# Patient Record
Sex: FEMALE | Race: WHITE | Employment: OTHER | URBAN - METROPOLITAN AREA
[De-identification: names, ages, dates, MRNs, and addresses within clinical notes are randomized per-mention and may not be internally consistent; named-entity substitution may affect disease eponyms.]

---

## 2017-01-04 ENCOUNTER — TELEPHONE (OUTPATIENT)
Dept: NEUROLOGY | Facility: CLINIC | Age: 82
End: 2017-01-04
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
## 2017-01-05 NOTE — TELEPHONE ENCOUNTER
Spoke with [employer] @ [facility], insurance issue was resolved. No further information is needed.

## 2017-01-12 ENCOUNTER — APPOINTMENT (OUTPATIENT)
Dept: PHYSICAL THERAPY | Facility: HOSPITAL | Age: 82
End: 2017-01-12
Attending: PHYSICAL MEDICINE & REHABILITATION
Payer: MEDICARE

## 2017-01-13 ENCOUNTER — TELEPHONE (OUTPATIENT)
Dept: NEUROLOGY | Facility: CLINIC | Age: 82
End: 2017-01-13

## 2017-01-13 NOTE — TELEPHONE ENCOUNTER
Spoke to patient who wanted to notify Dr. Shauna Dixon that she is very pleased with [facility]. Patient denies complaints at this time and feels she is making wonderful progress. Dr. Shauna Dixon notified. No further action required at this time.

## 2017-01-19 ENCOUNTER — APPOINTMENT (OUTPATIENT)
Dept: PHYSICAL THERAPY | Facility: HOSPITAL | Age: 82
End: 2017-01-19
Attending: PHYSICAL MEDICINE & REHABILITATION
Payer: MEDICARE

## 2017-02-08 ENCOUNTER — MED REC SCAN ONLY (OUTPATIENT)
Dept: NEUROLOGY | Facility: CLINIC | Age: 82
End: 2017-02-08

## 2017-02-16 ENCOUNTER — OFFICE VISIT (OUTPATIENT)
Dept: NEUROLOGY | Facility: CLINIC | Age: 82
End: 2017-02-16

## 2017-02-16 VITALS
DIASTOLIC BLOOD PRESSURE: 78 MMHG | RESPIRATION RATE: 16 BRPM | HEART RATE: 83 BPM | SYSTOLIC BLOOD PRESSURE: 128 MMHG | BODY MASS INDEX: 18 KG/M2 | WEIGHT: 104 LBS

## 2017-02-16 DIAGNOSIS — M43.17 SPONDYLOLISTHESIS AT L5-S1 LEVEL: ICD-10-CM

## 2017-02-16 DIAGNOSIS — M51.26 HNP (HERNIATED NUCLEUS PULPOSUS), LUMBAR: ICD-10-CM

## 2017-02-16 DIAGNOSIS — M96.1 POSTLAMINECTOMY SYNDROME, LUMBAR REGION: ICD-10-CM

## 2017-02-16 DIAGNOSIS — M43.16 SPONDYLOLISTHESIS AT L4-L5 LEVEL: ICD-10-CM

## 2017-02-16 DIAGNOSIS — M43.16 SPONDYLOLISTHESIS AT L3-L4 LEVEL: ICD-10-CM

## 2017-02-16 DIAGNOSIS — M48.061 SPINAL STENOSIS, LUMBAR REGION, WITHOUT NEUROGENIC CLAUDICATION: ICD-10-CM

## 2017-02-16 DIAGNOSIS — M51.37 DEGENERATION OF LUMBAR OR LUMBOSACRAL INTERVERTEBRAL DISC: Primary | ICD-10-CM

## 2017-02-16 PROCEDURE — 99214 OFFICE O/P EST MOD 30 MIN: CPT | Performed by: PHYSICAL MEDICINE & REHABILITATION

## 2017-02-16 NOTE — PATIENT INSTRUCTIONS
As of October 6th 2014, the Drug Enforcement Agency [facility]) is reclassifying all hydrocodone combination medications from Schedule III to Schedule II. This includes medications such as Norco, Vicodin, Lortab, Zohydro, and Vicoprofen.     What this means for y

## 2017-02-16 NOTE — PROGRESS NOTES
Low Back Pain H & P    Chief Complaint: Patient presents with:  Pain: pt here for follow up with c/o bilateral waist pain. patient is unable to bend/stand up straight/lay down due to pain.  patient is currently in Physical Therapy but to soon to determine r Location: [facility]    PATIENT DOCUMENTED NOT TO HAVE EXPERIENCED ANY OF THE FOLLOWING EVENTS N/A 1/6/2015    Comment Procedure: LUMBAR EPIDURAL;  Surgeon: Jose Espinoza MD;  Location: [address] [place] [address] W/ Problem Relation Age of Onset   • Heart Disorder Father      MI   • Diabetes Father    • Diabetes Maternal Grandmother        Social History     Social History   Marital Status:    Spouse Name: N/A    Years of Education: N/A  Number of Children: 1 Non-tender for bilateral Greater Trochanteric Bursa     Vascular lower extremity:   Dorsalis pedis pulse-RIGHT 2+   Dorsalis pedis pulse-LEFT 2+   Tibialis posterior pulse-RIGHT 2+   Tibialis posterior pulse-LEFT 2+     Neurological Lower Extremity:    Lig

## 2017-03-21 ENCOUNTER — MED REC SCAN ONLY (OUTPATIENT)
Dept: NEUROLOGY | Facility: CLINIC | Age: 82
End: 2017-03-21

## 2017-05-18 ENCOUNTER — OFFICE VISIT (OUTPATIENT)
Dept: NEUROLOGY | Facility: CLINIC | Age: 82
End: 2017-05-18

## 2017-05-18 VITALS
RESPIRATION RATE: 13 BRPM | SYSTOLIC BLOOD PRESSURE: 130 MMHG | HEIGHT: 64 IN | WEIGHT: 104 LBS | DIASTOLIC BLOOD PRESSURE: 62 MMHG | BODY MASS INDEX: 17.75 KG/M2 | HEART RATE: 84 BPM | OXYGEN SATURATION: 97 %

## 2017-05-18 DIAGNOSIS — M43.16 SPONDYLOLISTHESIS AT L4-L5 LEVEL: ICD-10-CM

## 2017-05-18 DIAGNOSIS — M43.16 SPONDYLOLISTHESIS AT L3-L4 LEVEL: ICD-10-CM

## 2017-05-18 DIAGNOSIS — M48.061 LUMBAR FORAMINAL STENOSIS: ICD-10-CM

## 2017-05-18 DIAGNOSIS — M48.061 SPINAL STENOSIS, LUMBAR REGION, WITHOUT NEUROGENIC CLAUDICATION: ICD-10-CM

## 2017-05-18 DIAGNOSIS — M54.16 LUMBAR RADICULOPATHY: ICD-10-CM

## 2017-05-18 DIAGNOSIS — M51.37 DEGENERATION OF LUMBAR OR LUMBOSACRAL INTERVERTEBRAL DISC: Primary | ICD-10-CM

## 2017-05-18 DIAGNOSIS — M51.26 HNP (HERNIATED NUCLEUS PULPOSUS), LUMBAR: ICD-10-CM

## 2017-05-18 DIAGNOSIS — M96.1 POSTLAMINECTOMY SYNDROME, LUMBAR REGION: ICD-10-CM

## 2017-05-18 DIAGNOSIS — M43.17 SPONDYLOLISTHESIS AT L5-S1 LEVEL: ICD-10-CM

## 2017-05-18 PROCEDURE — 99214 OFFICE O/P EST MOD 30 MIN: CPT | Performed by: PHYSICAL MEDICINE & REHABILITATION

## 2017-05-18 RX ORDER — ACETAMINOPHEN 500 MG
500 TABLET ORAL 2 TIMES DAILY PRN
COMMUNITY

## 2017-05-18 NOTE — PATIENT INSTRUCTIONS
As of October 6th 2014, the Drug Enforcement Agency [facility]) is reclassifying all hydrocodone combination medications from Schedule III to Schedule II. This includes medications such as Norco, Vicodin, Lortab, Zohydro, and Vicoprofen.     What this means for y chart.      Plan  The patient will continue with her home exercise program.    She will continue with the Tylenol for the pain as needed. The patient does not need any injections at this time. She is not interested in seeing a spine surgeon.     She w

## 2017-05-18 NOTE — PROGRESS NOTES
Low Back Pain H & P    Chief Complaint: Patient presents with:  Pain: pt here for follow up with continued bilateral waist/lower back pain radiating upward when bad that is worse on the right side. patient is unable to lay down due to the pain.  completed p THE FOLLOWING EVENTS N/A 1/6/2015    Comment Procedure: LUMBAR EPIDURAL;  Surgeon: Manjula Chinchilla MD;  Location: [facility]    INJECTION, W/WO CONTRAST, DX/THERAPEUTIC SUBSTANCE, EPIDURAL/SUBARACHNOID; LUMBAR/SACRAL N/A 1/19/2015    Co Diabetes Maternal Grandmother        Social History     Social History   Marital Status:    Spouse Name: N/A    Years of Education: N/A  Number of Children: 1     Occupational History       dance teacher-semi retired       Social History RIGHT:   4-/5  Hamstring LEFT:   4-/5   RIGHT plantar reflexes: downward response   LEFT plantar reflexes: downward response   Reflexes: 2+ in bilateral lower extremities     Assessment  1.  L5-S1 mod diffuse & right large far lateral, L4-5 mod diffuse, L3-

## 2017-11-16 ENCOUNTER — OFFICE VISIT (OUTPATIENT)
Dept: NEUROLOGY | Facility: CLINIC | Age: 82
End: 2017-11-16

## 2017-11-16 VITALS
BODY MASS INDEX: 18.78 KG/M2 | DIASTOLIC BLOOD PRESSURE: 66 MMHG | HEART RATE: 72 BPM | RESPIRATION RATE: 16 BRPM | HEIGHT: 64 IN | WEIGHT: 110 LBS | SYSTOLIC BLOOD PRESSURE: 140 MMHG

## 2017-11-16 DIAGNOSIS — M51.37 DEGENERATION OF LUMBAR OR LUMBOSACRAL INTERVERTEBRAL DISC: Primary | ICD-10-CM

## 2017-11-16 DIAGNOSIS — M51.26 HNP (HERNIATED NUCLEUS PULPOSUS), LUMBAR: ICD-10-CM

## 2017-11-16 DIAGNOSIS — M96.1 POSTLAMINECTOMY SYNDROME, LUMBAR REGION: ICD-10-CM

## 2017-11-16 DIAGNOSIS — M48.061 SPINAL STENOSIS, LUMBAR REGION, WITHOUT NEUROGENIC CLAUDICATION: ICD-10-CM

## 2017-11-16 DIAGNOSIS — S80.812A ABRASION OF LEFT LOWER LEG WITH INFECTION, INITIAL ENCOUNTER: ICD-10-CM

## 2017-11-16 DIAGNOSIS — M43.17 SPONDYLOLISTHESIS AT L5-S1 LEVEL: ICD-10-CM

## 2017-11-16 DIAGNOSIS — L08.9 ABRASION OF LEFT LOWER LEG WITH INFECTION, INITIAL ENCOUNTER: ICD-10-CM

## 2017-11-16 DIAGNOSIS — M43.16 SPONDYLOLISTHESIS AT L3-L4 LEVEL: ICD-10-CM

## 2017-11-16 DIAGNOSIS — M54.16 LUMBAR RADICULOPATHY: ICD-10-CM

## 2017-11-16 DIAGNOSIS — M48.061 LUMBAR FORAMINAL STENOSIS: ICD-10-CM

## 2017-11-16 DIAGNOSIS — M43.16 SPONDYLOLISTHESIS AT L4-L5 LEVEL: ICD-10-CM

## 2017-11-16 PROCEDURE — 99214 OFFICE O/P EST MOD 30 MIN: CPT | Performed by: PHYSICAL MEDICINE & REHABILITATION

## 2017-11-16 NOTE — PROGRESS NOTES
Low Back Pain H & P    Chief Complaint: Patient presents with:  Back Pain: LOV:5/18/17 Patient is following up on back pain not getting better. Patient rated her pain a 5/10      Patient was last seen on 5/18/17.   She is going to dance classes 2 times a we Dhaval Pratt MD;  Location: [address] [place] [address] [address]                MANAGEMENT  1/28/2015: INJECTION, W/WO CONTRAST, DX/THERAPEUTIC SUBST* N/A      Comment: Procedure: LUMBAR EPIDURAL;  Surgeon: Zigmund Bloch, MD;  Location: [address] [place] [address] History Main Topics   Smoking status: Never Smoker    Smokeless tobacco: Never Used    Alcohol use No    Drug use: No    Sexual activity: No    Comment:      Other Topics Concern     Service No    Blood Transfusions No    Caffeine Concern Natalya Murray lateral, L4-5 mod diffuse, L3-4 right mod, L2-3 mod diffuse bugling discs    2. L4-5 mod central, L3-4 mod-severe central, L2-3 mod central stenosis    3. L4-5 right large HNP    4. Spondylolisthesis at L4-L5 level grade 1-2 unstable    5.  Spondylolisthesi

## 2017-11-16 NOTE — PROGRESS NOTES
Pt presented with left leg abrasion above ankle, skin tear 2.5 x 3.0 cm. Near ankle. Pt leg is swollen and redden around abrasion to ankle to foot. [facility] called arrangement made for pt to see PCP associate today at 145pm. Report to Violetta Leonard triage.  Wound was

## 2017-11-22 PROCEDURE — 87070 CULTURE OTHR SPECIMN AEROBIC: CPT | Performed by: NURSE PRACTITIONER

## 2017-11-22 PROCEDURE — 87205 SMEAR GRAM STAIN: CPT | Performed by: NURSE PRACTITIONER

## 2017-11-29 PROBLEM — S81.802A TRAUMATIC OPEN WOUND OF LEFT LOWER LEG, INITIAL ENCOUNTER: Status: ACTIVE | Noted: 2017-11-29

## 2017-12-01 PROBLEM — S81.802D TRAUMATIC OPEN WOUND OF LEFT LOWER LEG, SUBSEQUENT ENCOUNTER: Status: ACTIVE | Noted: 2017-11-29

## 2018-01-19 PROBLEM — K41.90 FEMORAL HERNIA OF LEFT SIDE: Status: ACTIVE | Noted: 2018-01-19

## 2018-03-01 PROBLEM — H35.363 DRUSEN (DEGENERATIVE) OF MACULA, BILATERAL: Status: ACTIVE | Noted: 2018-03-01

## 2018-03-01 PROBLEM — F40.240 CLAUSTROPHOBIA: Status: ACTIVE | Noted: 2018-03-01

## 2018-06-12 PROBLEM — I35.0 AORTIC STENOSIS, MODERATE: Status: ACTIVE | Noted: 2018-06-12

## 2018-07-18 PROBLEM — M54.50 LOW BACK PAIN AT MULTIPLE SITES: Status: ACTIVE | Noted: 2018-07-18

## 2018-08-23 PROBLEM — S80.812A ABRASION OF LEFT LOWER LEG WITH INFECTION: Status: RESOLVED | Noted: 2017-11-16 | Resolved: 2018-08-23

## 2018-08-23 PROBLEM — S81.802D TRAUMATIC OPEN WOUND OF LEFT LOWER LEG, SUBSEQUENT ENCOUNTER: Status: RESOLVED | Noted: 2017-11-29 | Resolved: 2018-08-23

## 2018-08-23 PROBLEM — L08.9 ABRASION OF LEFT LOWER LEG WITH INFECTION: Status: RESOLVED | Noted: 2017-11-16 | Resolved: 2018-08-23

## 2018-12-05 ENCOUNTER — HOSPITAL ENCOUNTER (EMERGENCY)
Facility: HOSPITAL | Age: 83
Discharge: HOME | End: 2018-12-05
Payer: MEDICARE

## 2018-12-05 VITALS
SYSTOLIC BLOOD PRESSURE: 130 MMHG | DIASTOLIC BLOOD PRESSURE: 63 MMHG | BODY MASS INDEX: 18.78 KG/M2 | HEART RATE: 88 BPM | WEIGHT: 110 LBS | TEMPERATURE: 97 F | RESPIRATION RATE: 20 BRPM | OXYGEN SATURATION: 98 % | HEIGHT: 64 IN

## 2018-12-05 NOTE — ED INITIAL ASSESSMENT (HPI)
Pt is c/o back pain that started this morning, pt was seen at Dr. Joy Manzo this morning and she was given toradol 15mg IM, and depo-medrol 80 mg IM for pain. A few hrs later her pain came back and not she is here.  Pt took tylenol and a muscle relaxer at 1300

## 2019-07-05 ENCOUNTER — HOSPITAL ENCOUNTER (OUTPATIENT)
Age: 84
Discharge: HOME | End: 2019-07-05
Attending: EMERGENCY MEDICINE
Payer: MEDICARE

## 2019-07-05 VITALS
DIASTOLIC BLOOD PRESSURE: 64 MMHG | HEART RATE: 75 BPM | WEIGHT: 110 LBS | BODY MASS INDEX: 18.78 KG/M2 | SYSTOLIC BLOOD PRESSURE: 151 MMHG | HEIGHT: 64 IN | TEMPERATURE: 97 F | OXYGEN SATURATION: 100 % | RESPIRATION RATE: 24 BRPM

## 2019-07-05 DIAGNOSIS — S81.812A NONINFECTED SKIN TEAR OF LEFT LEG, INITIAL ENCOUNTER: Primary | ICD-10-CM

## 2019-07-05 PROCEDURE — 99202 OFFICE O/P NEW SF 15 MIN: CPT

## 2019-07-05 PROCEDURE — 99212 OFFICE O/P EST SF 10 MIN: CPT

## 2019-07-05 NOTE — ED PROVIDER NOTES
Patient Seen in: [facility]    History   Patient presents with:  Laceration Abrasion (integumentary)    Stated Complaint: TL-Laceration    HPI    Patient presents to the urgent care today with complaint of laceration note total) by mouth daily. acetaminophen 500 MG Oral Tab,  Take 500 mg by mouth 2 (two) times daily as needed for Pain. Multiple Vitamin (ONE-DAILY MULTI VITAMINS) Oral Tab,  Take 1 tablet by mouth daily.    tiZANidine HCl 4 MG Oral Cap,  Take 1 capsule (4 care at home as well as reasons to return she is comfortable with plan.     ED Course   Labs Reviewed - No data to display     MDM     100% Normal  Pulse oximetry       Disposition and Plan     We recommend that you schedule follow up care with a primary ca
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
## 2019-08-29 ENCOUNTER — APPOINTMENT (OUTPATIENT)
Dept: ULTRASOUND IMAGING | Facility: HOSPITAL | Age: 84
End: 2019-08-29
Attending: EMERGENCY MEDICINE
Payer: MEDICARE

## 2019-08-29 ENCOUNTER — HOSPITAL ENCOUNTER (OUTPATIENT)
Age: 84
Discharge: HOME | End: 2019-08-29
Attending: EMERGENCY MEDICINE
Payer: MEDICARE

## 2019-08-29 VITALS
HEIGHT: 64 IN | RESPIRATION RATE: 20 BRPM | WEIGHT: 110 LBS | OXYGEN SATURATION: 99 % | BODY MASS INDEX: 18.78 KG/M2 | HEART RATE: 80 BPM | DIASTOLIC BLOOD PRESSURE: 72 MMHG | SYSTOLIC BLOOD PRESSURE: 165 MMHG | TEMPERATURE: 98 F

## 2019-08-29 DIAGNOSIS — L03.119 CELLULITIS OF LOWER EXTREMITY, UNSPECIFIED LATERALITY: Primary | ICD-10-CM

## 2019-08-29 LAB
#MXD IC: 0.6 X10ˆ3/UL (ref 0.1–1)
CREAT BLD-MCNC: 0.5 MG/DL (ref 0.55–1.02)
GLUCOSE BLD-MCNC: 98 MG/DL (ref 70–99)
HCT VFR BLD AUTO: 44.3 % (ref 35–48)
HGB BLD-MCNC: 13.9 G/DL (ref 12–16)
ISTAT BUN: 21 MG/DL (ref 8–20)
ISTAT CHLORIDE: 100 MMOL/L (ref 101–111)
ISTAT HEMATOCRIT: 42 % (ref 34–50)
ISTAT IONIZED CALCIUM FOR CHEM 8: 1.25 MMOL/L (ref 1.12–1.32)
ISTAT POTASSIUM: 3.9 MMOL/L (ref 3.6–5.1)
ISTAT SODIUM: 137 MMOL/L (ref 136–145)
ISTAT TCO2: 28 MMOL/L (ref 22–32)
LYMPHOCYTES # BLD AUTO: 1.4 X10ˆ3/UL (ref 1–4)
LYMPHOCYTES NFR BLD AUTO: 24.3 %
MCH RBC QN AUTO: 28.6 PG (ref 26–34)
MCHC RBC AUTO-ENTMCNC: 31.4 G/DL (ref 31–37)
MCV RBC AUTO: 91.2 FL (ref 80–100)
MIXED CELL %: 10.8 %
NEUTROPHILS # BLD AUTO: 3.9 X10ˆ3/UL (ref 1.5–7.7)
NEUTROPHILS NFR BLD AUTO: 64.9 %
PLATELET # BLD AUTO: 212 X10ˆ3/UL (ref 150–450)
RBC # BLD AUTO: 4.86 X10ˆ6/UL (ref 3.8–5.3)
WBC # BLD AUTO: 5.9 X10ˆ3/UL (ref 4–11)

## 2019-08-29 PROCEDURE — 99214 OFFICE O/P EST MOD 30 MIN: CPT

## 2019-08-29 PROCEDURE — 85025 COMPLETE CBC W/AUTO DIFF WBC: CPT | Performed by: EMERGENCY MEDICINE

## 2019-08-29 PROCEDURE — 93971 EXTREMITY STUDY: CPT | Performed by: EMERGENCY MEDICINE

## 2019-08-29 PROCEDURE — 80047 BASIC METABLC PNL IONIZED CA: CPT

## 2019-08-29 RX ORDER — AZITHROMYCIN 250 MG/1
TABLET, FILM COATED ORAL
Qty: 1 PACKAGE | Refills: 0 | Status: SHIPPED | OUTPATIENT
Start: 2019-08-29 | End: 2019-09-03

## 2019-08-29 NOTE — ED PROVIDER NOTES
Patient Seen in: [facility]    History   Patient presents with:  Rash Skin Problem (integumentary)    Stated Complaint: r-leg red    HPI    This patient complains of redness and swelling to the right leg.   Patient stated systems reviewed and negative except as noted above.     Physical Exam     ED Triage Vitals [08/29/19 1139]   BP (!) 165/72   Pulse 80   Resp 20   Temp 97.5 °F (36.4 °C)   Temp src Oral   SpO2 99 %   O2 Device None (Room air)       Current:BP (!) 165/72   P on 8/29/2019 at 12:37     Approved by (CST): Tayla Adam MD on 8/29/2019 at 12:37          Labs Reviewed   POCT ISTAT CHEM8 CARTRIDGE - Abnormal; Notable for the following components:       Result Value    ISTAT BUN 21 (*)     ISTAT Chloride 100 (*)

## 2019-08-29 NOTE — ED INITIAL ASSESSMENT (HPI)
Patient with right shin swelling, redness and warmth x 1 week. States she struck her leg on the door of her  prior to the redness. Denies any drainage from the site.

## 2020-07-12 ENCOUNTER — HOSPITAL ENCOUNTER (OUTPATIENT)
Age: 85
Discharge: HOME | End: 2020-07-12
Attending: EMERGENCY MEDICINE
Payer: MEDICARE

## 2020-07-12 VITALS
DIASTOLIC BLOOD PRESSURE: 82 MMHG | SYSTOLIC BLOOD PRESSURE: 191 MMHG | RESPIRATION RATE: 20 BRPM | HEART RATE: 88 BPM | OXYGEN SATURATION: 99 % | TEMPERATURE: 98 F

## 2020-07-12 DIAGNOSIS — M62.830 BACK SPASM: Primary | ICD-10-CM

## 2020-07-12 PROCEDURE — 99214 OFFICE O/P EST MOD 30 MIN: CPT

## 2020-07-12 PROCEDURE — 96372 THER/PROPH/DIAG INJ SC/IM: CPT

## 2020-07-12 RX ORDER — KETOROLAC TROMETHAMINE 30 MG/ML
30 INJECTION, SOLUTION INTRAMUSCULAR; INTRAVENOUS ONCE
Status: COMPLETED | OUTPATIENT
Start: 2020-07-12 | End: 2020-07-12

## 2020-07-12 NOTE — ED PROVIDER NOTES
[facility] [address]      History   Patient presents with:  Back Pain    Stated Complaint: back spasms    HPI    22-year-old female with history of thoracic back spasms presents for evaluation of back pain.   Janee Current:BP (!) 191/82   Pulse 88   Temp 98.2 °F (36.8 °C) (Temporal)   Resp 20   SpO2 99%         Physical Exam  Vitals signs and nursing note reviewed. Constitutional:       General: She is not in acute distress. Appearance: Normal appearance. [id number]  [phone]    Schedule an appointment as soon as possible for a visit in 2 days  For follow up          Medications Prescribed:  Current Discharge Medication List

## 2020-09-15 ENCOUNTER — HOSPITAL ENCOUNTER (OUTPATIENT)
Age: 85
Discharge: HOME | End: 2020-09-15
Attending: EMERGENCY MEDICINE
Payer: MEDICARE

## 2020-09-15 VITALS
HEART RATE: 83 BPM | WEIGHT: 104 LBS | HEIGHT: 64 IN | RESPIRATION RATE: 20 BRPM | SYSTOLIC BLOOD PRESSURE: 164 MMHG | OXYGEN SATURATION: 99 % | DIASTOLIC BLOOD PRESSURE: 60 MMHG | BODY MASS INDEX: 17.75 KG/M2 | TEMPERATURE: 98 F

## 2020-09-15 DIAGNOSIS — M62.830 LUMBAR PARASPINAL MUSCLE SPASM: Primary | ICD-10-CM

## 2020-09-15 PROCEDURE — 96372 THER/PROPH/DIAG INJ SC/IM: CPT

## 2020-09-15 PROCEDURE — 99214 OFFICE O/P EST MOD 30 MIN: CPT

## 2020-09-15 RX ORDER — KETOROLAC TROMETHAMINE 30 MG/ML
15 INJECTION, SOLUTION INTRAMUSCULAR; INTRAVENOUS ONCE
Status: COMPLETED | OUTPATIENT
Start: 2020-09-15 | End: 2020-09-15

## 2020-09-15 NOTE — ED PROVIDER NOTES
Patient Seen in: [facility]      History   Patient presents with:  Back Pain    Stated Complaint: BACK SPASMS    HPI    80year old female with h/o hyperlipidemia, arthritis, multi-level spondylolisthesis who presents for O2 Device None (Room air)       Current:BP (!) 164/60   Pulse 83   Temp 98 °F (36.7 °C) (Temporal)   Resp 20   Ht 162.6 cm (5' 4\")   Wt 47.2 kg   SpO2 99%   BMI 17.85 kg/m²         Physical Exam  Vitals signs and nursing note reviewed.    Constitutional: paraspinal muscle spasm  (primary encounter diagnosis)    Disposition:  Discharge  9/15/2020  8:24 am    Follow-up:  Candy Patiño MD  [address] [place] [address] [phone]    Schedule an appointment as soon as possible for a

## 2020-12-05 ENCOUNTER — HOSPITAL ENCOUNTER (OUTPATIENT)
Age: 85
Discharge: HOME | End: 2020-12-05
Attending: EMERGENCY MEDICINE
Payer: MEDICARE

## 2020-12-05 VITALS
TEMPERATURE: 99 F | OXYGEN SATURATION: 100 % | DIASTOLIC BLOOD PRESSURE: 79 MMHG | HEART RATE: 91 BPM | SYSTOLIC BLOOD PRESSURE: 153 MMHG | RESPIRATION RATE: 18 BRPM

## 2020-12-05 DIAGNOSIS — S29.012A STRAIN OF THORACIC SPINE: Primary | ICD-10-CM

## 2020-12-05 PROCEDURE — 96372 THER/PROPH/DIAG INJ SC/IM: CPT

## 2020-12-05 PROCEDURE — 99214 OFFICE O/P EST MOD 30 MIN: CPT

## 2020-12-05 RX ORDER — KETOROLAC TROMETHAMINE 30 MG/ML
15 INJECTION, SOLUTION INTRAMUSCULAR; INTRAVENOUS ONCE
Status: COMPLETED | OUTPATIENT
Start: 2020-12-05 | End: 2020-12-05

## 2020-12-05 NOTE — ED PROVIDER NOTES
Patient Seen in: [facility]      History   Patient presents with:  Back Pain    Stated Complaint: back pain    HPI    The patient is an 44-year-old female with past history of claustrophobia, intermittent back pain who presents now with mild Smokeless tobacco: Never Used    Alcohol use: No      Alcohol/week: 0.0 standard drinks    Drug use: No             Review of Systems    Positive for stated complaint: back pain  Other systems are as noted in HPI. Constitutional and vital signs reviewed. [address]  [phone]      As needed          Medications Prescribed:  Discharge Medication List as of 12/5/2020 12:14 PM

## 2020-12-05 NOTE — ED INITIAL ASSESSMENT (HPI)
PATIENT ARRIVED AMBULATORY TO ROOM C/O MID BACK PAIN. PATIENT STATES SHE HAS A H/O BACK SPASMS.  PATIENT STATES \"I USUALLY GET A SHOT OF TORADOL AND THAT WORKS FOR ME\" NO NUMBNESS/TINGLING TO EXTREMITIES

## 2020-12-10 ENCOUNTER — HOSPITAL ENCOUNTER (OUTPATIENT)
Age: 85
Discharge: HOME | End: 2020-12-10
Attending: EMERGENCY MEDICINE
Payer: MEDICARE

## 2020-12-10 VITALS
SYSTOLIC BLOOD PRESSURE: 137 MMHG | OXYGEN SATURATION: 100 % | HEART RATE: 82 BPM | DIASTOLIC BLOOD PRESSURE: 73 MMHG | TEMPERATURE: 98 F | BODY MASS INDEX: 19 KG/M2 | WEIGHT: 110 LBS | RESPIRATION RATE: 18 BRPM

## 2020-12-10 DIAGNOSIS — S29.012A STRAIN OF THORACIC SPINE: Primary | ICD-10-CM

## 2020-12-10 PROCEDURE — 99214 OFFICE O/P EST MOD 30 MIN: CPT

## 2020-12-10 PROCEDURE — 96372 THER/PROPH/DIAG INJ SC/IM: CPT

## 2020-12-10 PROCEDURE — 81002 URINALYSIS NONAUTO W/O SCOPE: CPT

## 2020-12-10 RX ORDER — KETOROLAC TROMETHAMINE 30 MG/ML
15 INJECTION, SOLUTION INTRAMUSCULAR; INTRAVENOUS ONCE
Status: COMPLETED | OUTPATIENT
Start: 2020-12-10 | End: 2020-12-10

## 2020-12-10 NOTE — ED INITIAL ASSESSMENT (HPI)
Presents ambulatory to room. Multiple IC visits for chronic back pain requesting Toradol injections to control pain. Seen here 12/5/2020 for same symptoms. States pain became worse over last 2 days. Requesting more pain meds.

## 2020-12-10 NOTE — ED PROVIDER NOTES
Patient Seen in: [facility]      History   Patient presents with:  Back Pain    Stated Complaint: back pain    HPI    The patient is a 80-year-old female with a past history of osteoarthritis of the back, chronic intermittent back pain who pr noted above.     Physical Exam     ED Triage Vitals [12/10/20 1109]   /73   Pulse 82   Resp 18   Temp 97.5 °F (36.4 °C)   Temp src Temporal   SpO2 100 %   O2 Device None (Room air)       Current:/73   Pulse 82   Temp 97.5 °F (36.4 °C) (Temporal) am    Follow-up:  Owen Sanford MD  [address] [address]  [phone]      Call for an appointment          Medications Prescribed:  Discharge Medication List as of 12/10/2020 11:37 AM

## 2020-12-11 ENCOUNTER — HOSPITAL ENCOUNTER (OUTPATIENT)
Age: 85
Discharge: HOME | End: 2020-12-11
Attending: EMERGENCY MEDICINE
Payer: MEDICARE

## 2020-12-11 VITALS
DIASTOLIC BLOOD PRESSURE: 69 MMHG | TEMPERATURE: 99 F | HEIGHT: 64 IN | SYSTOLIC BLOOD PRESSURE: 165 MMHG | RESPIRATION RATE: 18 BRPM | WEIGHT: 110 LBS | HEART RATE: 83 BPM | OXYGEN SATURATION: 100 % | BODY MASS INDEX: 18.78 KG/M2

## 2020-12-11 DIAGNOSIS — S29.012A STRAIN OF THORACIC BACK REGION: Primary | ICD-10-CM

## 2020-12-11 PROCEDURE — 99214 OFFICE O/P EST MOD 30 MIN: CPT

## 2020-12-11 PROCEDURE — 96372 THER/PROPH/DIAG INJ SC/IM: CPT

## 2020-12-11 RX ORDER — KETOROLAC TROMETHAMINE 30 MG/ML
30 INJECTION, SOLUTION INTRAMUSCULAR; INTRAVENOUS ONCE
Status: COMPLETED | OUTPATIENT
Start: 2020-12-11 | End: 2020-12-11

## 2020-12-11 NOTE — ED INITIAL ASSESSMENT (HPI)
PATIENT C/O PAIN TO LEFT SCAPULA AREA. DENIES TRAUMA/INJURY. WAS SEEN IN THE IC ON 12/6 AND 12/10, GIVEN TORADOL INJECTIONS DURING THESE VISITS, STATES PAIN IMPROVES WITH THE TORADOL BUT THEN RETURNS. TAKING TYLENOL AND A MUSCLE RELAXER AT HOME.

## 2020-12-12 NOTE — ED PROVIDER NOTES
Patient Seen in: [facility]      History   Patient presents with:  Back Pain    Stated Complaint: Left back pain    HPI    The patient is a 80-year-old female who presents with left upper back pain just medial to her scapula for the last few Current:BP (!) 165/69   Pulse 83   Temp 99.4 °F (37.4 °C) (Temporal)   Resp 18   Ht 162.6 cm (5' 4\")   Wt 49.9 kg   SpO2 100%   BMI 18.88 kg/m²         Physical Exam  Vitals signs and nursing note reviewed.    Constitutional:       General: She is not in a Patient with reproducible pain that has responded to Toradol. Patient understands that she should not get any more Toradol injections after today and should follow-up with her physiatrist and make appoint for physical therapy.   Patient to return if sympto

## 2020-12-23 ENCOUNTER — HOSPITAL ENCOUNTER (OUTPATIENT)
Age: 85
Discharge: HOME | End: 2020-12-23
Attending: EMERGENCY MEDICINE
Payer: MEDICARE

## 2020-12-23 VITALS
TEMPERATURE: 99 F | OXYGEN SATURATION: 98 % | DIASTOLIC BLOOD PRESSURE: 98 MMHG | HEIGHT: 64 IN | BODY MASS INDEX: 18.78 KG/M2 | WEIGHT: 110 LBS | RESPIRATION RATE: 20 BRPM | SYSTOLIC BLOOD PRESSURE: 135 MMHG | HEART RATE: 94 BPM

## 2020-12-23 DIAGNOSIS — M54.6 ACUTE RIGHT-SIDED THORACIC BACK PAIN: Primary | ICD-10-CM

## 2020-12-23 PROCEDURE — 99214 OFFICE O/P EST MOD 30 MIN: CPT

## 2020-12-23 PROCEDURE — 96372 THER/PROPH/DIAG INJ SC/IM: CPT

## 2020-12-23 RX ORDER — KETOROLAC TROMETHAMINE 30 MG/ML
15 INJECTION, SOLUTION INTRAMUSCULAR; INTRAVENOUS ONCE
Status: COMPLETED | OUTPATIENT
Start: 2020-12-23 | End: 2020-12-23

## 2020-12-23 NOTE — ED INITIAL ASSESSMENT (HPI)
PATIENT REPORTS LOWER BACK PIAN. PAIN IS CHRONIC IN NAUTRE. RECENTLY STARTED PT.  NO NEW TRAUMA/INJURY.

## 2020-12-24 NOTE — ED PROVIDER NOTES
Patient Seen in: [facility]      History   Patient presents with:  Back Pain    Stated Complaint: lower back spasmx    HPI    79 yo female with chronic back pain presents with pain to the right upper thoracic back. No new trauma.  No recent il Conjunctiva/sclera: Conjunctivae normal.      Pupils: Pupils are equal, round, and reactive to light. Neck:      Musculoskeletal: Normal range of motion and neck supple. Cardiovascular:      Rate and Rhythm: Normal rate and regular rhythm.    Pulmonary:

## 2021-04-16 ENCOUNTER — HOSPITAL ENCOUNTER (OUTPATIENT)
Age: 86
Discharge: HOME | End: 2021-04-16
Attending: EMERGENCY MEDICINE
Payer: MEDICARE

## 2021-04-16 VITALS
DIASTOLIC BLOOD PRESSURE: 69 MMHG | SYSTOLIC BLOOD PRESSURE: 157 MMHG | RESPIRATION RATE: 24 BRPM | TEMPERATURE: 99 F | HEART RATE: 89 BPM | OXYGEN SATURATION: 97 %

## 2021-04-16 DIAGNOSIS — M54.9 BACK PAIN WITHOUT RADIATION: Primary | ICD-10-CM

## 2021-04-16 PROCEDURE — 99213 OFFICE O/P EST LOW 20 MIN: CPT

## 2021-04-16 PROCEDURE — 99214 OFFICE O/P EST MOD 30 MIN: CPT

## 2021-04-16 PROCEDURE — 96372 THER/PROPH/DIAG INJ SC/IM: CPT

## 2021-04-16 RX ORDER — KETOROLAC TROMETHAMINE 30 MG/ML
15 INJECTION, SOLUTION INTRAMUSCULAR; INTRAVENOUS ONCE
Status: COMPLETED | OUTPATIENT
Start: 2021-04-16 | End: 2021-04-16

## 2021-04-16 NOTE — ED INITIAL ASSESSMENT (HPI)
Patient c/o left sided back spasms starting yesterday.   Patient states similar pain has been relieved by toradol injections in the past.

## 2021-04-18 ENCOUNTER — HOSPITAL ENCOUNTER (OUTPATIENT)
Age: 86
Discharge: HOME | End: 2021-04-18
Attending: EMERGENCY MEDICINE
Payer: MEDICARE

## 2021-04-18 VITALS
DIASTOLIC BLOOD PRESSURE: 83 MMHG | RESPIRATION RATE: 24 BRPM | OXYGEN SATURATION: 97 % | TEMPERATURE: 98 F | HEART RATE: 110 BPM | SYSTOLIC BLOOD PRESSURE: 160 MMHG

## 2021-04-18 DIAGNOSIS — M54.9 SEVERE BACK PAIN: Primary | ICD-10-CM

## 2021-04-18 PROCEDURE — 99213 OFFICE O/P EST LOW 20 MIN: CPT

## 2021-04-18 PROCEDURE — 99212 OFFICE O/P EST SF 10 MIN: CPT

## 2021-04-18 NOTE — ED PROVIDER NOTES
Patient Seen in: [facility]      History   Patient presents with:  Back Pain    Stated Complaint: back pain    HPI/Subjective:   HPI    81 yo female with h/o chronic back pain which she describes as spasm.  Pain is usually right thoracolumbar Procedure: LUMBAR EPIDURAL;  Surgeon: Joel Belle MD;  Location: [address] [place] [address]   • PATIENT DOCUMENTED NOT TO HAVE EXPERIENCED ANY OF THE FOLLOWING EVENTS N/A 1/6/2015    Procedure: LUMBAR EPIDURAL;  Surgeon: Joel Belle MD;  Lesli Middleton (Room air)       Current:/83   Pulse 110   Temp 98.4 °F (36.9 °C) (Temporal)   Resp 24   SpO2 97%         Physical Exam  Vitals and nursing note reviewed. Constitutional:       General: She is in acute distress.       Appearance: She is well-develop [facility] [address] [place] [address]  [phone]    In 1 day            Medications Prescribed:  Current Discharge Medication List

## 2021-04-18 NOTE — ED PROVIDER NOTES
Patient Seen in: [facility]      History   Patient presents with:  Back Pain    Stated Complaint: back spasms    HPI/Subjective:   HPI    81 yo female with chronic back pain.  Has had epidural injection in the past. Pain is left sided and desc EVENTS N/A 1/6/2015    Procedure: LUMBAR EPIDURAL;  Surgeon: Mamie Bianchi MD;  Location: [address] [place] [address]   • PATIENT DOCUMENTED NOT TO HAVE EXPERIENCED ANY OF THE FOLLOWING EVENTS N/A 1/19/2015    Procedure: LUMBAR EPIDURAL;  Surgeon: Mana Sampson Constitutional:       Appearance: Normal appearance. She is well-developed. HENT:      Head: Normocephalic and atraumatic. Eyes:      Conjunctiva/sclera: Conjunctivae normal.      Pupils: Pupils are equal, round, and reactive to light.    Jessee Devoid

## 2021-05-26 PROCEDURE — 99284 EMERGENCY DEPT VISIT MOD MDM: CPT

## 2021-05-27 ENCOUNTER — HOSPITAL ENCOUNTER (EMERGENCY)
Facility: HOSPITAL | Age: 86
Discharge: HOME | End: 2021-05-27
Attending: EMERGENCY MEDICINE
Payer: MEDICARE

## 2021-05-27 ENCOUNTER — APPOINTMENT (OUTPATIENT)
Dept: CT IMAGING | Facility: HOSPITAL | Age: 86
End: 2021-05-27
Attending: EMERGENCY MEDICINE
Payer: MEDICARE

## 2021-05-27 VITALS
HEART RATE: 77 BPM | OXYGEN SATURATION: 99 % | HEIGHT: 64 IN | RESPIRATION RATE: 18 BRPM | TEMPERATURE: 99 F | WEIGHT: 110 LBS | SYSTOLIC BLOOD PRESSURE: 179 MMHG | BODY MASS INDEX: 18.78 KG/M2 | DIASTOLIC BLOOD PRESSURE: 79 MMHG

## 2021-05-27 DIAGNOSIS — S09.90XA INJURY OF HEAD, INITIAL ENCOUNTER: Primary | ICD-10-CM

## 2021-05-27 PROCEDURE — 70450 CT HEAD/BRAIN W/O DYE: CPT | Performed by: EMERGENCY MEDICINE

## 2021-05-27 NOTE — ED INITIAL ASSESSMENT (HPI)
Fall backwards out of dinner chair at appx 2100, pt denies ANY complaints at this time. No head injury.

## 2021-05-27 NOTE — ED PROVIDER NOTES
Patient Seen in: [facility]    History   Patient presents with:  Fall      HPI    The patient presents to the ED after injuring her head due to falling over in her chair after dinner.   She states that she sat down and somehow SUBSTANCE, EPIDURAL/SUBARACHNOID; LUMBAR/SACRAL N/A 1/28/2015    Procedure: LUMBAR EPIDURAL;  Surgeon: Leti Fernandez MD;  Location: [address] [place] [address]   • PATIENT DOCUMENTED NOT TO HAVE EXPERIENCED ANY OF THE FOLLOWING EVENTS N/A 1/6/2015 Smoking status: Never Smoker      Smokeless tobacco: Never Used    Vaping Use      Vaping Use: Never used    Substance and Sexual Activity      Alcohol use: No        Alcohol/week: 0.0 standard drinks      Drug use: No      Sexual activity: Never        Co No discharge. Conjunctiva/sclera: Conjunctivae normal.   Neck:      Trachea: No tracheal deviation. Cardiovascular:      Rate and Rhythm: Normal rate. Pulmonary:      Effort: Pulmonary effort is normal. No respiratory distress.       Breath sounds: to contribute to the complexity of this ED evaluation. ED Course: Patient presents to the ED after falling over backwards in a chair and hitting her head. CT negative for intracranial injury. Patient reassured and stable for discharge home.     Yomaira

## 2021-06-29 PROBLEM — S33.6XXD: Status: ACTIVE | Noted: 2021-06-29

## 2021-11-02 ENCOUNTER — HOSPITAL ENCOUNTER (OUTPATIENT)
Age: 86
Discharge: HOME | End: 2021-11-02
Attending: EMERGENCY MEDICINE
Payer: MEDICARE

## 2021-11-02 VITALS
OXYGEN SATURATION: 98 % | TEMPERATURE: 98 F | RESPIRATION RATE: 18 BRPM | SYSTOLIC BLOOD PRESSURE: 161 MMHG | HEART RATE: 84 BPM | DIASTOLIC BLOOD PRESSURE: 65 MMHG

## 2021-11-02 DIAGNOSIS — M54.50 ACUTE EXACERBATION OF CHRONIC LOW BACK PAIN: Primary | ICD-10-CM

## 2021-11-02 DIAGNOSIS — G89.29 ACUTE EXACERBATION OF CHRONIC LOW BACK PAIN: Primary | ICD-10-CM

## 2021-11-02 PROCEDURE — 99213 OFFICE O/P EST LOW 20 MIN: CPT

## 2021-11-02 PROCEDURE — 96372 THER/PROPH/DIAG INJ SC/IM: CPT

## 2021-11-02 PROCEDURE — 99214 OFFICE O/P EST MOD 30 MIN: CPT

## 2021-11-02 RX ORDER — KETOROLAC TROMETHAMINE 30 MG/ML
15 INJECTION, SOLUTION INTRAMUSCULAR; INTRAVENOUS ONCE
Status: COMPLETED | OUTPATIENT
Start: 2021-11-02 | End: 2021-11-02

## 2021-11-02 RX ORDER — KETOROLAC TROMETHAMINE 30 MG/ML
15 INJECTION, SOLUTION INTRAMUSCULAR; INTRAVENOUS ONCE
Status: DISCONTINUED | OUTPATIENT
Start: 2021-11-02 | End: 2021-11-02

## 2021-11-03 NOTE — ED PROVIDER NOTES
Patient Seen in: [facility]      History   Patient presents with:  Back Pain    Stated Complaint: back spasms    Subjective:   HPI    The patient is a 75-year-old female with a history of osteoarthritis and chronic low back pain who presents INJECTION, W/WO CONTRAST, DX/THERAPEUTIC SUBSTANCE, EPIDURAL/SUBARACHNOID; LUMBAR/SACRAL N/A 1/28/2015    Procedure: LUMBAR EPIDURAL;  Surgeon: Cherylene Deters, MD;  Location: [facility]   • PATIENT DOCUMENTED NOT TO HAVE EXPERIENCED ANY (!) 161/65   Pulse 84   Resp 18   Temp 98.3 °F (36.8 °C)   Temp src Temporal   SpO2 98 %   O2 Device None (Room air)       Current:BP (!) 161/65   Pulse 84   Temp 98.3 °F (36.8 °C) (Temporal)   Resp 18   SpO2 98%         Physical Exam  Vitals and nursing n normal.     Differential diagnosis includes sacroiliitis, muscle spasm, osteoarthritis        ED Course   Labs Reviewed - No data to display       Patient understands that she needs to follow-up with her doctor apply heat and ice, IcyHot patches and return

## 2021-12-10 ENCOUNTER — HOSPITAL ENCOUNTER (OUTPATIENT)
Age: 86
Discharge: HOME | End: 2021-12-10
Attending: EMERGENCY MEDICINE
Payer: MEDICARE

## 2021-12-10 VITALS
RESPIRATION RATE: 20 BRPM | HEART RATE: 94 BPM | SYSTOLIC BLOOD PRESSURE: 163 MMHG | OXYGEN SATURATION: 100 % | DIASTOLIC BLOOD PRESSURE: 65 MMHG | TEMPERATURE: 97 F

## 2021-12-10 DIAGNOSIS — G89.29 CHRONIC RIGHT-SIDED THORACIC BACK PAIN: Primary | ICD-10-CM

## 2021-12-10 DIAGNOSIS — M54.6 CHRONIC RIGHT-SIDED THORACIC BACK PAIN: Primary | ICD-10-CM

## 2021-12-10 PROCEDURE — 99214 OFFICE O/P EST MOD 30 MIN: CPT

## 2021-12-10 PROCEDURE — 99213 OFFICE O/P EST LOW 20 MIN: CPT

## 2021-12-10 PROCEDURE — 96372 THER/PROPH/DIAG INJ SC/IM: CPT

## 2021-12-10 RX ORDER — KETOROLAC TROMETHAMINE 30 MG/ML
30 INJECTION, SOLUTION INTRAMUSCULAR; INTRAVENOUS ONCE
Status: COMPLETED | OUTPATIENT
Start: 2021-12-10 | End: 2021-12-10

## 2021-12-10 NOTE — ED INITIAL ASSESSMENT (HPI)
Patient with right sided back spasms x 1 week, worsening overnight. Denies numbness tingling. Denies trauma. States she attends pt twice weekly.

## 2021-12-13 PROBLEM — E46 PROTEIN-CALORIE MALNUTRITION, UNSPECIFIED SEVERITY (HCC): Status: ACTIVE | Noted: 2021-12-13

## 2021-12-23 NOTE — ED PROVIDER NOTES
Patient Seen in: [facility]      History   Patient presents with:  Back Pain    Stated Complaint: BACK SPASMS    Subjective:   HPI    79 yo female with h/o chronic muscle spasms, seen here frequently for toradol shot.  C/o spasm to the right m MANAGEMENT   • PATIENT DOCUMENTED NOT TO HAVE EXPERIENCED ANY OF THE FOLLOWING EVENTS N/A 1/19/2015    Procedure: LUMBAR EPIDURAL;  Surgeon: Shanell Russell MD;  Location: [facility]   • PATIENT DOCUMENTED NOT TO HAVE EXPERIENCED ANY OF regular rhythm. Pulmonary:      Effort: Pulmonary effort is normal. No respiratory distress. Musculoskeletal:      Cervical back: Normal range of motion and neck supple. Comments: No midline spinal tenderness.  There is right thoracic paraspinal te

## 2021-12-31 ENCOUNTER — HOSPITAL ENCOUNTER (OUTPATIENT)
Age: 86
Discharge: HOME | End: 2021-12-31
Payer: MEDICARE

## 2021-12-31 VITALS
TEMPERATURE: 98 F | HEART RATE: 94 BPM | RESPIRATION RATE: 20 BRPM | DIASTOLIC BLOOD PRESSURE: 59 MMHG | OXYGEN SATURATION: 98 % | SYSTOLIC BLOOD PRESSURE: 149 MMHG

## 2021-12-31 DIAGNOSIS — G89.29 CHRONIC LEFT-SIDED THORACIC BACK PAIN: Primary | ICD-10-CM

## 2021-12-31 DIAGNOSIS — M54.6 CHRONIC LEFT-SIDED THORACIC BACK PAIN: Primary | ICD-10-CM

## 2021-12-31 PROCEDURE — 99214 OFFICE O/P EST MOD 30 MIN: CPT

## 2021-12-31 PROCEDURE — 96372 THER/PROPH/DIAG INJ SC/IM: CPT

## 2021-12-31 PROCEDURE — 99213 OFFICE O/P EST LOW 20 MIN: CPT

## 2021-12-31 RX ORDER — KETOROLAC TROMETHAMINE 30 MG/ML
30 INJECTION, SOLUTION INTRAMUSCULAR; INTRAVENOUS ONCE
Status: COMPLETED | OUTPATIENT
Start: 2021-12-31 | End: 2021-12-31

## 2021-12-31 NOTE — ED PROVIDER NOTES
Patient Seen in: [facility]      History   Patient presents with:  Back Pain    Stated Complaint: BACK SPASM    Subjective:   HPI    This is a well appearing 81 y/o female with a history of chronic back pain who presents with L sided thoracic TO HAVE EXPERIENCED ANY OF THE FOLLOWING EVENTS N/A 1/6/2015    Procedure: LUMBAR EPIDURAL;  Surgeon: Luz Elena Alanis MD;  Location: [address] [place] [address]   • PATIENT DOCUMENTED NOT TO HAVE EXPERIENCED ANY OF THE FOLLOWING EVENTS N/A 1/19/2015 Pulse 94   Temp 97.7 °F (36.5 °C) (Temporal)   Resp 20   SpO2 98%         Physical Exam  Vitals and nursing note reviewed. Constitutional:       General: She is awake. She is not in acute distress. Appearance: Normal appearance.  She is not ill-appear symptoms. Denies any back pain at this time. Able to ambulate with steady upper gait exit. Close follow-up and strict ER precautions given. Case discussed with Dr. Sancho Russell who agrees with plan of care.      Disposition and Plan     Clinical Impression:

## 2022-01-23 ENCOUNTER — HOSPITAL ENCOUNTER (OUTPATIENT)
Age: 87
Discharge: HOME | End: 2022-01-23
Attending: EMERGENCY MEDICINE
Payer: MEDICARE

## 2022-01-23 VITALS
TEMPERATURE: 97 F | RESPIRATION RATE: 17 BRPM | DIASTOLIC BLOOD PRESSURE: 81 MMHG | SYSTOLIC BLOOD PRESSURE: 145 MMHG | OXYGEN SATURATION: 100 % | HEART RATE: 98 BPM

## 2022-01-23 DIAGNOSIS — M54.50 LOW BACK PAIN AT MULTIPLE SITES: Primary | ICD-10-CM

## 2022-01-23 DIAGNOSIS — M62.838 SPASM OF MUSCLE: ICD-10-CM

## 2022-01-23 PROCEDURE — 99214 OFFICE O/P EST MOD 30 MIN: CPT

## 2022-01-23 PROCEDURE — 96372 THER/PROPH/DIAG INJ SC/IM: CPT

## 2022-01-23 PROCEDURE — 99213 OFFICE O/P EST LOW 20 MIN: CPT

## 2022-01-23 RX ORDER — KETOROLAC TROMETHAMINE 30 MG/ML
30 INJECTION, SOLUTION INTRAMUSCULAR; INTRAVENOUS ONCE
Status: COMPLETED | OUTPATIENT
Start: 2022-01-23 | End: 2022-01-23

## 2022-01-23 NOTE — ED PROVIDER NOTES
Patient Seen in: [facility]      History   Patient presents with:  Pain    Stated Complaint: Back spasms    Subjective:   HPI    This is a 71-year-old female presents to the immediate care with recurrent or acute on chronic right-sided low ba LUMBAR/SACRAL N/A 1/28/2015    Procedure: LUMBAR EPIDURAL;  Surgeon: Lashon Hackett MD;  Location: [address] [place] [address]   • PATIENT DOCUMENTED NOT TO HAVE EXPERIENCED ANY OF THE FOLLOWING EVENTS N/A 1/6/2015    Procedure: LUMBAR EPIDURAL;  Surge systems reviewed and are negative. Positive for stated complaint: Back spasms  Other systems are as noted in HPI. Constitutional and vital signs reviewed. All other systems reviewed and negative except as noted above.     Physical Exam     ED Tri Muscle spasm, lumbago, acute on chronic low back pain    Patient ambulatory in the immediate care to and from waiting room without assistance       Tx: Toradol 30mg IM  Checked recent labs;  Cr normal       Disposition and Plan     Clinical Impression:

## 2022-01-30 ENCOUNTER — HOSPITAL ENCOUNTER (OUTPATIENT)
Age: 87
Discharge: HOME | End: 2022-01-30
Attending: EMERGENCY MEDICINE
Payer: MEDICARE

## 2022-01-30 VITALS
SYSTOLIC BLOOD PRESSURE: 135 MMHG | RESPIRATION RATE: 18 BRPM | HEART RATE: 96 BPM | DIASTOLIC BLOOD PRESSURE: 63 MMHG | OXYGEN SATURATION: 98 % | TEMPERATURE: 98 F

## 2022-01-30 DIAGNOSIS — M54.50 ACUTE EXACERBATION OF CHRONIC LOW BACK PAIN: Primary | ICD-10-CM

## 2022-01-30 DIAGNOSIS — G89.29 ACUTE EXACERBATION OF CHRONIC LOW BACK PAIN: Primary | ICD-10-CM

## 2022-01-30 PROCEDURE — 96372 THER/PROPH/DIAG INJ SC/IM: CPT

## 2022-01-30 PROCEDURE — 99214 OFFICE O/P EST MOD 30 MIN: CPT

## 2022-01-30 RX ORDER — KETOROLAC TROMETHAMINE 30 MG/ML
30 INJECTION, SOLUTION INTRAMUSCULAR; INTRAVENOUS ONCE
Status: COMPLETED | OUTPATIENT
Start: 2022-01-30 | End: 2022-01-30

## 2022-01-30 NOTE — ED INITIAL ASSESSMENT (HPI)
Patient with chronic history of lower back pain. Was seen at the  on 1/23/22 and received 30 mg of Toradol injection. States pain is not relieved. Attends pt twice weekly.

## 2022-02-19 ENCOUNTER — HOSPITAL ENCOUNTER (OUTPATIENT)
Age: 87
Discharge: HOME | End: 2022-02-19
Attending: EMERGENCY MEDICINE
Payer: MEDICARE

## 2022-02-19 VITALS
SYSTOLIC BLOOD PRESSURE: 162 MMHG | TEMPERATURE: 98 F | HEART RATE: 88 BPM | DIASTOLIC BLOOD PRESSURE: 68 MMHG | RESPIRATION RATE: 22 BRPM

## 2022-02-19 DIAGNOSIS — M54.6 ACUTE LEFT-SIDED THORACIC BACK PAIN: Primary | ICD-10-CM

## 2022-02-19 PROCEDURE — 96372 THER/PROPH/DIAG INJ SC/IM: CPT

## 2022-02-19 PROCEDURE — 99213 OFFICE O/P EST LOW 20 MIN: CPT

## 2022-02-19 PROCEDURE — 99214 OFFICE O/P EST MOD 30 MIN: CPT

## 2022-02-19 RX ORDER — KETOROLAC TROMETHAMINE 30 MG/ML
30 INJECTION, SOLUTION INTRAMUSCULAR; INTRAVENOUS ONCE
Status: COMPLETED | OUTPATIENT
Start: 2022-02-19 | End: 2022-02-19

## 2022-02-19 NOTE — ED INITIAL ASSESSMENT (HPI)
Presents with chronic back pain. States left upper back \"spasms\" kept her up all night. Requests Toradol injection.

## 2022-04-24 ENCOUNTER — HOSPITAL ENCOUNTER (OUTPATIENT)
Age: 87
Discharge: HOME | End: 2022-04-24
Attending: EMERGENCY MEDICINE
Payer: MEDICARE

## 2022-04-24 VITALS
RESPIRATION RATE: 20 BRPM | SYSTOLIC BLOOD PRESSURE: 174 MMHG | OXYGEN SATURATION: 100 % | TEMPERATURE: 99 F | HEART RATE: 88 BPM | DIASTOLIC BLOOD PRESSURE: 68 MMHG

## 2022-04-24 DIAGNOSIS — M62.830 SPASM OF THORACIC BACK MUSCLE: Primary | ICD-10-CM

## 2022-04-24 PROCEDURE — 96372 THER/PROPH/DIAG INJ SC/IM: CPT

## 2022-04-24 PROCEDURE — 99214 OFFICE O/P EST MOD 30 MIN: CPT

## 2022-04-24 RX ORDER — KETOROLAC TROMETHAMINE 30 MG/ML
30 INJECTION, SOLUTION INTRAMUSCULAR; INTRAVENOUS ONCE
Status: COMPLETED | OUTPATIENT
Start: 2022-04-24 | End: 2022-04-24

## 2022-04-24 NOTE — ED INITIAL ASSESSMENT (HPI)
Patient reports back spasms to the right side of her back. Patient reports taking Tramadol and Tylenol for her pain, however it has not helped.

## 2022-05-01 ENCOUNTER — HOSPITAL ENCOUNTER (OUTPATIENT)
Age: 87
Discharge: HOME | End: 2022-05-01
Attending: EMERGENCY MEDICINE
Payer: MEDICARE

## 2022-05-01 VITALS
HEART RATE: 86 BPM | SYSTOLIC BLOOD PRESSURE: 148 MMHG | DIASTOLIC BLOOD PRESSURE: 65 MMHG | TEMPERATURE: 98 F | RESPIRATION RATE: 22 BRPM | OXYGEN SATURATION: 98 %

## 2022-05-01 DIAGNOSIS — G89.29 CHRONIC BILATERAL LOW BACK PAIN WITHOUT SCIATICA: Primary | ICD-10-CM

## 2022-05-01 DIAGNOSIS — M54.50 CHRONIC BILATERAL LOW BACK PAIN WITHOUT SCIATICA: Primary | ICD-10-CM

## 2022-05-01 PROCEDURE — 96372 THER/PROPH/DIAG INJ SC/IM: CPT

## 2022-05-01 PROCEDURE — 99214 OFFICE O/P EST MOD 30 MIN: CPT

## 2022-05-01 PROCEDURE — 99213 OFFICE O/P EST LOW 20 MIN: CPT

## 2022-05-01 RX ORDER — KETOROLAC TROMETHAMINE 30 MG/ML
30 INJECTION, SOLUTION INTRAMUSCULAR; INTRAVENOUS ONCE
Status: COMPLETED | OUTPATIENT
Start: 2022-05-01 | End: 2022-05-01

## 2022-12-13 ENCOUNTER — HOSPITAL ENCOUNTER (OUTPATIENT)
Age: 87
Discharge: HOME | End: 2022-12-13
Payer: MEDICARE

## 2022-12-13 VITALS
RESPIRATION RATE: 18 BRPM | SYSTOLIC BLOOD PRESSURE: 140 MMHG | TEMPERATURE: 97 F | HEART RATE: 90 BPM | DIASTOLIC BLOOD PRESSURE: 58 MMHG | OXYGEN SATURATION: 95 %

## 2022-12-13 DIAGNOSIS — T14.8XXA ABRASION: Primary | ICD-10-CM

## 2022-12-13 PROCEDURE — 99212 OFFICE O/P EST SF 10 MIN: CPT

## 2022-12-13 RX ORDER — NON-ADHERENT BANDAGE 3"X4"
1 BANDAGE TOPICAL 2 TIMES DAILY
Qty: 20 EACH | Refills: 0 | Status: SHIPPED | OUTPATIENT
Start: 2022-12-13

## 2022-12-13 NOTE — DISCHARGE INSTRUCTIONS
Wash the wound twice a day with plain soap and water and use the antibiotic ointment twice a day. Use the nonstick dressings to prevent further injury. You can also use the gauze wrap when putting tape on your leg. If you develop a fever, redness, swelling or drainage you should go to the emergency department. Can make an appointment with the wound clinic if things do not get better. Otherwise follow-up with your primary care doctor. Your blood pressure was mildly elevated today when you arrived, please make sure you follow-up with your primary care doctor.

## 2022-12-13 NOTE — ED INITIAL ASSESSMENT (HPI)
Pt presents with skin tear to left shin. Pt reports, \"my lower legs are always bruised and discolored\". Pt reports left shin started bleeding 2 days ago. No known trauma or trauma. Bandages applied by pt.

## 2022-12-16 ENCOUNTER — OFFICE VISIT (OUTPATIENT)
Dept: WOUND CARE | Facility: HOSPITAL | Age: 87
End: 2022-12-16
Attending: FAMILY MEDICINE
Payer: MEDICARE

## 2022-12-16 VITALS
HEIGHT: 60 IN | WEIGHT: 100 LBS | RESPIRATION RATE: 18 BRPM | TEMPERATURE: 97 F | OXYGEN SATURATION: 98 % | BODY MASS INDEX: 19.63 KG/M2 | DIASTOLIC BLOOD PRESSURE: 59 MMHG | HEART RATE: 64 BPM | SYSTOLIC BLOOD PRESSURE: 122 MMHG

## 2022-12-16 DIAGNOSIS — S81.802A OPEN WOUND OF LEFT LOWER LEG, INITIAL ENCOUNTER: Primary | ICD-10-CM

## 2022-12-16 PROCEDURE — 99214 OFFICE O/P EST MOD 30 MIN: CPT

## 2022-12-16 NOTE — PATIENT INSTRUCTIONS
PATIENT INSTRUCTIONS      FOR GEOVANNI Leigh 1930    DATE OF SERVICE: 2022        Apply Xeroform gauze to the left leg wound. Cover this with a few layers of gauze roll to hold in place and secure with tape. Do not put any tape on your skin. Change this dressing on a daily basis. You may shower but do not submerging the wound underwater.         Nurse visit on  and     Follow up with Dr. Ronit Gomez 3 WEEKS

## 2022-12-16 NOTE — PROGRESS NOTES
Weekly Wound Education Note    Teaching Provided To: Patient  Training Topics: Cleasing and general instructions;Dressing  Training Method: Demonstration;Explain/Verbal  Training Response: Patient responds and understands        Notes: started on xeroform dressing

## 2022-12-22 ENCOUNTER — NURSE ONLY (OUTPATIENT)
Dept: WOUND CARE | Facility: HOSPITAL | Age: 87
End: 2022-12-22
Attending: FAMILY MEDICINE
Payer: MEDICARE

## 2022-12-22 VITALS
HEART RATE: 81 BPM | DIASTOLIC BLOOD PRESSURE: 70 MMHG | SYSTOLIC BLOOD PRESSURE: 140 MMHG | TEMPERATURE: 97 F | RESPIRATION RATE: 18 BRPM | OXYGEN SATURATION: 99 %

## 2022-12-22 PROCEDURE — 99213 OFFICE O/P EST LOW 20 MIN: CPT

## 2022-12-22 NOTE — PROGRESS NOTES
Weekly Wound Education Note    Teaching Provided To: Patient  Training Topics: Cleasing and general instructions;Dressing  Training Method: Demonstration;Explain/Verbal  Training Response: Patient responds and understands        Notes: cont.  xeroform dressing

## 2022-12-23 ENCOUNTER — APPOINTMENT (OUTPATIENT)
Dept: WOUND CARE | Facility: HOSPITAL | Age: 87
End: 2022-12-23
Payer: MEDICARE

## 2022-12-30 ENCOUNTER — NURSE ONLY (OUTPATIENT)
Dept: WOUND CARE | Facility: HOSPITAL | Age: 87
End: 2022-12-30
Attending: FAMILY MEDICINE
Payer: MEDICARE

## 2022-12-30 VITALS
RESPIRATION RATE: 20 BRPM | OXYGEN SATURATION: 99 % | SYSTOLIC BLOOD PRESSURE: 161 MMHG | TEMPERATURE: 98 F | DIASTOLIC BLOOD PRESSURE: 74 MMHG | HEART RATE: 84 BPM

## 2022-12-30 DIAGNOSIS — R60.0 BILATERAL LEG EDEMA: ICD-10-CM

## 2022-12-30 PROCEDURE — 99213 OFFICE O/P EST LOW 20 MIN: CPT

## 2022-12-30 NOTE — PROGRESS NOTES
Weekly Wound Education Note    Teaching Provided To: Patient  Training Topics: Cleasing and general instructions;Dressing  Training Method: Demonstration;Explain/Verbal  Training Response: Patient responds and understands        Notes: Wound is improving continued xeroform dressing

## 2023-01-06 ENCOUNTER — OFFICE VISIT (OUTPATIENT)
Dept: WOUND CARE | Facility: HOSPITAL | Age: 88
End: 2023-01-06
Attending: FAMILY MEDICINE
Payer: MEDICARE

## 2023-01-06 VITALS
DIASTOLIC BLOOD PRESSURE: 66 MMHG | RESPIRATION RATE: 20 BRPM | TEMPERATURE: 98 F | SYSTOLIC BLOOD PRESSURE: 131 MMHG | OXYGEN SATURATION: 99 % | HEART RATE: 86 BPM

## 2023-01-06 DIAGNOSIS — R60.0 BILATERAL LEG EDEMA: ICD-10-CM

## 2023-01-06 DIAGNOSIS — S81.802D OPEN WOUND OF LEFT LOWER LEG, SUBSEQUENT ENCOUNTER: Primary | ICD-10-CM

## 2023-01-06 PROCEDURE — 99214 OFFICE O/P EST MOD 30 MIN: CPT | Performed by: FAMILY MEDICINE

## 2023-01-06 NOTE — PROGRESS NOTES
Weekly Wound Education Note    Teaching Provided To: Patient  Training Topics: Skin care;Dressing  Training Method: Explain/Verbal  Training Response: Patient responds and understands        Notes: wound is healed, aquacel foam secured with spandage applied for protection

## 2023-01-12 ENCOUNTER — APPOINTMENT (OUTPATIENT)
Dept: WOUND CARE | Facility: HOSPITAL | Age: 88
End: 2023-01-12
Attending: NURSE PRACTITIONER
Payer: OTHER GOVERNMENT

## 2023-01-19 ENCOUNTER — HOSPITAL ENCOUNTER (OUTPATIENT)
Age: 88
Discharge: HOME | End: 2023-01-19
Attending: EMERGENCY MEDICINE
Payer: MEDICARE

## 2023-01-19 VITALS
HEART RATE: 92 BPM | RESPIRATION RATE: 20 BRPM | TEMPERATURE: 97 F | SYSTOLIC BLOOD PRESSURE: 154 MMHG | DIASTOLIC BLOOD PRESSURE: 73 MMHG | OXYGEN SATURATION: 100 %

## 2023-01-19 DIAGNOSIS — S81.812A LEG LACERATION, LEFT, INITIAL ENCOUNTER: Primary | ICD-10-CM

## 2023-01-19 PROCEDURE — 99212 OFFICE O/P EST SF 10 MIN: CPT

## 2023-01-19 NOTE — ED INITIAL ASSESSMENT (HPI)
Patient with left leg skin tear sustained 3 days ago, states she bumped into something in her home. Area with serosanguinous drainage.

## 2023-02-18 ENCOUNTER — HOSPITAL ENCOUNTER (OUTPATIENT)
Age: 88
Discharge: HOME | End: 2023-02-18
Attending: EMERGENCY MEDICINE
Payer: MEDICARE

## 2023-02-18 ENCOUNTER — APPOINTMENT (OUTPATIENT)
Dept: ULTRASOUND IMAGING | Age: 88
End: 2023-02-18
Attending: EMERGENCY MEDICINE
Payer: MEDICARE

## 2023-02-18 VITALS
RESPIRATION RATE: 20 BRPM | HEART RATE: 86 BPM | SYSTOLIC BLOOD PRESSURE: 136 MMHG | DIASTOLIC BLOOD PRESSURE: 62 MMHG | TEMPERATURE: 98 F | OXYGEN SATURATION: 98 %

## 2023-02-18 DIAGNOSIS — M79.89 SWELLING OF RIGHT LOWER EXTREMITY: Primary | ICD-10-CM

## 2023-02-18 PROCEDURE — 99213 OFFICE O/P EST LOW 20 MIN: CPT

## 2023-02-18 PROCEDURE — 93971 EXTREMITY STUDY: CPT | Performed by: EMERGENCY MEDICINE

## 2023-02-18 PROCEDURE — 99214 OFFICE O/P EST MOD 30 MIN: CPT

## 2023-02-18 NOTE — ED INITIAL ASSESSMENT (HPI)
Woke up 2 days ago with swelling to RLE. No trauma. Denies pain. Mild redness present with swelling. + distal CMS.

## 2023-02-25 ENCOUNTER — APPOINTMENT (OUTPATIENT)
Dept: ULTRASOUND IMAGING | Age: 88
End: 2023-02-25
Attending: EMERGENCY MEDICINE
Payer: MEDICARE

## 2023-02-25 ENCOUNTER — APPOINTMENT (OUTPATIENT)
Dept: ULTRASOUND IMAGING | Age: 88
DRG: 300 | End: 2023-02-25
Attending: EMERGENCY MEDICINE
Payer: MEDICARE

## 2023-02-25 ENCOUNTER — HOSPITAL ENCOUNTER (OUTPATIENT)
Age: 88
Discharge: ANOTHER HOSPITAL | End: 2023-02-25
Attending: EMERGENCY MEDICINE
Payer: MEDICARE

## 2023-02-25 ENCOUNTER — HOSPITAL ENCOUNTER (INPATIENT)
Facility: HOSPITAL | Age: 88
LOS: 7 days | Discharge: HOME | End: 2023-03-04
Attending: EMERGENCY MEDICINE | Admitting: INTERNAL MEDICINE
Payer: MEDICARE

## 2023-02-25 ENCOUNTER — HOSPITAL ENCOUNTER (INPATIENT)
Facility: HOSPITAL | Age: 88
LOS: 7 days | Discharge: HOME | DRG: 300 | End: 2023-03-04
Attending: EMERGENCY MEDICINE | Admitting: INTERNAL MEDICINE
Payer: MEDICARE

## 2023-02-25 ENCOUNTER — HOSPITAL ENCOUNTER (OUTPATIENT)
Age: 88
Discharge: ANOTHER HOSPITAL | DRG: 300 | End: 2023-02-25
Attending: EMERGENCY MEDICINE
Payer: MEDICARE

## 2023-02-25 VITALS
DIASTOLIC BLOOD PRESSURE: 59 MMHG | SYSTOLIC BLOOD PRESSURE: 108 MMHG | TEMPERATURE: 98 F | HEART RATE: 98 BPM | RESPIRATION RATE: 20 BRPM | OXYGEN SATURATION: 100 %

## 2023-02-25 DIAGNOSIS — D64.9 ANEMIA, UNSPECIFIED TYPE: ICD-10-CM

## 2023-02-25 DIAGNOSIS — I82.411 ACUTE DEEP VEIN THROMBOSIS (DVT) OF FEMORAL VEIN OF RIGHT LOWER EXTREMITY (HCC): Primary | ICD-10-CM

## 2023-02-25 DIAGNOSIS — I82.461 ACUTE DEEP VEIN THROMBOSIS (DVT) OF CALF MUSCLE VEIN OF RIGHT LOWER EXTREMITY (HCC): Primary | ICD-10-CM

## 2023-02-25 LAB
ALBUMIN SERPL-MCNC: 3.3 G/DL (ref 3.4–5)
ALP LIVER SERPL-CCNC: 95 U/L
ALT SERPL-CCNC: 20 U/L
ANION GAP SERPL CALC-SCNC: 5 MMOL/L (ref 0–18)
ANTIBODY SCREEN: NEGATIVE
APTT PPP: 33.1 SECONDS (ref 23.3–35.6)
AST SERPL-CCNC: 22 U/L (ref 15–37)
BASOPHILS # BLD AUTO: 0.08 X10(3) UL (ref 0–0.2)
BASOPHILS # BLD AUTO: 0.09 X10(3) UL (ref 0–0.2)
BASOPHILS NFR BLD AUTO: 1 %
BASOPHILS NFR BLD AUTO: 1 %
BILIRUB DIRECT SERPL-MCNC: <0.1 MG/DL (ref 0–0.2)
BILIRUB SERPL-MCNC: 0.2 MG/DL (ref 0.1–2)
BUN BLD-MCNC: 21 MG/DL (ref 7–18)
BUN BLD-MCNC: 24 MG/DL (ref 7–18)
BUN/CREAT SERPL: 36.9 (ref 10–20)
CALCIUM BLD-MCNC: 9.2 MG/DL (ref 8.5–10.1)
CHLORIDE BLD-SCNC: 100 MMOL/L (ref 98–112)
CHLORIDE SERPL-SCNC: 107 MMOL/L (ref 98–112)
CO2 BLD-SCNC: 27 MMOL/L (ref 21–32)
CO2 SERPL-SCNC: 27 MMOL/L (ref 21–32)
CREAT BLD-MCNC: 0.6 MG/DL
CREAT BLD-MCNC: 0.65 MG/DL
DEPRECATED HBV CORE AB SER IA-ACNC: 3.4 NG/ML
DEPRECATED RDW RBC AUTO: 42.5 FL (ref 35.1–46.3)
DEPRECATED RDW RBC AUTO: 42.9 FL (ref 35.1–46.3)
EOSINOPHIL # BLD AUTO: 0.07 X10(3) UL (ref 0–0.7)
EOSINOPHIL # BLD AUTO: 0.08 X10(3) UL (ref 0–0.7)
EOSINOPHIL NFR BLD AUTO: 0.8 %
EOSINOPHIL NFR BLD AUTO: 0.9 %
ERYTHROCYTE [DISTWIDTH] IN BLOOD BY AUTOMATED COUNT: 17.2 % (ref 11–15)
ERYTHROCYTE [DISTWIDTH] IN BLOOD BY AUTOMATED COUNT: 17.3 % (ref 11–15)
GFR SERPLBLD BASED ON 1.73 SQ M-ARVRAT: 83 ML/MIN/1.73M2 (ref 60–?)
GFR SERPLBLD BASED ON 1.73 SQ M-ARVRAT: 84 ML/MIN/1.73M2 (ref 60–?)
GLUCOSE BLD-MCNC: 107 MG/DL (ref 70–99)
GLUCOSE BLD-MCNC: 155 MG/DL (ref 70–99)
HAPTOGLOB SERPL-MCNC: 136 MG/DL (ref 30–200)
HCT VFR BLD AUTO: 23.4 %
HCT VFR BLD AUTO: 23.8 %
HCT VFR BLD AUTO: 24.8 %
HCT VFR BLD CALC: 26 %
HGB BLD-MCNC: 6.4 G/DL
HGB BLD-MCNC: 6.5 G/DL
HGB BLD-MCNC: 6.6 G/DL
HGB RETIC QN AUTO: 17.4 PG (ref 28.2–36.6)
IMM GRANULOCYTES # BLD AUTO: 0.02 X10(3) UL (ref 0–1)
IMM GRANULOCYTES # BLD AUTO: 0.03 X10(3) UL (ref 0–1)
IMM GRANULOCYTES NFR BLD: 0.2 %
IMM GRANULOCYTES NFR BLD: 0.4 %
IMM RETICS NFR: 0.2 RATIO (ref 0.1–0.3)
IRON SATN MFR SERPL: 3 %
IRON SERPL-MCNC: 12 UG/DL
ISTAT IONIZED CALCIUM FOR CHEM 8: 1.26 MMOL/L (ref 1.12–1.32)
LDH SERPL L TO P-CCNC: 184 U/L
LYMPHOCYTES # BLD AUTO: 1.11 X10(3) UL (ref 1–4)
LYMPHOCYTES # BLD AUTO: 1.24 X10(3) UL (ref 1–4)
LYMPHOCYTES NFR BLD AUTO: 13.2 %
LYMPHOCYTES NFR BLD AUTO: 14.2 %
MCH RBC QN AUTO: 18.8 PG (ref 26–34)
MCH RBC QN AUTO: 19.1 PG (ref 26–34)
MCH RBC QN AUTO: <20 PG (ref 26–34)
MCHC RBC AUTO-ENTMCNC: 26.2 G/DL (ref 31–37)
MCHC RBC AUTO-ENTMCNC: 27.4 G/DL (ref 31–37)
MCHC RBC AUTO-ENTMCNC: 27.7 G/DL (ref 31–37)
MCV RBC AUTO: 68.8 FL
MCV RBC AUTO: 69 FL
MCV RBC AUTO: 69.5 FL (ref 80–100)
MONOCYTES # BLD AUTO: 0.52 X10(3) UL (ref 0.1–1)
MONOCYTES # BLD AUTO: 0.71 X10(3) UL (ref 0.1–1)
MONOCYTES NFR BLD AUTO: 6 %
MONOCYTES NFR BLD AUTO: 8.5 %
NEUTROPHILS # BLD AUTO: 6.39 X10 (3) UL (ref 1.5–7.7)
NEUTROPHILS # BLD AUTO: 6.39 X10(3) UL (ref 1.5–7.7)
NEUTROPHILS # BLD AUTO: 6.76 X10 (3) UL (ref 1.5–7.7)
NEUTROPHILS # BLD AUTO: 6.76 X10(3) UL (ref 1.5–7.7)
NEUTROPHILS NFR BLD AUTO: 76.1 %
NEUTROPHILS NFR BLD AUTO: 77.7 %
OSMOLALITY SERPL CALC.SUM OF ELEC: 295 MOSM/KG (ref 275–295)
PLATELET # BLD AUTO: 324 10(3)UL (ref 150–450)
PLATELET # BLD AUTO: 335 10(3)UL (ref 150–450)
PLATELET # BLD AUTO: 343 X10ˆ3/UL (ref 150–450)
POTASSIUM BLD-SCNC: 3.9 MMOL/L (ref 3.6–5.1)
POTASSIUM SERPL-SCNC: 3.5 MMOL/L (ref 3.5–5.1)
PROT SERPL-MCNC: 6.5 G/DL (ref 6.4–8.2)
RBC # BLD AUTO: 3.4 X10(6)UL
RBC # BLD AUTO: 3.45 X10(6)UL
RBC # BLD AUTO: 3.57 X10ˆ6/UL
RETICS # AUTO: 64.9 X10(3) UL (ref 22.5–147.5)
RETICS/RBC NFR AUTO: 1.9 %
RH BLOOD TYPE: POSITIVE
RH BLOOD TYPE: POSITIVE
SARS-COV-2 RNA RESP QL NAA+PROBE: NOT DETECTED
SODIUM BLD-SCNC: 137 MMOL/L (ref 136–145)
SODIUM SERPL-SCNC: 139 MMOL/L (ref 136–145)
TIBC SERPL-MCNC: 477 UG/DL (ref 240–450)
TRANSFERRIN SERPL-MCNC: 320 MG/DL (ref 200–360)
TSI SER-ACNC: 3.52 MIU/ML (ref 0.36–3.74)
WBC # BLD AUTO: 8.1 X10ˆ3/UL (ref 4–11)
WBC # BLD AUTO: 8.4 X10(3) UL (ref 4–11)
WBC # BLD AUTO: 8.7 X10(3) UL (ref 4–11)

## 2023-02-25 PROCEDURE — 36430 TRANSFUSION BLD/BLD COMPNT: CPT

## 2023-02-25 PROCEDURE — 83521 IG LIGHT CHAINS FREE EACH: CPT | Performed by: INTERNAL MEDICINE

## 2023-02-25 PROCEDURE — 86900 BLOOD TYPING SEROLOGIC ABO: CPT | Performed by: EMERGENCY MEDICINE

## 2023-02-25 PROCEDURE — 84165 PROTEIN E-PHORESIS SERUM: CPT | Performed by: INTERNAL MEDICINE

## 2023-02-25 PROCEDURE — 83010 ASSAY OF HAPTOGLOBIN QUANT: CPT | Performed by: INTERNAL MEDICINE

## 2023-02-25 PROCEDURE — 96374 THER/PROPH/DIAG INJ IV PUSH: CPT

## 2023-02-25 PROCEDURE — 82272 OCCULT BLD FECES 1-3 TESTS: CPT

## 2023-02-25 PROCEDURE — 96376 TX/PRO/DX INJ SAME DRUG ADON: CPT

## 2023-02-25 PROCEDURE — 84466 ASSAY OF TRANSFERRIN: CPT | Performed by: EMERGENCY MEDICINE

## 2023-02-25 PROCEDURE — 83540 ASSAY OF IRON: CPT | Performed by: EMERGENCY MEDICINE

## 2023-02-25 PROCEDURE — 30233N1 TRANSFUSION OF NONAUTOLOGOUS RED BLOOD CELLS INTO PERIPHERAL VEIN, PERCUTANEOUS APPROACH: ICD-10-PCS | Performed by: EMERGENCY MEDICINE

## 2023-02-25 PROCEDURE — 84443 ASSAY THYROID STIM HORMONE: CPT | Performed by: INTERNAL MEDICINE

## 2023-02-25 PROCEDURE — 85025 COMPLETE CBC W/AUTO DIFF WBC: CPT | Performed by: EMERGENCY MEDICINE

## 2023-02-25 PROCEDURE — 85730 THROMBOPLASTIN TIME PARTIAL: CPT | Performed by: EMERGENCY MEDICINE

## 2023-02-25 PROCEDURE — 86850 RBC ANTIBODY SCREEN: CPT | Performed by: EMERGENCY MEDICINE

## 2023-02-25 PROCEDURE — 36415 COLL VENOUS BLD VENIPUNCTURE: CPT

## 2023-02-25 PROCEDURE — 86920 COMPATIBILITY TEST SPIN: CPT

## 2023-02-25 PROCEDURE — 83615 LACTATE (LD) (LDH) ENZYME: CPT | Performed by: INTERNAL MEDICINE

## 2023-02-25 PROCEDURE — 82728 ASSAY OF FERRITIN: CPT | Performed by: EMERGENCY MEDICINE

## 2023-02-25 PROCEDURE — 99291 CRITICAL CARE FIRST HOUR: CPT

## 2023-02-25 PROCEDURE — 80047 BASIC METABLC PNL IONIZED CA: CPT

## 2023-02-25 PROCEDURE — 99285 EMERGENCY DEPT VISIT HI MDM: CPT

## 2023-02-25 PROCEDURE — 86901 BLOOD TYPING SEROLOGIC RH(D): CPT | Performed by: EMERGENCY MEDICINE

## 2023-02-25 PROCEDURE — 86334 IMMUNOFIX E-PHORESIS SERUM: CPT | Performed by: INTERNAL MEDICINE

## 2023-02-25 PROCEDURE — 93971 EXTREMITY STUDY: CPT | Performed by: EMERGENCY MEDICINE

## 2023-02-25 PROCEDURE — 85045 AUTOMATED RETICULOCYTE COUNT: CPT | Performed by: INTERNAL MEDICINE

## 2023-02-25 PROCEDURE — 80076 HEPATIC FUNCTION PANEL: CPT | Performed by: EMERGENCY MEDICINE

## 2023-02-25 PROCEDURE — 80048 BASIC METABOLIC PNL TOTAL CA: CPT | Performed by: EMERGENCY MEDICINE

## 2023-02-25 PROCEDURE — 85060 BLOOD SMEAR INTERPRETATION: CPT | Performed by: EMERGENCY MEDICINE

## 2023-02-25 PROCEDURE — 99214 OFFICE O/P EST MOD 30 MIN: CPT

## 2023-02-25 RX ORDER — SENNOSIDES 8.6 MG
17.2 TABLET ORAL NIGHTLY PRN
Status: DISCONTINUED | OUTPATIENT
Start: 2023-02-25 | End: 2023-03-04

## 2023-02-25 RX ORDER — HEPARIN SODIUM 1000 [USP'U]/ML
80 INJECTION, SOLUTION INTRAVENOUS; SUBCUTANEOUS ONCE
Status: COMPLETED | OUTPATIENT
Start: 2023-02-25 | End: 2023-02-25

## 2023-02-25 RX ORDER — ACETAMINOPHEN 325 MG/1
325 TABLET ORAL DAILY PRN
COMMUNITY

## 2023-02-25 RX ORDER — FUROSEMIDE 20 MG/1
20 TABLET ORAL DAILY
Status: DISCONTINUED | OUTPATIENT
Start: 2023-02-26 | End: 2023-03-04

## 2023-02-25 RX ORDER — ACETAMINOPHEN 500 MG
500 TABLET ORAL EVERY 4 HOURS PRN
Status: DISCONTINUED | OUTPATIENT
Start: 2023-02-25 | End: 2023-03-04

## 2023-02-25 RX ORDER — ONDANSETRON 2 MG/ML
4 INJECTION INTRAMUSCULAR; INTRAVENOUS EVERY 6 HOURS PRN
Status: DISCONTINUED | OUTPATIENT
Start: 2023-02-25 | End: 2023-03-04

## 2023-02-25 RX ORDER — HYDROCODONE BITARTRATE AND ACETAMINOPHEN 5; 325 MG/1; MG/1
2 TABLET ORAL EVERY 4 HOURS PRN
Status: DISCONTINUED | OUTPATIENT
Start: 2023-02-25 | End: 2023-03-04

## 2023-02-25 RX ORDER — TIZANIDINE 4 MG/1
4 TABLET ORAL ONCE
Status: COMPLETED | OUTPATIENT
Start: 2023-02-25 | End: 2023-02-25

## 2023-02-25 RX ORDER — TRAMADOL HYDROCHLORIDE 50 MG/1
50 TABLET ORAL ONCE
Status: COMPLETED | OUTPATIENT
Start: 2023-02-25 | End: 2023-02-26

## 2023-02-25 RX ORDER — HEPARIN SODIUM AND DEXTROSE 10000; 5 [USP'U]/100ML; G/100ML
18 INJECTION INTRAVENOUS ONCE
Status: COMPLETED | OUTPATIENT
Start: 2023-02-25 | End: 2023-02-26

## 2023-02-25 RX ORDER — METOCLOPRAMIDE HYDROCHLORIDE 5 MG/ML
5 INJECTION INTRAMUSCULAR; INTRAVENOUS EVERY 8 HOURS PRN
Status: DISCONTINUED | OUTPATIENT
Start: 2023-02-25 | End: 2023-03-04

## 2023-02-25 RX ORDER — BISACODYL 10 MG
10 SUPPOSITORY, RECTAL RECTAL
Status: DISCONTINUED | OUTPATIENT
Start: 2023-02-25 | End: 2023-03-04

## 2023-02-25 RX ORDER — ACETAMINOPHEN 325 MG/1
650 TABLET ORAL EVERY 4 HOURS PRN
Status: DISCONTINUED | OUTPATIENT
Start: 2023-02-25 | End: 2023-03-04

## 2023-02-25 RX ORDER — HEPARIN SODIUM AND DEXTROSE 10000; 5 [USP'U]/100ML; G/100ML
INJECTION INTRAVENOUS CONTINUOUS
Status: DISCONTINUED | OUTPATIENT
Start: 2023-02-26 | End: 2023-03-03

## 2023-02-25 RX ORDER — POLYETHYLENE GLYCOL 3350 17 G/17G
17 POWDER, FOR SOLUTION ORAL DAILY PRN
Status: DISCONTINUED | OUTPATIENT
Start: 2023-02-25 | End: 2023-03-04

## 2023-02-25 RX ORDER — SODIUM PHOSPHATE, DIBASIC AND SODIUM PHOSPHATE, MONOBASIC 7; 19 G/133ML; G/133ML
1 ENEMA RECTAL ONCE AS NEEDED
Status: DISCONTINUED | OUTPATIENT
Start: 2023-02-25 | End: 2023-03-04

## 2023-02-25 RX ORDER — HYDROCODONE BITARTRATE AND ACETAMINOPHEN 5; 325 MG/1; MG/1
1 TABLET ORAL EVERY 4 HOURS PRN
Status: DISCONTINUED | OUTPATIENT
Start: 2023-02-25 | End: 2023-03-04

## 2023-02-25 NOTE — ED INITIAL ASSESSMENT (HPI)
Pt reports \"I was at the immediate care because of my right leg being swollen. I was dx with a dvt to my right leg. When they checked my labs my hgb was low\". Hgb was 6.5. Pt denies lightheaded/ dizziness/ sob/ cp. Denies taking blood thinners.

## 2023-02-25 NOTE — ED QUICK NOTES
Orders for admission, patient is aware of plan and ready to go upstairs. Any questions, please call ED RN Charlotte Lorenzo at extension [phone]. Patient Covid vaccination status: Fully vaccinated     COVID Test Ordered in ED: Rapid SARS-CoV-2 by PCR    COVID Suspicion at Admission: N/A    Running Infusions:  None    Mental Status/LOC at time of transport: a/ox4    Other pertinent information: Heparin drip pending PRBC completion, incompatible infusions per pharmacy. Bolus given.     CIWA score: N/A   NIH score:  N/A

## 2023-02-25 NOTE — ED INITIAL ASSESSMENT (HPI)
Patient reports over 1 week  Hx of right leg swelling. Had an ultrasound performed last week, it was negative for dvt at that time.

## 2023-02-26 ENCOUNTER — APPOINTMENT (OUTPATIENT)
Dept: CT IMAGING | Facility: HOSPITAL | Age: 88
DRG: 300 | End: 2023-02-26
Attending: INTERNAL MEDICINE
Payer: MEDICARE

## 2023-02-26 ENCOUNTER — APPOINTMENT (OUTPATIENT)
Dept: CT IMAGING | Facility: HOSPITAL | Age: 88
End: 2023-02-26
Attending: INTERNAL MEDICINE
Payer: MEDICARE

## 2023-02-26 LAB
ANION GAP SERPL CALC-SCNC: 3 MMOL/L (ref 0–18)
APTT PPP: 69.2 SECONDS (ref 23.3–35.6)
APTT PPP: 96.4 SECONDS (ref 23.3–35.6)
BUN BLD-MCNC: 19 MG/DL (ref 7–18)
BUN/CREAT SERPL: 37.3 (ref 10–20)
CALCIUM BLD-MCNC: 8.8 MG/DL (ref 8.5–10.1)
CHLORIDE SERPL-SCNC: 106 MMOL/L (ref 98–112)
CO2 SERPL-SCNC: 28 MMOL/L (ref 21–32)
CREAT BLD-MCNC: 0.51 MG/DL
DEPRECATED RDW RBC AUTO: 49.1 FL (ref 35.1–46.3)
ERYTHROCYTE [DISTWIDTH] IN BLOOD BY AUTOMATED COUNT: 19.3 % (ref 11–15)
GFR SERPLBLD BASED ON 1.73 SQ M-ARVRAT: 88 ML/MIN/1.73M2 (ref 60–?)
GLUCOSE BLD-MCNC: 137 MG/DL (ref 70–99)
HCT VFR BLD AUTO: 28.5 %
HGB BLD-MCNC: 8.7 G/DL
IGA SERPL-MCNC: 225 MG/DL (ref 70–312)
MCH RBC QN AUTO: 21.7 PG (ref 26–34)
MCHC RBC AUTO-ENTMCNC: 30.5 G/DL (ref 31–37)
MCV RBC AUTO: 71.1 FL
OSMOLALITY SERPL CALC.SUM OF ELEC: 288 MOSM/KG (ref 275–295)
PLATELET # BLD AUTO: 314 10(3)UL (ref 150–450)
POTASSIUM SERPL-SCNC: 3.6 MMOL/L (ref 3.5–5.1)
RBC # BLD AUTO: 4.01 X10(6)UL
SODIUM SERPL-SCNC: 137 MMOL/L (ref 136–145)
WBC # BLD AUTO: 9 X10(3) UL (ref 4–11)

## 2023-02-26 PROCEDURE — 85730 THROMBOPLASTIN TIME PARTIAL: CPT | Performed by: INTERNAL MEDICINE

## 2023-02-26 PROCEDURE — 86364 TISS TRNSGLTMNASE EA IG CLAS: CPT | Performed by: INTERNAL MEDICINE

## 2023-02-26 PROCEDURE — 82784 ASSAY IGA/IGD/IGG/IGM EACH: CPT | Performed by: INTERNAL MEDICINE

## 2023-02-26 PROCEDURE — 74177 CT ABD & PELVIS W/CONTRAST: CPT | Performed by: INTERNAL MEDICINE

## 2023-02-26 PROCEDURE — 85027 COMPLETE CBC AUTOMATED: CPT | Performed by: INTERNAL MEDICINE

## 2023-02-26 PROCEDURE — 80048 BASIC METABOLIC PNL TOTAL CA: CPT | Performed by: INTERNAL MEDICINE

## 2023-02-26 RX ORDER — TIZANIDINE 4 MG/1
4 TABLET ORAL 3 TIMES DAILY
Status: DISCONTINUED | OUTPATIENT
Start: 2023-02-26 | End: 2023-03-04

## 2023-02-26 RX ORDER — PANTOPRAZOLE SODIUM 40 MG/1
40 TABLET, DELAYED RELEASE ORAL
Status: DISCONTINUED | OUTPATIENT
Start: 2023-02-26 | End: 2023-03-04

## 2023-02-26 RX ORDER — TRAMADOL HYDROCHLORIDE 50 MG/1
50 TABLET ORAL EVERY 12 HOURS
Status: DISCONTINUED | OUTPATIENT
Start: 2023-02-26 | End: 2023-03-01

## 2023-02-26 NOTE — PLAN OF CARE
Pt slept intermittently overnight. Had 1 unit of PRBC in ER for anemia and started on a Heparin drip due to right leg swelling and dx of R femoral vein thrombus. Pt is concerned more of her back pain/spasms and that the MD did not resume her home meds of Tizanidine and Tramadol. This caused her much anxiety and the on-call MD then ordered one time doses of each and then referred further orders to Dr. Rod Fan who admitted the pt and will see her today. Pt was up many times overnight trying different position changes and seeing if the chairs (recliner, couch, w/c) were more comfortable. Each time she was up in a chair for less than 5 min and was requesting to get back to bed. Assisted her with her transfers since she has the IV pump and pole to maneuver with a heparin drip. Problem: Patient Centered Care  Goal: Patient preferences are identified and integrated in the patient's plan of care  Description: Interventions:  - What would you like us to know as we care for you?  \"I go line dancing twice a week and I need my Tinazidine and Tramadol for my back spasms\"  - Provide timely, complete, and accurate information to patient/family  - Incorporate patient and family knowledge, values, beliefs, and cultural backgrounds into the planning and delivery of care  - Encourage patient/family to participate in care and decision-making at the level they choose  - Honor patient and family perspectives and choices  Outcome: Not Progressing     Problem: Patient/Family Goals  Goal: Patient/Family Long Term Goal  Description: Patient's Long Term Goal: \"this blood clot to go away\"    Interventions:  - Heparin drip   - PTT monitoring  -assessment of R leg  - See additional Care Plan goals for specific interventions  Outcome: Not Progressing  Goal: Patient/Family Short Term Goal  Description: Patient's Short Term Goal: \"they (the doctors) are going to have to figure out a plan for my back spasms if they do not want me to have Tizanidine or Tramadol\".     Interventions:   - assist with position changes and toileting  - offer PRNs  - See additional Care Plan goals for specific interventions  Outcome: Not Progressing

## 2023-02-26 NOTE — PLAN OF CARE
Pt receiving scheduled pain meds for back pain. CT abdomen to be done. Pt's daughter at bedside and updated on POC. Heparin @8U/hr; @ therapeutic level and recheck APTT tomorrow morning. Problem: Patient Centered Care  Goal: Patient preferences are identified and integrated in the patient's plan of care  Description: Interventions:  - What would you like us to know as we care for you? I go dancing twice a week   - Provide timely, complete, and accurate information to patient/family  - Incorporate patient and family knowledge, values, beliefs, and cultural backgrounds into the planning and delivery of care  - Encourage patient/family to participate in care and decision-making at the level they choose  - Honor patient and family perspectives and choices  Outcome: Progressing     Problem: Patient/Family Goals  Goal: Patient/Family Long Term Goal  Description: Patient's Long Term Goal: to go home    Interventions:  - monitor VS  - remote tele  - continous heparin drip  - pain management  - See additional Care Plan goals for specific interventions  Outcome: Progressing  Goal: Patient/Family Short Term Goal  Description: Patient's Short Term Goal: to feel better    Interventions:   - monitor VS  - remote tele  - continous heparin drip  - pain management  - See additional Care Plan goals for specific interventions  Outcome: Progressing     Problem: CARDIOVASCULAR - ADULT  Goal: Maintains optimal cardiac output and hemodynamic stability  Description: INTERVENTIONS:  - Monitor vital signs, rhythm, and trends  - Monitor for bleeding, hypotension and signs of decreased cardiac output  - Evaluate effectiveness of vasoactive medications to optimize hemodynamic stability  - Monitor arterial and/or venous puncture sites for bleeding and/or hematoma  - Assess quality of pulses, skin color and temperature  - Assess for signs of decreased coronary artery perfusion - ex.  Angina  - Evaluate fluid balance, assess for edema, trend weights  Outcome: Progressing  Goal: Absence of cardiac arrhythmias or at baseline  Description: INTERVENTIONS:  - Continuous cardiac monitoring, monitor vital signs, obtain 12 lead EKG if indicated  - Evaluate effectiveness of antiarrhythmic and heart rate control medications as ordered  - Initiate emergency measures for life threatening arrhythmias  - Monitor electrolytes and administer replacement therapy as ordered  Outcome: Progressing     Problem: METABOLIC/FLUID AND ELECTROLYTES - ADULT  Goal: Electrolytes maintained within normal limits  Description: INTERVENTIONS:  - Monitor labs and rhythm and assess patient for signs and symptoms of electrolyte imbalances  - Administer electrolyte replacement as ordered  - Monitor response to electrolyte replacements, including rhythm and repeat lab results as appropriate  - Fluid restriction as ordered  - Instruct patient on fluid and nutrition restrictions as appropriate  Outcome: Not Progressing  Goal: Hemodynamic stability and optimal renal function maintained  Description: INTERVENTIONS:  - Monitor labs and assess for signs and symptoms of volume excess or deficit  - Monitor intake, output and patient weight  - Monitor urine specific gravity, serum osmolarity and serum sodium as indicated or ordered  - Monitor response to interventions for patient's volume status, including labs, urine output, blood pressure (other measures as available)  - Encourage oral intake as appropriate  - Instruct patient on fluid and nutrition restrictions as appropriate  Outcome: Not Progressing     Problem: SKIN/TISSUE INTEGRITY - ADULT  Goal: Skin integrity remains intact  Description: INTERVENTIONS  - Assess and document risk factors for pressure ulcer development  - Assess and document skin integrity  - Monitor for areas of redness and/or skin breakdown  - Initiate interventions, skin care algorithm/standards of care as needed  Outcome: Progressing     Problem: HEMATOLOGIC - ADULT  Goal: Maintains hematologic stability  Description: INTERVENTIONS  - Assess for signs and symptoms of bleeding or hemorrhage  - Monitor labs and vital signs for trends  - Administer supportive blood products/factors, fluids and medications as ordered and appropriate  - Administer supportive blood products/factors as ordered and appropriate  Outcome: Progressing  Goal: Free from bleeding injury  Description: (Example usage: patient with low platelets)  INTERVENTIONS:  - Avoid intramuscular injections, enemas and rectal medication administration  - Ensure safe mobilization of patient  - Hold pressure on venipuncture sites to achieve adequate hemostasis  - Assess for signs and symptoms of internal bleeding  - Monitor lab trends  - Patient is to report abnormal signs of bleeding to staff  - Avoid use of toothpicks and dental floss  - Use electric shaver for shaving  - Use soft bristle tooth brush  - Limit straining and forceful nose blowing  Outcome: Progressing     Problem: Impaired Activities of Daily Living  Goal: Achieve highest/safest level of independence in self care  Description: Interventions:  - Assess ability and encourage patient to participate in ADLs to maximize function  - Promote sitting position while performing ADLs such as feeding, grooming, and bathing  - Educate and encourage patient/family in tolerated functional activity level and precautions during self-care    Outcome: Not Progressing

## 2023-02-26 NOTE — ED QUICK NOTES
Care taken over at this time, pt A&OX3 non labored breathing, speaks clear full sentences, pt has no complaints at this time, aware of admission and agreeable, remains on monitor, call light within reach, family at bedside, pt tolerating blood transfusion well has no complaints, second iv established.

## 2023-02-27 ENCOUNTER — APPOINTMENT (OUTPATIENT)
Dept: PICC SERVICES | Facility: HOSPITAL | Age: 88
End: 2023-02-27
Attending: STUDENT IN AN ORGANIZED HEALTH CARE EDUCATION/TRAINING PROGRAM
Payer: MEDICARE

## 2023-02-27 ENCOUNTER — APPOINTMENT (OUTPATIENT)
Dept: PICC SERVICES | Facility: HOSPITAL | Age: 88
DRG: 300 | End: 2023-02-27
Attending: STUDENT IN AN ORGANIZED HEALTH CARE EDUCATION/TRAINING PROGRAM
Payer: MEDICARE

## 2023-02-27 LAB
APTT PPP: 150.7 SECONDS (ref 23.3–35.6)
APTT PPP: 205.5 SECONDS (ref 23.3–35.6)
APTT PPP: 74.3 SECONDS (ref 23.3–35.6)
BLOOD TYPE BARCODE: 5100
DEPRECATED RDW RBC AUTO: 50.9 FL (ref 35.1–46.3)
ERYTHROCYTE [DISTWIDTH] IN BLOOD BY AUTOMATED COUNT: 20.2 % (ref 11–15)
HCT VFR BLD AUTO: 28.7 %
HGB BLD-MCNC: 8.3 G/DL
MCH RBC QN AUTO: 20.8 PG (ref 26–34)
MCHC RBC AUTO-ENTMCNC: 28.9 G/DL (ref 31–37)
MCV RBC AUTO: 71.8 FL
PLATELET # BLD AUTO: 298 10(3)UL (ref 150–450)
RBC # BLD AUTO: 4 X10(6)UL
WBC # BLD AUTO: 8.3 X10(3) UL (ref 4–11)

## 2023-02-27 PROCEDURE — 85027 COMPLETE CBC AUTOMATED: CPT | Performed by: INTERNAL MEDICINE

## 2023-02-27 PROCEDURE — 85730 THROMBOPLASTIN TIME PARTIAL: CPT | Performed by: INTERNAL MEDICINE

## 2023-02-27 PROCEDURE — 85730 THROMBOPLASTIN TIME PARTIAL: CPT | Performed by: STUDENT IN AN ORGANIZED HEALTH CARE EDUCATION/TRAINING PROGRAM

## 2023-02-27 RX ORDER — DOXEPIN HYDROCHLORIDE 50 MG/1
1 CAPSULE ORAL DAILY
Status: DISCONTINUED | OUTPATIENT
Start: 2023-02-27 | End: 2023-03-04

## 2023-02-27 NOTE — PLAN OF CARE
Patient and family updated on plan of care. Currently on heparin drip. PRN Tylenol given. Plan for EGD tomorrow. Call light within reach. Problem: Patient Centered Care  Goal: Patient preferences are identified and integrated in the patient's plan of care  Description: Interventions:  - What would you like us to know as we care for you? I go dancing twice a week   - Provide timely, complete, and accurate information to patient/family  - Incorporate patient and family knowledge, values, beliefs, and cultural backgrounds into the planning and delivery of care  - Encourage patient/family to participate in care and decision-making at the level they choose  - Honor patient and family perspectives and choices  Outcome: Progressing     Problem: Patient/Family Goals  Goal: Patient/Family Long Term Goal  Description: Patient's Long Term Goal: to go home    Interventions:  - monitor VS  - remote tele  - continous heparin drip  - pain management  - See additional Care Plan goals for specific interventions  Outcome: Progressing  Goal: Patient/Family Short Term Goal  Description: Patient's Short Term Goal: to feel better    Interventions:   - monitor VS  - remote tele  - continous heparin drip  - pain management  - See additional Care Plan goals for specific interventions  Outcome: Progressing     Problem: CARDIOVASCULAR - ADULT  Goal: Maintains optimal cardiac output and hemodynamic stability  Description: INTERVENTIONS:  - Monitor vital signs, rhythm, and trends  - Monitor for bleeding, hypotension and signs of decreased cardiac output  - Evaluate effectiveness of vasoactive medications to optimize hemodynamic stability  - Monitor arterial and/or venous puncture sites for bleeding and/or hematoma  - Assess quality of pulses, skin color and temperature  - Assess for signs of decreased coronary artery perfusion - ex.  Angina  - Evaluate fluid balance, assess for edema, trend weights  Outcome: Progressing  Goal: Absence of cardiac arrhythmias or at baseline  Description: INTERVENTIONS:  - Continuous cardiac monitoring, monitor vital signs, obtain 12 lead EKG if indicated  - Evaluate effectiveness of antiarrhythmic and heart rate control medications as ordered  - Initiate emergency measures for life threatening arrhythmias  - Monitor electrolytes and administer replacement therapy as ordered  Outcome: Progressing     Problem: METABOLIC/FLUID AND ELECTROLYTES - ADULT  Goal: Electrolytes maintained within normal limits  Description: INTERVENTIONS:  - Monitor labs and rhythm and assess patient for signs and symptoms of electrolyte imbalances  - Administer electrolyte replacement as ordered  - Monitor response to electrolyte replacements, including rhythm and repeat lab results as appropriate  - Fluid restriction as ordered  - Instruct patient on fluid and nutrition restrictions as appropriate  Outcome: Progressing  Goal: Hemodynamic stability and optimal renal function maintained  Description: INTERVENTIONS:  - Monitor labs and assess for signs and symptoms of volume excess or deficit  - Monitor intake, output and patient weight  - Monitor urine specific gravity, serum osmolarity and serum sodium as indicated or ordered  - Monitor response to interventions for patient's volume status, including labs, urine output, blood pressure (other measures as available)  - Encourage oral intake as appropriate  - Instruct patient on fluid and nutrition restrictions as appropriate  Outcome: Progressing     Problem: SKIN/TISSUE INTEGRITY - ADULT  Goal: Skin integrity remains intact  Description: INTERVENTIONS  - Assess and document risk factors for pressure ulcer development  - Assess and document skin integrity  - Monitor for areas of redness and/or skin breakdown  - Initiate interventions, skin care algorithm/standards of care as needed  Outcome: Progressing     Problem: HEMATOLOGIC - ADULT  Goal: Maintains hematologic stability  Description: INTERVENTIONS  - Assess for signs and symptoms of bleeding or hemorrhage  - Monitor labs and vital signs for trends  - Administer supportive blood products/factors, fluids and medications as ordered and appropriate  - Administer supportive blood products/factors as ordered and appropriate  Outcome: Progressing  Goal: Free from bleeding injury  Description: (Example usage: patient with low platelets)  INTERVENTIONS:  - Avoid intramuscular injections, enemas and rectal medication administration  - Ensure safe mobilization of patient  - Hold pressure on venipuncture sites to achieve adequate hemostasis  - Assess for signs and symptoms of internal bleeding  - Monitor lab trends  - Patient is to report abnormal signs of bleeding to staff  - Avoid use of toothpicks and dental floss  - Use electric shaver for shaving  - Use soft bristle tooth brush  - Limit straining and forceful nose blowing  Outcome: Progressing     Problem: Impaired Activities of Daily Living  Goal: Achieve highest/safest level of independence in self care  Description: Interventions:  - Assess ability and encourage patient to participate in ADLs to maximize function  - Promote sitting position while performing ADLs such as feeding, grooming, and bathing  - Educate and encourage patient/family in tolerated functional activity level and precautions during self-care  Outcome: Progressing

## 2023-02-27 NOTE — PLAN OF CARE
Patient being treated for DVT on RLE, on heparin GTT. Currently therapeutic to recheck levels in AM. No reports of bleeding. Pain reporting pain to back. Tylenol given PRN (refused tramadol). Patient AO X 4, 1 assist and continent. Patient looking forward to going home once heparin GTT is D/Cd. No notes regarding PO anticoags. Will endorse to day shift for f/up as patient asking. Problem: Patient Centered Care  Goal: Patient preferences are identified and integrated in the patient's plan of care  Description: Interventions:  - What would you like us to know as we care for you? I go dancing twice a week   - Provide timely, complete, and accurate information to patient/family  - Incorporate patient and family knowledge, values, beliefs, and cultural backgrounds into the planning and delivery of care  - Encourage patient/family to participate in care and decision-making at the level they choose  - Honor patient and family perspectives and choices  Outcome: Progressing     Problem: Patient/Family Goals  Goal: Patient/Family Long Term Goal  Description: Patient's Long Term Goal: to go home    Interventions:  - monitor VS  - remote tele  - continous heparin drip  - pain management  - See additional Care Plan goals for specific interventions  Outcome: Progressing     Problem: CARDIOVASCULAR - ADULT  Goal: Maintains optimal cardiac output and hemodynamic stability  Description: INTERVENTIONS:  - Monitor vital signs, rhythm, and trends  - Monitor for bleeding, hypotension and signs of decreased cardiac output  - Evaluate effectiveness of vasoactive medications to optimize hemodynamic stability  - Monitor arterial and/or venous puncture sites for bleeding and/or hematoma  - Assess quality of pulses, skin color and temperature  - Assess for signs of decreased coronary artery perfusion - ex.  Angina  - Evaluate fluid balance, assess for edema, trend weights  Outcome: Progressing

## 2023-02-28 LAB
APTT PPP: 37.4 SECONDS (ref 23.3–35.6)
APTT PPP: 70.1 SECONDS (ref 23.3–35.6)
APTT PPP: 73.6 SECONDS (ref 23.3–35.6)
DEPRECATED RDW RBC AUTO: 54.7 FL (ref 35.1–46.3)
ERYTHROCYTE [DISTWIDTH] IN BLOOD BY AUTOMATED COUNT: 21.3 % (ref 11–15)
HCT VFR BLD AUTO: 28.6 %
HGB BLD-MCNC: 8.1 G/DL
MCH RBC QN AUTO: 20.9 PG (ref 26–34)
MCHC RBC AUTO-ENTMCNC: 28.3 G/DL (ref 31–37)
MCV RBC AUTO: 73.9 FL
PLATELET # BLD AUTO: 290 10(3)UL (ref 150–450)
RBC # BLD AUTO: 3.87 X10(6)UL
WBC # BLD AUTO: 7.6 X10(3) UL (ref 4–11)

## 2023-02-28 PROCEDURE — 85027 COMPLETE CBC AUTOMATED: CPT | Performed by: INTERNAL MEDICINE

## 2023-02-28 PROCEDURE — 85730 THROMBOPLASTIN TIME PARTIAL: CPT | Performed by: STUDENT IN AN ORGANIZED HEALTH CARE EDUCATION/TRAINING PROGRAM

## 2023-02-28 PROCEDURE — 93010 ELECTROCARDIOGRAM REPORT: CPT | Performed by: INTERNAL MEDICINE

## 2023-02-28 PROCEDURE — 93005 ELECTROCARDIOGRAM TRACING: CPT

## 2023-02-28 RX ORDER — HEPARIN SODIUM 1000 [USP'U]/ML
30 INJECTION, SOLUTION INTRAVENOUS; SUBCUTANEOUS ONCE
Status: CANCELLED | OUTPATIENT
Start: 2023-02-28 | End: 2023-02-28

## 2023-02-28 RX ORDER — HYDRALAZINE HYDROCHLORIDE 20 MG/ML
10 INJECTION INTRAMUSCULAR; INTRAVENOUS EVERY 6 HOURS PRN
Status: ACTIVE | OUTPATIENT
Start: 2023-02-28 | End: 2023-03-01

## 2023-02-28 RX ORDER — HEPARIN SODIUM 1000 [USP'U]/ML
30 INJECTION, SOLUTION INTRAVENOUS; SUBCUTANEOUS ONCE
Status: COMPLETED | OUTPATIENT
Start: 2023-02-28 | End: 2023-02-28

## 2023-02-28 NOTE — PLAN OF CARE
PRN Tylenol and scheduled Tramadol. Unable to undergo EGD, needs cardiac clearance. Patient upset about unable to have procedure. Cardiology consulted. Patient and family updated on plan of care. Diet and heparin drip resumed per Dr. Margo Cherry. Call light within reach. Problem: Patient Centered Care  Goal: Patient preferences are identified and integrated in the patient's plan of care  Description: Interventions:  - What would you like us to know as we care for you?  I go dancing twice a week   - Provide timely, complete, and accurate information to patient/family  - Incorporate patient and family knowledge, values, beliefs, and cultural backgrounds into the planning and delivery of care  - Encourage patient/family to participate in care and decision-making at the level they choose  - Honor patient and family perspectives and choices  Outcome: Progressing     Problem: Patient/Family Goals  Goal: Patient/Family Long Term Goal  Description: Patient's Long Term Goal: to go home    Interventions:  - monitor VS  - remote tele  - continous heparin drip  - pain management  - See additional Care Plan goals for specific interventions  Outcome: Progressing  Goal: Patient/Family Short Term Goal  Description: Patient's Short Term Goal: to feel better    Interventions:   - monitor VS  - remote tele  - continous heparin drip  - pain management  - See additional Care Plan goals for specific interventions  Outcome: Progressing     Problem: CARDIOVASCULAR - ADULT  Goal: Maintains optimal cardiac output and hemodynamic stability  Description: INTERVENTIONS:  - Monitor vital signs, rhythm, and trends  - Monitor for bleeding, hypotension and signs of decreased cardiac output  - Evaluate effectiveness of vasoactive medications to optimize hemodynamic stability  - Monitor arterial and/or venous puncture sites for bleeding and/or hematoma  - Assess quality of pulses, skin color and temperature  - Assess for signs of decreased coronary artery perfusion - ex.  Angina  - Evaluate fluid balance, assess for edema, trend weights  Outcome: Progressing  Goal: Absence of cardiac arrhythmias or at baseline  Description: INTERVENTIONS:  - Continuous cardiac monitoring, monitor vital signs, obtain 12 lead EKG if indicated  - Evaluate effectiveness of antiarrhythmic and heart rate control medications as ordered  - Initiate emergency measures for life threatening arrhythmias  - Monitor electrolytes and administer replacement therapy as ordered  Outcome: Progressing     Problem: METABOLIC/FLUID AND ELECTROLYTES - ADULT  Goal: Electrolytes maintained within normal limits  Description: INTERVENTIONS:  - Monitor labs and rhythm and assess patient for signs and symptoms of electrolyte imbalances  - Administer electrolyte replacement as ordered  - Monitor response to electrolyte replacements, including rhythm and repeat lab results as appropriate  - Fluid restriction as ordered  - Instruct patient on fluid and nutrition restrictions as appropriate  Outcome: Progressing  Goal: Hemodynamic stability and optimal renal function maintained  Description: INTERVENTIONS:  - Monitor labs and assess for signs and symptoms of volume excess or deficit  - Monitor intake, output and patient weight  - Monitor urine specific gravity, serum osmolarity and serum sodium as indicated or ordered  - Monitor response to interventions for patient's volume status, including labs, urine output, blood pressure (other measures as available)  - Encourage oral intake as appropriate  - Instruct patient on fluid and nutrition restrictions as appropriate  Outcome: Progressing     Problem: SKIN/TISSUE INTEGRITY - ADULT  Goal: Skin integrity remains intact  Description: INTERVENTIONS  - Assess and document risk factors for pressure ulcer development  - Assess and document skin integrity  - Monitor for areas of redness and/or skin breakdown  - Initiate interventions, skin care algorithm/standards of care as needed  Outcome: Progressing     Problem: HEMATOLOGIC - ADULT  Goal: Maintains hematologic stability  Description: INTERVENTIONS  - Assess for signs and symptoms of bleeding or hemorrhage  - Monitor labs and vital signs for trends  - Administer supportive blood products/factors, fluids and medications as ordered and appropriate  - Administer supportive blood products/factors as ordered and appropriate  Outcome: Progressing  Goal: Free from bleeding injury  Description: (Example usage: patient with low platelets)  INTERVENTIONS:  - Avoid intramuscular injections, enemas and rectal medication administration  - Ensure safe mobilization of patient  - Hold pressure on venipuncture sites to achieve adequate hemostasis  - Assess for signs and symptoms of internal bleeding  - Monitor lab trends  - Patient is to report abnormal signs of bleeding to staff  - Avoid use of toothpicks and dental floss  - Use electric shaver for shaving  - Use soft bristle tooth brush  - Limit straining and forceful nose blowing  Outcome: Progressing     Problem: Impaired Activities of Daily Living  Goal: Achieve highest/safest level of independence in self care  Description: Interventions:  - Assess ability and encourage patient to participate in ADLs to maximize function  - Promote sitting position while performing ADLs such as feeding, grooming, and bathing  - Educate and encourage patient/family in tolerated functional activity level and precautions during self-care  - Encourage patient to incorporate impaired side during daily activities to promote function  Outcome: Progressing

## 2023-02-28 NOTE — PLAN OF CARE
Patient vital signs stable overnight. NPO overnight for EGD in the AM. No acute changes. Safety precautions in place, call light within reach. Problem: Patient Centered Care  Goal: Patient preferences are identified and integrated in the patient's plan of care  Description: Interventions:  - What would you like us to know as we care for you? I go dancing twice a week   - Provide timely, complete, and accurate information to patient/family  - Incorporate patient and family knowledge, values, beliefs, and cultural backgrounds into the planning and delivery of care  - Encourage patient/family to participate in care and decision-making at the level they choose  - Honor patient and family perspectives and choices  Outcome: Progressing     Problem: Patient/Family Goals  Goal: Patient/Family Long Term Goal  Description: Patient's Long Term Goal: to go home    Interventions:  - monitor VS  - remote tele  - continous heparin drip  - pain management  - See additional Care Plan goals for specific interventions  Outcome: Progressing  Goal: Patient/Family Short Term Goal  Description: Patient's Short Term Goal: to feel better    Interventions:   - monitor VS  - remote tele  - continous heparin drip  - pain management  - See additional Care Plan goals for specific interventions  Outcome: Progressing     Problem: CARDIOVASCULAR - ADULT  Goal: Maintains optimal cardiac output and hemodynamic stability  Description: INTERVENTIONS:  - Monitor vital signs, rhythm, and trends  - Monitor for bleeding, hypotension and signs of decreased cardiac output  - Evaluate effectiveness of vasoactive medications to optimize hemodynamic stability  - Monitor arterial and/or venous puncture sites for bleeding and/or hematoma  - Assess quality of pulses, skin color and temperature  - Assess for signs of decreased coronary artery perfusion - ex.  Angina  - Evaluate fluid balance, assess for edema, trend weights  Outcome: Progressing  Goal: Absence of cardiac arrhythmias or at baseline  Description: INTERVENTIONS:  - Continuous cardiac monitoring, monitor vital signs, obtain 12 lead EKG if indicated  - Evaluate effectiveness of antiarrhythmic and heart rate control medications as ordered  - Initiate emergency measures for life threatening arrhythmias  - Monitor electrolytes and administer replacement therapy as ordered  Outcome: Progressing     Problem: METABOLIC/FLUID AND ELECTROLYTES - ADULT  Goal: Electrolytes maintained within normal limits  Description: INTERVENTIONS:  - Monitor labs and rhythm and assess patient for signs and symptoms of electrolyte imbalances  - Administer electrolyte replacement as ordered  - Monitor response to electrolyte replacements, including rhythm and repeat lab results as appropriate  - Fluid restriction as ordered  - Instruct patient on fluid and nutrition restrictions as appropriate  Outcome: Progressing  Goal: Hemodynamic stability and optimal renal function maintained  Description: INTERVENTIONS:  - Monitor labs and assess for signs and symptoms of volume excess or deficit  - Monitor intake, output and patient weight  - Monitor urine specific gravity, serum osmolarity and serum sodium as indicated or ordered  - Monitor response to interventions for patient's volume status, including labs, urine output, blood pressure (other measures as available)  - Encourage oral intake as appropriate  - Instruct patient on fluid and nutrition restrictions as appropriate  Outcome: Progressing     Problem: SKIN/TISSUE INTEGRITY - ADULT  Goal: Skin integrity remains intact  Description: INTERVENTIONS  - Assess and document risk factors for pressure ulcer development  - Assess and document skin integrity  - Monitor for areas of redness and/or skin breakdown  - Initiate interventions, skin care algorithm/standards of care as needed  Outcome: Progressing     Problem: HEMATOLOGIC - ADULT  Goal: Maintains hematologic stability  Description: INTERVENTIONS  - Assess for signs and symptoms of bleeding or hemorrhage  - Monitor labs and vital signs for trends  - Administer supportive blood products/factors, fluids and medications as ordered and appropriate  - Administer supportive blood products/factors as ordered and appropriate  Outcome: Progressing  Goal: Free from bleeding injury  Description: (Example usage: patient with low platelets)  INTERVENTIONS:  - Avoid intramuscular injections, enemas and rectal medication administration  - Ensure safe mobilization of patient  - Hold pressure on venipuncture sites to achieve adequate hemostasis  - Assess for signs and symptoms of internal bleeding  - Monitor lab trends  - Patient is to report abnormal signs of bleeding to staff  - Avoid use of toothpicks and dental floss  - Use electric shaver for shaving  - Use soft bristle tooth brush  - Limit straining and forceful nose blowing  Outcome: Progressing     Problem: Impaired Activities of Daily Living  Goal: Achieve highest/safest level of independence in self care  Description: Interventions:  - Assess ability and encourage patient to participate in ADLs to maximize function  - Promote sitting position while performing ADLs such as feeding, grooming, and bathing  - Educate and encourage patient/family in tolerated functional activity level and precautions during self-care  - Encourage patient to incorporate impaired side during daily activities to promote function  Outcome: Progressing

## 2023-03-01 ENCOUNTER — ANESTHESIA EVENT (OUTPATIENT)
Dept: ENDOSCOPY | Facility: HOSPITAL | Age: 88
End: 2023-03-01
Payer: MEDICARE

## 2023-03-01 ENCOUNTER — ANESTHESIA (OUTPATIENT)
Dept: ENDOSCOPY | Facility: HOSPITAL | Age: 88
End: 2023-03-01
Payer: MEDICARE

## 2023-03-01 LAB
ANION GAP SERPL CALC-SCNC: 4 MMOL/L (ref 0–18)
APTT PPP: 51.9 SECONDS (ref 23.3–35.6)
APTT PPP: 63.6 SECONDS (ref 23.3–35.6)
ATRIAL RATE: 77 BPM
BUN BLD-MCNC: 19 MG/DL (ref 7–18)
BUN/CREAT SERPL: 41.3 (ref 10–20)
CALCIUM BLD-MCNC: 9.2 MG/DL (ref 8.5–10.1)
CHLORIDE SERPL-SCNC: 109 MMOL/L (ref 98–112)
CO2 SERPL-SCNC: 28 MMOL/L (ref 21–32)
CREAT BLD-MCNC: 0.46 MG/DL
DEPRECATED RDW RBC AUTO: 55 FL (ref 35.1–46.3)
ERYTHROCYTE [DISTWIDTH] IN BLOOD BY AUTOMATED COUNT: 22.5 % (ref 11–15)
GFR SERPLBLD BASED ON 1.73 SQ M-ARVRAT: 90 ML/MIN/1.73M2 (ref 60–?)
GLUCOSE BLD-MCNC: 116 MG/DL (ref 70–99)
HCT VFR BLD AUTO: 29.3 %
HGB BLD-MCNC: 8.4 G/DL
MCH RBC QN AUTO: 21.3 PG (ref 26–34)
MCHC RBC AUTO-ENTMCNC: 28.7 G/DL (ref 31–37)
MCV RBC AUTO: 74.2 FL
NT-PROBNP SERPL-MCNC: 978 PG/ML (ref ?–450)
OSMOLALITY SERPL CALC.SUM OF ELEC: 295 MOSM/KG (ref 275–295)
P AXIS: 64 DEGREES
P-R INTERVAL: 162 MS
PLATELET # BLD AUTO: 276 10(3)UL (ref 150–450)
POTASSIUM SERPL-SCNC: 3.6 MMOL/L (ref 3.5–5.1)
Q-T INTERVAL: 368 MS
QRS DURATION: 100 MS
QTC CALCULATION (BEZET): 416 MS
R AXIS: 34 DEGREES
RBC # BLD AUTO: 3.95 X10(6)UL
SARS-COV-2 RNA RESP QL NAA+PROBE: NOT DETECTED
SODIUM SERPL-SCNC: 141 MMOL/L (ref 136–145)
T AXIS: 47 DEGREES
VENTRICULAR RATE: 77 BPM
WBC # BLD AUTO: 8 X10(3) UL (ref 4–11)

## 2023-03-01 PROCEDURE — 80048 BASIC METABOLIC PNL TOTAL CA: CPT | Performed by: NURSE PRACTITIONER

## 2023-03-01 PROCEDURE — 83880 ASSAY OF NATRIURETIC PEPTIDE: CPT | Performed by: NURSE PRACTITIONER

## 2023-03-01 PROCEDURE — 0DJ08ZZ INSPECTION OF UPPER INTESTINAL TRACT, VIA NATURAL OR ARTIFICIAL OPENING ENDOSCOPIC: ICD-10-PCS | Performed by: INTERNAL MEDICINE

## 2023-03-01 PROCEDURE — 85730 THROMBOPLASTIN TIME PARTIAL: CPT | Performed by: STUDENT IN AN ORGANIZED HEALTH CARE EDUCATION/TRAINING PROGRAM

## 2023-03-01 PROCEDURE — 85027 COMPLETE CBC AUTOMATED: CPT | Performed by: STUDENT IN AN ORGANIZED HEALTH CARE EDUCATION/TRAINING PROGRAM

## 2023-03-01 RX ORDER — TRAMADOL HYDROCHLORIDE 50 MG/1
50 TABLET ORAL EVERY 6 HOURS PRN
Status: DISCONTINUED | OUTPATIENT
Start: 2023-03-01 | End: 2023-03-04

## 2023-03-01 RX ORDER — NALOXONE HYDROCHLORIDE 0.4 MG/ML
80 INJECTION, SOLUTION INTRAMUSCULAR; INTRAVENOUS; SUBCUTANEOUS AS NEEDED
Status: DISCONTINUED | OUTPATIENT
Start: 2023-03-01 | End: 2023-03-01 | Stop reason: HOSPADM

## 2023-03-01 RX ORDER — LOSARTAN POTASSIUM 25 MG/1
25 TABLET ORAL DAILY
Status: DISCONTINUED | OUTPATIENT
Start: 2023-03-01 | End: 2023-03-04

## 2023-03-01 RX ORDER — LIDOCAINE HYDROCHLORIDE 10 MG/ML
INJECTION, SOLUTION EPIDURAL; INFILTRATION; INTRACAUDAL; PERINEURAL AS NEEDED
Status: DISCONTINUED | OUTPATIENT
Start: 2023-03-01 | End: 2023-03-01 | Stop reason: SURG

## 2023-03-01 RX ORDER — SODIUM CHLORIDE, SODIUM LACTATE, POTASSIUM CHLORIDE, CALCIUM CHLORIDE 600; 310; 30; 20 MG/100ML; MG/100ML; MG/100ML; MG/100ML
INJECTION, SOLUTION INTRAVENOUS CONTINUOUS
Status: DISCONTINUED | OUTPATIENT
Start: 2023-03-01 | End: 2023-03-03

## 2023-03-01 RX ORDER — SPIRONOLACTONE 25 MG/1
25 TABLET ORAL DAILY
Status: DISCONTINUED | OUTPATIENT
Start: 2023-03-01 | End: 2023-03-01

## 2023-03-01 RX ORDER — SODIUM CHLORIDE, SODIUM LACTATE, POTASSIUM CHLORIDE, CALCIUM CHLORIDE 600; 310; 30; 20 MG/100ML; MG/100ML; MG/100ML; MG/100ML
INJECTION, SOLUTION INTRAVENOUS CONTINUOUS PRN
Status: DISCONTINUED | OUTPATIENT
Start: 2023-03-01 | End: 2023-03-01 | Stop reason: SURG

## 2023-03-01 RX ADMIN — LIDOCAINE HYDROCHLORIDE 25 MG: 10 INJECTION, SOLUTION EPIDURAL; INFILTRATION; INTRACAUDAL; PERINEURAL at 13:56:00

## 2023-03-01 RX ADMIN — SODIUM CHLORIDE, SODIUM LACTATE, POTASSIUM CHLORIDE, CALCIUM CHLORIDE: 600; 310; 30; 20 INJECTION, SOLUTION INTRAVENOUS at 13:30:00

## 2023-03-01 NOTE — PLAN OF CARE
Heparin drip stopped prior to EGD which was done today. Up with minimal standby assist in the room. Verbalized unhappiness with lasix being given and is refusing to take it - Dr. Hilda Diez notified. Plan for another endo procedure tomorrow, per dtr. Problem: Patient Centered Care  Goal: Patient preferences are identified and integrated in the patient's plan of care  Description: Interventions:  - What would you like us to know as we care for you? I go dancing twice a week   - Provide timely, complete, and accurate information to patient/family  - Incorporate patient and family knowledge, values, beliefs, and cultural backgrounds into the planning and delivery of care  - Encourage patient/family to participate in care and decision-making at the level they choose  - Honor patient and family perspectives and choices  Outcome: Progressing     Problem: CARDIOVASCULAR - ADULT  Goal: Maintains optimal cardiac output and hemodynamic stability  Description: INTERVENTIONS:  - Monitor vital signs, rhythm, and trends  - Monitor for bleeding, hypotension and signs of decreased cardiac output  - Evaluate effectiveness of vasoactive medications to optimize hemodynamic stability  - Monitor arterial and/or venous puncture sites for bleeding and/or hematoma  - Assess quality of pulses, skin color and temperature  - Assess for signs of decreased coronary artery perfusion - ex.  Angina  - Evaluate fluid balance, assess for edema, trend weights  Outcome: Progressing  Goal: Absence of cardiac arrhythmias or at baseline  Description: INTERVENTIONS:  - Continuous cardiac monitoring, monitor vital signs, obtain 12 lead EKG if indicated  - Evaluate effectiveness of antiarrhythmic and heart rate control medications as ordered  - Initiate emergency measures for life threatening arrhythmias  - Monitor electrolytes and administer replacement therapy as ordered  Outcome: Progressing     Problem: METABOLIC/FLUID AND ELECTROLYTES - ADULT  Goal: Electrolytes maintained within normal limits  Description: INTERVENTIONS:  - Monitor labs and rhythm and assess patient for signs and symptoms of electrolyte imbalances  - Administer electrolyte replacement as ordered  - Monitor response to electrolyte replacements, including rhythm and repeat lab results as appropriate  - Fluid restriction as ordered  - Instruct patient on fluid and nutrition restrictions as appropriate  Outcome: Progressing  Goal: Hemodynamic stability and optimal renal function maintained  Description: INTERVENTIONS:  - Monitor labs and assess for signs and symptoms of volume excess or deficit  - Monitor intake, output and patient weight  - Monitor urine specific gravity, serum osmolarity and serum sodium as indicated or ordered  - Monitor response to interventions for patient's volume status, including labs, urine output, blood pressure (other measures as available)  - Encourage oral intake as appropriate  - Instruct patient on fluid and nutrition restrictions as appropriate  Outcome: Progressing     Problem: SKIN/TISSUE INTEGRITY - ADULT  Goal: Skin integrity remains intact  Description: INTERVENTIONS  - Assess and document risk factors for pressure ulcer development  - Assess and document skin integrity  - Monitor for areas of redness and/or skin breakdown  - Initiate interventions, skin care algorithm/standards of care as needed  Outcome: Progressing     Problem: HEMATOLOGIC - ADULT  Goal: Maintains hematologic stability  Description: INTERVENTIONS  - Assess for signs and symptoms of bleeding or hemorrhage  - Monitor labs and vital signs for trends  - Administer supportive blood products/factors, fluids and medications as ordered and appropriate  - Administer supportive blood products/factors as ordered and appropriate  Outcome: Progressing  Goal: Free from bleeding injury  Description: (Example usage: patient with low platelets)  INTERVENTIONS:  - Avoid intramuscular injections, enemas and rectal medication administration  - Ensure safe mobilization of patient  - Hold pressure on venipuncture sites to achieve adequate hemostasis  - Assess for signs and symptoms of internal bleeding  - Monitor lab trends  - Patient is to report abnormal signs of bleeding to staff  - Avoid use of toothpicks and dental floss  - Use electric shaver for shaving  - Use soft bristle tooth brush  - Limit straining and forceful nose blowing  Outcome: Progressing     Problem: Impaired Activities of Daily Living  Goal: Achieve highest/safest level of independence in self care  Description: Interventions:  - Assess ability and encourage patient to participate in ADLs to maximize function  - Promote sitting position while performing ADLs such as feeding, grooming, and bathing  - Educate and encourage patient/family in tolerated functional activity level and precautions during self-care    Outcome: Progressing     Problem: Patient/Family Goals  Goal: Patient/Family Long Term Goal  Description: Patient's Long Term Goal: to go home    Interventions:  - monitor VS  - remote tele  - continous heparin drip  - pain management  - See additional Care Plan goals for specific interventions  Outcome: Not Progressing  Goal: Patient/Family Short Term Goal  Description: Patient's Short Term Goal: to feel better    Interventions:   - monitor VS  - remote tele  - continous heparin drip  - pain management  - See additional Care Plan goals for specific interventions  Outcome: Not Progressing

## 2023-03-01 NOTE — PLAN OF CARE
Patient vital signs stable overnight. Patient informed this RN that she did not want anything to eat or drink past midnight and to hold AM Protonix in case she does go in for a procedure later in the day; patient is aware that at this time there is nothing currently scheduled. Heparin drip currently running. Pain controlled with scheduled medications. No acute changes. Problem: Patient Centered Care  Goal: Patient preferences are identified and integrated in the patient's plan of care  Description: Interventions:  - What would you like us to know as we care for you?  I go dancing twice a week   - Provide timely, complete, and accurate information to patient/family  - Incorporate patient and family knowledge, values, beliefs, and cultural backgrounds into the planning and delivery of care  - Encourage patient/family to participate in care and decision-making at the level they choose  - Honor patient and family perspectives and choices  Outcome: Progressing     Problem: Patient/Family Goals  Goal: Patient/Family Long Term Goal  Description: Patient's Long Term Goal: to go home    Interventions:  - monitor VS  - remote tele  - continous heparin drip  - pain management  - See additional Care Plan goals for specific interventions  Outcome: Progressing  Goal: Patient/Family Short Term Goal  Description: Patient's Short Term Goal: to feel better    Interventions:   - monitor VS  - remote tele  - continous heparin drip  - pain management  - See additional Care Plan goals for specific interventions  Outcome: Progressing     Problem: CARDIOVASCULAR - ADULT  Goal: Maintains optimal cardiac output and hemodynamic stability  Description: INTERVENTIONS:  - Monitor vital signs, rhythm, and trends  - Monitor for bleeding, hypotension and signs of decreased cardiac output  - Evaluate effectiveness of vasoactive medications to optimize hemodynamic stability  - Monitor arterial and/or venous puncture sites for bleeding and/or hematoma  - Assess quality of pulses, skin color and temperature  - Assess for signs of decreased coronary artery perfusion - ex.  Angina  - Evaluate fluid balance, assess for edema, trend weights  Outcome: Progressing  Goal: Absence of cardiac arrhythmias or at baseline  Description: INTERVENTIONS:  - Continuous cardiac monitoring, monitor vital signs, obtain 12 lead EKG if indicated  - Evaluate effectiveness of antiarrhythmic and heart rate control medications as ordered  - Initiate emergency measures for life threatening arrhythmias  - Monitor electrolytes and administer replacement therapy as ordered  Outcome: Progressing     Problem: METABOLIC/FLUID AND ELECTROLYTES - ADULT  Goal: Electrolytes maintained within normal limits  Description: INTERVENTIONS:  - Monitor labs and rhythm and assess patient for signs and symptoms of electrolyte imbalances  - Administer electrolyte replacement as ordered  - Monitor response to electrolyte replacements, including rhythm and repeat lab results as appropriate  - Fluid restriction as ordered  - Instruct patient on fluid and nutrition restrictions as appropriate  Outcome: Progressing  Goal: Hemodynamic stability and optimal renal function maintained  Description: INTERVENTIONS:  - Monitor labs and assess for signs and symptoms of volume excess or deficit  - Monitor intake, output and patient weight  - Monitor urine specific gravity, serum osmolarity and serum sodium as indicated or ordered  - Monitor response to interventions for patient's volume status, including labs, urine output, blood pressure (other measures as available)  - Encourage oral intake as appropriate  - Instruct patient on fluid and nutrition restrictions as appropriate  Outcome: Progressing     Problem: SKIN/TISSUE INTEGRITY - ADULT  Goal: Skin integrity remains intact  Description: INTERVENTIONS  - Assess and document risk factors for pressure ulcer development  - Assess and document skin integrity  - Monitor for areas of redness and/or skin breakdown  - Initiate interventions, skin care algorithm/standards of care as needed  Outcome: Progressing     Problem: HEMATOLOGIC - ADULT  Goal: Maintains hematologic stability  Description: INTERVENTIONS  - Assess for signs and symptoms of bleeding or hemorrhage  - Monitor labs and vital signs for trends  - Administer supportive blood products/factors, fluids and medications as ordered and appropriate  - Administer supportive blood products/factors as ordered and appropriate  Outcome: Progressing  Goal: Free from bleeding injury  Description: (Example usage: patient with low platelets)  INTERVENTIONS:  - Avoid intramuscular injections, enemas and rectal medication administration  - Ensure safe mobilization of patient  - Hold pressure on venipuncture sites to achieve adequate hemostasis  - Assess for signs and symptoms of internal bleeding  - Monitor lab trends  - Patient is to report abnormal signs of bleeding to staff  - Avoid use of toothpicks and dental floss  - Use electric shaver for shaving  - Use soft bristle tooth brush  - Limit straining and forceful nose blowing  Outcome: Progressing     Problem: Impaired Activities of Daily Living  Goal: Achieve highest/safest level of independence in self care  Description: Interventions:  - Assess ability and encourage patient to participate in ADLs to maximize function  - Promote sitting position while performing ADLs such as feeding, grooming, and bathing  - Educate and encourage patient/family in tolerated functional activity level and precautions during self-care  - Encourage patient to incorporate impaired side during daily activities to promote function  Outcome: Progressing

## 2023-03-01 NOTE — ANESTHESIA POSTPROCEDURE EVALUATION
Patient: Trupti Tinsley    Procedure Summary     Date: 03/01/23 Room / Location: [address] [place] [address] ENDOSCOPY 03 / [address] [place] [address] ENDOSCOPY    Anesthesia Start: 3884 Anesthesia Stop:     Procedure: ESOPHAGOGASTRODUODENOSCOPY (EGD) Diagnosis: (hiatal hernia, gastric mass)    Surgeons: Melva Terrell MD Anesthesiologist: Rebeka Whitley CRNA    Anesthesia Type: general ASA Status: 3          Anesthesia Type: general    Vitals Value Taken Time   /62 03/01/23 1415   Temp 0 03/01/23 1415   Pulse 89 03/01/23 1415   Resp 12 03/01/23 1415   SpO2 95 03/01/23 1415       [address] [place] [address] AN Post Evaluation:   Patient Evaluated in floor  Patient Participation: complete - patient participated  Level of Consciousness: awake and alert  Pain Score: 0  Pain Management: adequate  Airway Patency:patent  Dental exam unchanged from preop  Yes    Cardiovascular Status: acceptable  Respiratory Status: acceptable and nasal cannula  Postoperative Hydration acceptable  Comments: Report to [facility]      Hellen Bruce CRNA  3/1/2023 2:15 PM

## 2023-03-01 NOTE — H&P
The H&P dated 2/26/23 by Dr. Prema Alba was reviewed by Daniel Garibay MD today, the patient was examined and no significant changes have occurred in the patient's condition since the H&P was performed. I discussed with the patient and/or legal representative the potential benefits, risks and side effects of this procedure; the likelihood of the patient achieving goals; and potential problems that might occur during recuperation. I discussed reasonable alternatives to the procedure, including risks, benefits and side effects related to the alternatives and risks related to not receiving this procedure. We will proceed with procedure as planned. Informed consent was obtained for esophagogastroduodenoscopy with possible biopsy, dilation, polypectomy, therapy, banding and control of bleeding after explanation of risks, benefits and alternatives to the procedure. Risks include but not limited to bleeding, infection, perforation, missed polyps or cancer and risks of sedation.

## 2023-03-02 ENCOUNTER — ANESTHESIA EVENT (OUTPATIENT)
Dept: ENDOSCOPY | Facility: HOSPITAL | Age: 88
End: 2023-03-02
Payer: MEDICARE

## 2023-03-02 ENCOUNTER — ANESTHESIA (OUTPATIENT)
Dept: ENDOSCOPY | Facility: HOSPITAL | Age: 88
End: 2023-03-02
Payer: MEDICARE

## 2023-03-02 LAB
ALBUMIN SERPL ELPH-MCNC: 3.82 G/DL (ref 3.75–5.21)
ALBUMIN/GLOB SERPL: 1.43 {RATIO} (ref 1–2)
ALPHA1 GLOB SERPL ELPH-MCNC: 0.35 G/DL (ref 0.19–0.46)
ALPHA2 GLOB SERPL ELPH-MCNC: 0.76 G/DL (ref 0.48–1.05)
APTT PPP: 83.8 SECONDS (ref 23.3–35.6)
B-GLOBULIN SERPL ELPH-MCNC: 0.82 G/DL (ref 0.68–1.23)
DEPRECATED RDW RBC AUTO: 56.3 FL (ref 35.1–46.3)
ERYTHROCYTE [DISTWIDTH] IN BLOOD BY AUTOMATED COUNT: 24.4 % (ref 11–15)
GAMMA GLOB SERPL ELPH-MCNC: 0.75 G/DL (ref 0.62–1.7)
HCT VFR BLD AUTO: 28.1 %
HGB BLD-MCNC: 8.2 G/DL
KAPPA LC FREE SER-MCNC: 2.04 MG/DL (ref 0.33–1.94)
KAPPA LC FREE/LAMBDA FREE SER NEPH: 1.5 {RATIO} (ref 0.26–1.65)
LAMBDA LC FREE SERPL-MCNC: 1.36 MG/DL (ref 0.57–2.63)
MCH RBC QN AUTO: 21.5 PG (ref 26–34)
MCHC RBC AUTO-ENTMCNC: 29.2 G/DL (ref 31–37)
MCV RBC AUTO: 73.6 FL
PLATELET # BLD AUTO: 274 10(3)UL (ref 150–450)
PROT SERPL-MCNC: 6.5 G/DL (ref 6.4–8.2)
RBC # BLD AUTO: 3.82 X10(6)UL
TTG IGA SER-ACNC: 0.4 U/ML (ref ?–7)
WBC # BLD AUTO: 12.4 X10(3) UL (ref 4–11)

## 2023-03-02 PROCEDURE — 85027 COMPLETE CBC AUTOMATED: CPT | Performed by: STUDENT IN AN ORGANIZED HEALTH CARE EDUCATION/TRAINING PROGRAM

## 2023-03-02 PROCEDURE — 88342 IMHCHEM/IMCYTCHM 1ST ANTB: CPT | Performed by: INTERNAL MEDICINE

## 2023-03-02 PROCEDURE — 85730 THROMBOPLASTIN TIME PARTIAL: CPT | Performed by: STUDENT IN AN ORGANIZED HEALTH CARE EDUCATION/TRAINING PROGRAM

## 2023-03-02 PROCEDURE — 88341 IMHCHEM/IMCYTCHM EA ADD ANTB: CPT | Performed by: INTERNAL MEDICINE

## 2023-03-02 PROCEDURE — 88305 TISSUE EXAM BY PATHOLOGIST: CPT | Performed by: INTERNAL MEDICINE

## 2023-03-02 PROCEDURE — 0DB63ZX EXCISION OF STOMACH, PERCUTANEOUS APPROACH, DIAGNOSTIC: ICD-10-PCS | Performed by: INTERNAL MEDICINE

## 2023-03-02 PROCEDURE — 88360 TUMOR IMMUNOHISTOCHEM/MANUAL: CPT | Performed by: INTERNAL MEDICINE

## 2023-03-02 PROCEDURE — 88312 SPECIAL STAINS GROUP 1: CPT | Performed by: INTERNAL MEDICINE

## 2023-03-02 RX ORDER — DEXTROSE AND SODIUM CHLORIDE 5; .45 G/100ML; G/100ML
INJECTION, SOLUTION INTRAVENOUS CONTINUOUS
Status: DISCONTINUED | OUTPATIENT
Start: 2023-03-02 | End: 2023-03-03

## 2023-03-02 RX ORDER — LIDOCAINE HYDROCHLORIDE 10 MG/ML
INJECTION, SOLUTION EPIDURAL; INFILTRATION; INTRACAUDAL; PERINEURAL AS NEEDED
Status: DISCONTINUED | OUTPATIENT
Start: 2023-03-02 | End: 2023-03-02 | Stop reason: SURG

## 2023-03-02 RX ADMIN — LIDOCAINE HYDROCHLORIDE 50 MG: 10 INJECTION, SOLUTION EPIDURAL; INFILTRATION; INTRACAUDAL; PERINEURAL at 19:08:00

## 2023-03-02 NOTE — PLAN OF CARE
VSS.  Patient to have endoscopic ultrasound @5pm.  Heparin drip @7ml/hour, next ptt in am.  IVF infusing @83/hr. Problem: Patient Centered Care  Goal: Patient preferences are identified and integrated in the patient's plan of care  Description: Interventions:  - What would you like us to know as we care for you? I go dancing twice a week. From home alone. - Provide timely, complete, and accurate information to patient/family  - Incorporate patient and family knowledge, values, beliefs, and cultural backgrounds into the planning and delivery of care  - Encourage patient/family to participate in care and decision-making at the level they choose  - Honor patient and family perspectives and choices  Outcome: Progressing     Problem: Patient/Family Goals  Goal: Patient/Family Long Term Goal  Description: Patient's Long Term Goal: to go home    Interventions:  - monitor VS  - remote tele  - continous heparin drip  - pain management  - See additional Care Plan goals for specific interventions  Outcome: Progressing  Goal: Patient/Family Short Term Goal  Description: Patient's Short Term Goal: to feel better    Interventions:   - monitor VS  - remote tele  - continous heparin drip  - pain management  - See additional Care Plan goals for specific interventions  Outcome: Progressing     Problem: CARDIOVASCULAR - ADULT  Goal: Maintains optimal cardiac output and hemodynamic stability  Description: INTERVENTIONS:  - Monitor vital signs, rhythm, and trends  - Monitor for bleeding, hypotension and signs of decreased cardiac output  - Evaluate effectiveness of vasoactive medications to optimize hemodynamic stability  - Monitor arterial and/or venous puncture sites for bleeding and/or hematoma  - Assess quality of pulses, skin color and temperature  - Assess for signs of decreased coronary artery perfusion - ex.  Angina  - Evaluate fluid balance, assess for edema, trend weights  Outcome: Progressing  Goal: Absence of cardiac arrhythmias or at baseline  Description: INTERVENTIONS:  - Continuous cardiac monitoring, monitor vital signs, obtain 12 lead EKG if indicated  - Evaluate effectiveness of antiarrhythmic and heart rate control medications as ordered  - Initiate emergency measures for life threatening arrhythmias  - Monitor electrolytes and administer replacement therapy as ordered  Outcome: Progressing     Problem: METABOLIC/FLUID AND ELECTROLYTES - ADULT  Goal: Electrolytes maintained within normal limits  Description: INTERVENTIONS:  - Monitor labs and rhythm and assess patient for signs and symptoms of electrolyte imbalances  - Administer electrolyte replacement as ordered  - Monitor response to electrolyte replacements, including rhythm and repeat lab results as appropriate  - Fluid restriction as ordered  - Instruct patient on fluid and nutrition restrictions as appropriate  Outcome: Progressing  Goal: Hemodynamic stability and optimal renal function maintained  Description: INTERVENTIONS:  - Monitor labs and assess for signs and symptoms of volume excess or deficit  - Monitor intake, output and patient weight  - Monitor urine specific gravity, serum osmolarity and serum sodium as indicated or ordered  - Monitor response to interventions for patient's volume status, including labs, urine output, blood pressure (other measures as available)  - Encourage oral intake as appropriate  - Instruct patient on fluid and nutrition restrictions as appropriate  Outcome: Progressing     Problem: SKIN/TISSUE INTEGRITY - ADULT  Goal: Skin integrity remains intact  Description: INTERVENTIONS  - Assess and document risk factors for pressure ulcer development  - Assess and document skin integrity  - Monitor for areas of redness and/or skin breakdown  - Initiate interventions, skin care algorithm/standards of care as needed  Outcome: Progressing     Problem: HEMATOLOGIC - ADULT  Goal: Maintains hematologic stability  Description: INTERVENTIONS  - Assess for signs and symptoms of bleeding or hemorrhage  - Monitor labs and vital signs for trends  - Administer supportive blood products/factors, fluids and medications as ordered and appropriate  - Administer supportive blood products/factors as ordered and appropriate  Outcome: Progressing  Goal: Free from bleeding injury  Description: (Example usage: patient with low platelets)  INTERVENTIONS:  - Avoid intramuscular injections, enemas and rectal medication administration  - Ensure safe mobilization of patient  - Hold pressure on venipuncture sites to achieve adequate hemostasis  - Assess for signs and symptoms of internal bleeding  - Monitor lab trends  - Patient is to report abnormal signs of bleeding to staff  - Avoid use of toothpicks and dental floss  - Use electric shaver for shaving  - Use soft bristle tooth brush  - Limit straining and forceful nose blowing  Outcome: Progressing     Problem: Impaired Activities of Daily Living  Goal: Achieve highest/safest level of independence in self care  Description: Interventions:  - Assess ability and encourage patient to participate in ADLs to maximize function  - Promote sitting position while performing ADLs such as feeding, grooming, and bathing  - Educate and encourage patient/family in tolerated functional activity level and precautions during self-care  -   Outcome: Progressing

## 2023-03-02 NOTE — PLAN OF CARE
Problem: Patient Centered Care  Goal: Patient preferences are identified and integrated in the patient's plan of care  Description: Interventions:  - What would you like us to know as we care for you? I go dancing twice a week   - Provide timely, complete, and accurate information to patient/family  - Incorporate patient and family knowledge, values, beliefs, and cultural backgrounds into the planning and delivery of care  - Encourage patient/family to participate in care and decision-making at the level they choose  - Honor patient and family perspectives and choices  Outcome: Progressing     Problem: Patient/Family Goals  Goal: Patient/Family Long Term Goal  Description: Patient's Long Term Goal: to go home    Interventions:  - monitor VS  - remote tele  - continous heparin drip  - pain management  - See additional Care Plan goals for specific interventions  Outcome: Progressing  Goal: Patient/Family Short Term Goal  Description: Patient's Short Term Goal: to feel better    Interventions:   - monitor VS  - remote tele  - continous heparin drip  - pain management  - See additional Care Plan goals for specific interventions  Outcome: Progressing     Problem: CARDIOVASCULAR - ADULT  Goal: Maintains optimal cardiac output and hemodynamic stability  Description: INTERVENTIONS:  - Monitor vital signs, rhythm, and trends  - Monitor for bleeding, hypotension and signs of decreased cardiac output  - Evaluate effectiveness of vasoactive medications to optimize hemodynamic stability  - Monitor arterial and/or venous puncture sites for bleeding and/or hematoma  - Assess quality of pulses, skin color and temperature  - Assess for signs of decreased coronary artery perfusion - ex.  Angina  - Evaluate fluid balance, assess for edema, trend weights  Outcome: Progressing  Goal: Absence of cardiac arrhythmias or at baseline  Description: INTERVENTIONS:  - Continuous cardiac monitoring, monitor vital signs, obtain 12 lead EKG if indicated  - Evaluate effectiveness of antiarrhythmic and heart rate control medications as ordered  - Initiate emergency measures for life threatening arrhythmias  - Monitor electrolytes and administer replacement therapy as ordered  Outcome: Progressing     Problem: METABOLIC/FLUID AND ELECTROLYTES - ADULT  Goal: Electrolytes maintained within normal limits  Description: INTERVENTIONS:  - Monitor labs and rhythm and assess patient for signs and symptoms of electrolyte imbalances  - Administer electrolyte replacement as ordered  - Monitor response to electrolyte replacements, including rhythm and repeat lab results as appropriate  - Fluid restriction as ordered  - Instruct patient on fluid and nutrition restrictions as appropriate  Outcome: Progressing  Goal: Hemodynamic stability and optimal renal function maintained  Description: INTERVENTIONS:  - Monitor labs and assess for signs and symptoms of volume excess or deficit  - Monitor intake, output and patient weight  - Monitor urine specific gravity, serum osmolarity and serum sodium as indicated or ordered  - Monitor response to interventions for patient's volume status, including labs, urine output, blood pressure (other measures as available)  - Encourage oral intake as appropriate  - Instruct patient on fluid and nutrition restrictions as appropriate  Outcome: Progressing     Problem: SKIN/TISSUE INTEGRITY - ADULT  Goal: Skin integrity remains intact  Description: INTERVENTIONS  - Assess and document risk factors for pressure ulcer development  - Assess and document skin integrity  - Monitor for areas of redness and/or skin breakdown  - Initiate interventions, skin care algorithm/standards of care as needed  Outcome: Progressing     Problem: HEMATOLOGIC - ADULT  Goal: Maintains hematologic stability  Description: INTERVENTIONS  - Assess for signs and symptoms of bleeding or hemorrhage  - Monitor labs and vital signs for trends  - Administer supportive blood products/factors, fluids and medications as ordered and appropriate  - Administer supportive blood products/factors as ordered and appropriate  Outcome: Progressing  Goal: Free from bleeding injury  Description: (Example usage: patient with low platelets)  INTERVENTIONS:  - Avoid intramuscular injections, enemas and rectal medication administration  - Ensure safe mobilization of patient  - Hold pressure on venipuncture sites to achieve adequate hemostasis  - Assess for signs and symptoms of internal bleeding  - Monitor lab trends  - Patient is to report abnormal signs of bleeding to staff  - Avoid use of toothpicks and dental floss  - Use electric shaver for shaving  - Use soft bristle tooth brush  - Limit straining and forceful nose blowing  Outcome: Progressing     Problem: Impaired Activities of Daily Living  Goal: Achieve highest/safest level of independence in self care  Description: Interventions:  - Assess ability and encourage patient to participate in ADLs to maximize function  - Promote sitting position while performing ADLs such as feeding, grooming, and bathing  - Educate and encourage patient/family in tolerated functional activity level and precautions during self-care    Outcome: Progressing     No acute changes over night. Heparin drip titrated per protocol.

## 2023-03-03 LAB
APTT PPP: 50.7 SECONDS (ref 23.3–35.6)
APTT PPP: 90.2 SECONDS (ref 23.3–35.6)
DEPRECATED RDW RBC AUTO: 60.5 FL (ref 35.1–46.3)
ERYTHROCYTE [DISTWIDTH] IN BLOOD BY AUTOMATED COUNT: 25.1 % (ref 11–15)
HCT VFR BLD AUTO: 29 %
HGB BLD-MCNC: 8.4 G/DL
MCH RBC QN AUTO: 21.5 PG (ref 26–34)
MCHC RBC AUTO-ENTMCNC: 29 G/DL (ref 31–37)
MCV RBC AUTO: 74.4 FL
PLATELET # BLD AUTO: 245 10(3)UL (ref 150–450)
RBC # BLD AUTO: 3.9 X10(6)UL
WBC # BLD AUTO: 18.2 X10(3) UL (ref 4–11)

## 2023-03-03 PROCEDURE — 85730 THROMBOPLASTIN TIME PARTIAL: CPT | Performed by: STUDENT IN AN ORGANIZED HEALTH CARE EDUCATION/TRAINING PROGRAM

## 2023-03-03 PROCEDURE — 85027 COMPLETE CBC AUTOMATED: CPT | Performed by: INTERNAL MEDICINE

## 2023-03-03 PROCEDURE — 85730 THROMBOPLASTIN TIME PARTIAL: CPT | Performed by: INTERNAL MEDICINE

## 2023-03-03 RX ORDER — HEPARIN SODIUM 1000 [USP'U]/ML
30 INJECTION, SOLUTION INTRAVENOUS; SUBCUTANEOUS ONCE
Status: COMPLETED | OUTPATIENT
Start: 2023-03-03 | End: 2023-03-03

## 2023-03-03 RX ORDER — MELATONIN
325 2 TIMES DAILY WITH MEALS
Status: DISCONTINUED | OUTPATIENT
Start: 2023-03-03 | End: 2023-03-04

## 2023-03-03 NOTE — PLAN OF CARE
No acute changes at this time. Safety and fall precautions maintained, bed/chair alarms in place. Call light within reach. Frequent rounding by nursing staff. Problem: Patient Centered Care  Goal: Patient preferences are identified and integrated in the patient's plan of care  Description: Interventions:  - What would you like us to know as we care for you? I go dancing twice a week. From home alone. - Provide timely, complete, and accurate information to patient/family  - Incorporate patient and family knowledge, values, beliefs, and cultural backgrounds into the planning and delivery of care  - Encourage patient/family to participate in care and decision-making at the level they choose  - Honor patient and family perspectives and choices  Outcome: Progressing     Problem: Patient/Family Goals  Goal: Patient/Family Long Term Goal  Description: Patient's Long Term Goal: to go home    Interventions:  - monitor VS  - remote tele  - continous heparin drip  - pain management  - See additional Care Plan goals for specific interventions  Outcome: Progressing  Goal: Patient/Family Short Term Goal  Description: Patient's Short Term Goal: to feel better    Interventions:   - monitor VS  - remote tele  - continous heparin drip  - pain management  - See additional Care Plan goals for specific interventions  Outcome: Progressing     Problem: CARDIOVASCULAR - ADULT  Goal: Maintains optimal cardiac output and hemodynamic stability  Description: INTERVENTIONS:  - Monitor vital signs, rhythm, and trends  - Monitor for bleeding, hypotension and signs of decreased cardiac output  - Evaluate effectiveness of vasoactive medications to optimize hemodynamic stability  - Monitor arterial and/or venous puncture sites for bleeding and/or hematoma  - Assess quality of pulses, skin color and temperature  - Assess for signs of decreased coronary artery perfusion - ex.  Angina  - Evaluate fluid balance, assess for edema, trend weights  Outcome: Progressing  Goal: Absence of cardiac arrhythmias or at baseline  Description: INTERVENTIONS:  - Continuous cardiac monitoring, monitor vital signs, obtain 12 lead EKG if indicated  - Evaluate effectiveness of antiarrhythmic and heart rate control medications as ordered  - Initiate emergency measures for life threatening arrhythmias  - Monitor electrolytes and administer replacement therapy as ordered  Outcome: Progressing     Problem: METABOLIC/FLUID AND ELECTROLYTES - ADULT  Goal: Electrolytes maintained within normal limits  Description: INTERVENTIONS:  - Monitor labs and rhythm and assess patient for signs and symptoms of electrolyte imbalances  - Administer electrolyte replacement as ordered  - Monitor response to electrolyte replacements, including rhythm and repeat lab results as appropriate  - Fluid restriction as ordered  - Instruct patient on fluid and nutrition restrictions as appropriate  Outcome: Progressing  Goal: Hemodynamic stability and optimal renal function maintained  Description: INTERVENTIONS:  - Monitor labs and assess for signs and symptoms of volume excess or deficit  - Monitor intake, output and patient weight  - Monitor urine specific gravity, serum osmolarity and serum sodium as indicated or ordered  - Monitor response to interventions for patient's volume status, including labs, urine output, blood pressure (other measures as available)  - Encourage oral intake as appropriate  - Instruct patient on fluid and nutrition restrictions as appropriate  Outcome: Progressing     Problem: SKIN/TISSUE INTEGRITY - ADULT  Goal: Skin integrity remains intact  Description: INTERVENTIONS  - Assess and document risk factors for pressure ulcer development  - Assess and document skin integrity  - Monitor for areas of redness and/or skin breakdown  - Initiate interventions, skin care algorithm/standards of care as needed  Outcome: Progressing     Problem: HEMATOLOGIC - ADULT  Goal: Maintains hematologic stability  Description: INTERVENTIONS  - Assess for signs and symptoms of bleeding or hemorrhage  - Monitor labs and vital signs for trends  - Administer supportive blood products/factors, fluids and medications as ordered and appropriate  - Administer supportive blood products/factors as ordered and appropriate  Outcome: Progressing  Goal: Free from bleeding injury  Description: (Example usage: patient with low platelets)  INTERVENTIONS:  - Avoid intramuscular injections, enemas and rectal medication administration  - Ensure safe mobilization of patient  - Hold pressure on venipuncture sites to achieve adequate hemostasis  - Assess for signs and symptoms of internal bleeding  - Monitor lab trends  - Patient is to report abnormal signs of bleeding to staff  - Avoid use of toothpicks and dental floss  - Use electric shaver for shaving  - Use soft bristle tooth brush  - Limit straining and forceful nose blowing  Outcome: Progressing     Problem: Impaired Activities of Daily Living  Goal: Achieve highest/safest level of independence in self care  Description: Interventions:  - Assess ability and encourage patient to participate in ADLs to maximize function  - Promote sitting position while performing ADLs such as feeding, grooming, and bathing  - Educate and encourage patient/family in tolerated functional activity level and precautions during self-care    Outcome: Progressing

## 2023-03-03 NOTE — ANESTHESIA POSTPROCEDURE EVALUATION
Patient: Hellen Tomlinson    Procedure Summary     Date: 03/02/23 Room / Location: [facility] ENDOSCOPY 01 / [facility] ENDOSCOPY    Anesthesia Start: 1904 Anesthesia Stop: 1937    Procedure: ENDOSCOPIC ULTRASOUND (EUS) with Fine Needle Aspiration (FNA) Diagnosis: (Gastric Submucosal Lesion)    Surgeons: Darius Naqvi MD Anesthesiologist: Dong Bae MD    Anesthesia Type: general ASA Status: 4          Anesthesia Type: general    Vitals Value Taken Time   /76 03/02/23 1934   Temp  03/02/23 1937   Pulse 91 03/02/23 1936   Resp 23 03/02/23 1936   SpO2 95 % 03/02/23 1936   Vitals shown include unvalidated device data.     [facility] AN Post Evaluation:   Patient Evaluated in PACU  Patient Participation: complete - patient participated  Level of Consciousness: awake  Pain Management: adequate  Airway Patency:patent  Yes    Cardiovascular Status: acceptable  Respiratory Status: acceptable  Postoperative Hydration acceptable      Cash Chow MD  3/2/2023 7:37 PM

## 2023-03-03 NOTE — OPERATIVE REPORT
OPERATIVE REPORT   PATIENT NAME: Tana Santiago  MRN: [id number]  DATE OF OPERATION:3/2/2023  PREOPERATIVE DIAGNOSIS:   1. Gastric submucosal lesion  POSTOPERATIVE DIAGNOSES:  1. 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 They were given opportunity to ask questions all their questions were answered. Alternatives and options were discussed with them. An informed written witness consent was obtained  SPECIMEN: Gastric proximal body/fundus junction submucosal lesion  COMPLICATIONS: None immediately apparent  PROCEDURE AND FINDINGS:      After achieving adequate sedation and placing the patient in the left lateral decubitus position the Olympus linear echoendoscope was introduced under direct visualization in the esophagus passed into the stomach. The gastric submucosal lesion in the proximal body/fundus was visualized and sampled utilizing the 22-gauge [facility] [employer] fine-needle biopsy x2 needle pass. Doppler was utilized to ensure that there are no interposing blood vessels in the needle track. The lesion appeared to be hypoechoic measuring approximately 4.0 x 2.0 cm with calcifications and areas of cystic degeneration. No active bleeding or obvious perforation seen. The scope was then removed after suction the gastric content      IMPRESSION:  1. Findings as described above  RECOMMENDATIONS:  1. Await final biopsy results. 2. Resume heparin drip after 6 hours (2 AM on March 3, 2023) and monitor for signs of bleeding  3.  Clear liquid diet then advance as tolerated  Rosette Kumar MD

## 2023-03-03 NOTE — PLAN OF CARE
Received Pt from Endoscopic Ultrasound in stable condition. Tolerated all scheduled medications, no complications noted. PRN Tramadol given for c/o lower back pain from pt. Continuous telemetry monitoring in place. Currently, on Clear liquid diet, advance as tolerated. D5% & 0.45 NS infusing continuously at 83 ml/hr. Per GI, resume IV Heparin drip at 2 AM. Frequent rounding on pt. Fall & safety precautions in place for pt. Call light placed within pt.'s reach at all times. Will continue to monitor for any new acute changes. Problem: Patient Centered Care  Goal: Patient preferences are identified and integrated in the patient's plan of care  Description: Interventions:  - What would you like us to know as we care for you? I go dancing twice a week. From home alone.   - Provide timely, complete, and accurate information to patient/family  - Incorporate patient and family knowledge, values, beliefs, and cultural backgrounds into the planning and delivery of care  - Encourage patient/family to participate in care and decision-making at the level they choose  - Honor patient and family perspectives and choices  Outcome: Progressing     Problem: Patient/Family Goals  Goal: Patient/Family Long Term Goal  Description: Patient's Long Term Goal: to go home    Interventions:  - monitor VS  - remote tele  - continous heparin drip  - pain management  - See additional Care Plan goals for specific interventions  Outcome: Progressing  Goal: Patient/Family Short Term Goal  Description: Patient's Short Term Goal: to feel better    Interventions:   - monitor VS  - remote tele  - continous heparin drip  - pain management  - See additional Care Plan goals for specific interventions  Outcome: Progressing     Problem: CARDIOVASCULAR - ADULT  Goal: Maintains optimal cardiac output and hemodynamic stability  Description: INTERVENTIONS:  - Monitor vital signs, rhythm, and trends  - Monitor for bleeding, hypotension and signs of decreased cardiac output  - Evaluate effectiveness of vasoactive medications to optimize hemodynamic stability  - Monitor arterial and/or venous puncture sites for bleeding and/or hematoma  - Assess quality of pulses, skin color and temperature  - Assess for signs of decreased coronary artery perfusion - ex.  Angina  - Evaluate fluid balance, assess for edema, trend weights  Outcome: Progressing  Goal: Absence of cardiac arrhythmias or at baseline  Description: INTERVENTIONS:  - Continuous cardiac monitoring, monitor vital signs, obtain 12 lead EKG if indicated  - Evaluate effectiveness of antiarrhythmic and heart rate control medications as ordered  - Initiate emergency measures for life threatening arrhythmias  - Monitor electrolytes and administer replacement therapy as ordered  Outcome: Progressing     Problem: METABOLIC/FLUID AND ELECTROLYTES - ADULT  Goal: Electrolytes maintained within normal limits  Description: INTERVENTIONS:  - Monitor labs and rhythm and assess patient for signs and symptoms of electrolyte imbalances  - Administer electrolyte replacement as ordered  - Monitor response to electrolyte replacements, including rhythm and repeat lab results as appropriate  - Fluid restriction as ordered  - Instruct patient on fluid and nutrition restrictions as appropriate  Outcome: Progressing  Goal: Hemodynamic stability and optimal renal function maintained  Description: INTERVENTIONS:  - Monitor labs and assess for signs and symptoms of volume excess or deficit  - Monitor intake, output and patient weight  - Monitor urine specific gravity, serum osmolarity and serum sodium as indicated or ordered  - Monitor response to interventions for patient's volume status, including labs, urine output, blood pressure (other measures as available)  - Encourage oral intake as appropriate  - Instruct patient on fluid and nutrition restrictions as appropriate  Outcome: Progressing     Problem: SKIN/TISSUE INTEGRITY - ADULT  Goal: Skin integrity remains intact  Description: INTERVENTIONS  - Assess and document risk factors for pressure ulcer development  - Assess and document skin integrity  - Monitor for areas of redness and/or skin breakdown  - Initiate interventions, skin care algorithm/standards of care as needed  Outcome: Progressing     Problem: HEMATOLOGIC - ADULT  Goal: Maintains hematologic stability  Description: INTERVENTIONS  - Assess for signs and symptoms of bleeding or hemorrhage  - Monitor labs and vital signs for trends  - Administer supportive blood products/factors, fluids and medications as ordered and appropriate  - Administer supportive blood products/factors as ordered and appropriate  Outcome: Progressing  Goal: Free from bleeding injury  Description: (Example usage: patient with low platelets)  INTERVENTIONS:  - Avoid intramuscular injections, enemas and rectal medication administration  - Ensure safe mobilization of patient  - Hold pressure on venipuncture sites to achieve adequate hemostasis  - Assess for signs and symptoms of internal bleeding  - Monitor lab trends  - Patient is to report abnormal signs of bleeding to staff  - Avoid use of toothpicks and dental floss  - Use electric shaver for shaving  - Use soft bristle tooth brush  - Limit straining and forceful nose blowing  Outcome: Progressing     Problem: Impaired Activities of Daily Living  Goal: Achieve highest/safest level of independence in self care  Description: Interventions:  - Assess ability and encourage patient to participate in ADLs to maximize function  - Promote sitting position while performing ADLs such as feeding, grooming, and bathing  - Educate and encourage patient/family in tolerated functional activity level and precautions during self-care    Outcome: Progressing

## 2023-03-04 VITALS
OXYGEN SATURATION: 97 % | WEIGHT: 99 LBS | HEART RATE: 87 BPM | TEMPERATURE: 98 F | RESPIRATION RATE: 16 BRPM | HEIGHT: 60 IN | DIASTOLIC BLOOD PRESSURE: 63 MMHG | BODY MASS INDEX: 19.44 KG/M2 | SYSTOLIC BLOOD PRESSURE: 156 MMHG

## 2023-03-04 LAB
ANION GAP SERPL CALC-SCNC: 5 MMOL/L (ref 0–18)
BUN BLD-MCNC: 14 MG/DL (ref 7–18)
BUN/CREAT SERPL: 23 (ref 10–20)
CALCIUM BLD-MCNC: 9.6 MG/DL (ref 8.5–10.1)
CHLORIDE SERPL-SCNC: 105 MMOL/L (ref 98–112)
CO2 SERPL-SCNC: 29 MMOL/L (ref 21–32)
CREAT BLD-MCNC: 0.61 MG/DL
DEPRECATED RDW RBC AUTO: 66.7 FL (ref 35.1–46.3)
ERYTHROCYTE [DISTWIDTH] IN BLOOD BY AUTOMATED COUNT: 26 % (ref 11–15)
GFR SERPLBLD BASED ON 1.73 SQ M-ARVRAT: 84 ML/MIN/1.73M2 (ref 60–?)
GLUCOSE BLD-MCNC: 102 MG/DL (ref 70–99)
HCT VFR BLD AUTO: 32.3 %
HGB BLD-MCNC: 9.4 G/DL
MCH RBC QN AUTO: 22.1 PG (ref 26–34)
MCHC RBC AUTO-ENTMCNC: 29.1 G/DL (ref 31–37)
MCV RBC AUTO: 76 FL
OSMOLALITY SERPL CALC.SUM OF ELEC: 289 MOSM/KG (ref 275–295)
PLATELET # BLD AUTO: 247 10(3)UL (ref 150–450)
POTASSIUM SERPL-SCNC: 3.3 MMOL/L (ref 3.5–5.1)
RBC # BLD AUTO: 4.25 X10(6)UL
SODIUM SERPL-SCNC: 139 MMOL/L (ref 136–145)
WBC # BLD AUTO: 9.1 X10(3) UL (ref 4–11)

## 2023-03-04 PROCEDURE — 80048 BASIC METABOLIC PNL TOTAL CA: CPT | Performed by: INTERNAL MEDICINE

## 2023-03-04 PROCEDURE — 85027 COMPLETE CBC AUTOMATED: CPT | Performed by: INTERNAL MEDICINE

## 2023-03-04 RX ORDER — POTASSIUM CHLORIDE 20 MEQ/1
40 TABLET, EXTENDED RELEASE ORAL ONCE
Status: COMPLETED | OUTPATIENT
Start: 2023-03-04 | End: 2023-03-04

## 2023-03-04 RX ORDER — MELATONIN
325 2 TIMES DAILY WITH MEALS
Qty: 60 TABLET | Refills: 0 | Status: SHIPPED | OUTPATIENT
Start: 2023-03-04

## 2023-03-04 RX ORDER — DOCUSATE SODIUM 100 MG/1
100 CAPSULE, LIQUID FILLED ORAL 2 TIMES DAILY
Qty: 30 CAPSULE | Refills: 0 | Status: SHIPPED | OUTPATIENT
Start: 2023-03-04

## 2023-03-04 RX ORDER — PANTOPRAZOLE SODIUM 40 MG/1
40 TABLET, DELAYED RELEASE ORAL
Qty: 30 TABLET | Refills: 0 | Status: SHIPPED | OUTPATIENT
Start: 2023-03-05

## 2023-03-04 NOTE — DISCHARGE INSTRUCTIONS
You will need to take your iron supplement to help improve your anemia  , this can cause dark or black stools and constipation. I suggest taking a stool softener to prevent constipation if needed one has been prescribed for you.    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

## 2023-03-04 NOTE — PLAN OF CARE
Problem: Patient/Family Goals  Goal: Patient/Family Long Term Goal  Description: Patient's Long Term Goal: to go home    Interventions:  - monitor VS  - remote tele  - continous heparin drip  - pain management  - See additional Care Plan goals for specific interventions  Outcome: Progressing     Problem: CARDIOVASCULAR - ADULT  Goal: Maintains optimal cardiac output and hemodynamic stability  Description: INTERVENTIONS:  - Monitor vital signs, rhythm, and trends  - Monitor for bleeding, hypotension and signs of decreased cardiac output  - Evaluate effectiveness of vasoactive medications to optimize hemodynamic stability  - Monitor arterial and/or venous puncture sites for bleeding and/or hematoma  - Assess quality of pulses, skin color and temperature  - Assess for signs of decreased coronary artery perfusion - ex. Angina  - Evaluate fluid balance, assess for edema, trend weights  Outcome: Progressing     Problem: CARDIOVASCULAR - ADULT  Goal: Maintains optimal cardiac output and hemodynamic stability  Description: INTERVENTIONS:  - Monitor vital signs, rhythm, and trends  - Monitor for bleeding, hypotension and signs of decreased cardiac output  - Evaluate effectiveness of vasoactive medications to optimize hemodynamic stability  - Monitor arterial and/or venous puncture sites for bleeding and/or hematoma  - Assess quality of pulses, skin color and temperature  - Assess for signs of decreased coronary artery perfusion - ex. Angina  - Evaluate fluid balance, assess for edema, trend weights  Outcome: Progressing  Patient a/o X4, VSS, due meds administered as ordered. Patient discharged to home as per orders. Daughter provided transportation. DC summary and follow ups reviewed with patient and daughter. Saline lock and tele removed prior to DC.

## 2023-03-04 NOTE — PLAN OF CARE
Received Pt in stable condition. Continuous telemetry monitoring in place. Currently, advanced to Low Fiber, Soft Diet. IV Fluids discontinued. IV Heparin gtt discontinued, pt bridged over to Eliquis 2.5 mg BID. Frequent rounding on pt. Fall & safety precautions in place for pt. Pt to F/U with Cardiologist within 2-3 weeks. Call light placed within pt.'s reach at all times. Will continue to monitor for any new acute changes. Problem: Patient Centered Care  Goal: Patient preferences are identified and integrated in the patient's plan of care  Description: Interventions:  - What would you like us to know as we care for you? I go dancing twice a week. From home alone.   - Provide timely, complete, and accurate information to patient/family  - Incorporate patient and family knowledge, values, beliefs, and cultural backgrounds into the planning and delivery of care  - Encourage patient/family to participate in care and decision-making at the level they choose  - Honor patient and family perspectives and choices  Outcome: Progressing     Problem: Patient/Family Goals  Goal: Patient/Family Long Term Goal  Description: Patient's Long Term Goal: to go home    Interventions:  - monitor VS  - remote tele  - continous heparin drip  - pain management  - See additional Care Plan goals for specific interventions  Outcome: Progressing  Goal: Patient/Family Short Term Goal  Description: Patient's Short Term Goal: to feel better    Interventions:   - monitor VS  - remote tele  - continous heparin drip  - pain management  - See additional Care Plan goals for specific interventions  Outcome: Progressing     Problem: CARDIOVASCULAR - ADULT  Goal: Maintains optimal cardiac output and hemodynamic stability  Description: INTERVENTIONS:  - Monitor vital signs, rhythm, and trends  - Monitor for bleeding, hypotension and signs of decreased cardiac output  - Evaluate effectiveness of vasoactive medications to optimize hemodynamic stability  - Monitor arterial and/or venous puncture sites for bleeding and/or hematoma  - Assess quality of pulses, skin color and temperature  - Assess for signs of decreased coronary artery perfusion - ex.  Angina  - Evaluate fluid balance, assess for edema, trend weights  Outcome: Progressing  Goal: Absence of cardiac arrhythmias or at baseline  Description: INTERVENTIONS:  - Continuous cardiac monitoring, monitor vital signs, obtain 12 lead EKG if indicated  - Evaluate effectiveness of antiarrhythmic and heart rate control medications as ordered  - Initiate emergency measures for life threatening arrhythmias  - Monitor electrolytes and administer replacement therapy as ordered  Outcome: Progressing     Problem: METABOLIC/FLUID AND ELECTROLYTES - ADULT  Goal: Electrolytes maintained within normal limits  Description: INTERVENTIONS:  - Monitor labs and rhythm and assess patient for signs and symptoms of electrolyte imbalances  - Administer electrolyte replacement as ordered  - Monitor response to electrolyte replacements, including rhythm and repeat lab results as appropriate  - Fluid restriction as ordered  - Instruct patient on fluid and nutrition restrictions as appropriate  Outcome: Progressing  Goal: Hemodynamic stability and optimal renal function maintained  Description: INTERVENTIONS:  - Monitor labs and assess for signs and symptoms of volume excess or deficit  - Monitor intake, output and patient weight  - Monitor urine specific gravity, serum osmolarity and serum sodium as indicated or ordered  - Monitor response to interventions for patient's volume status, including labs, urine output, blood pressure (other measures as available)  - Encourage oral intake as appropriate  - Instruct patient on fluid and nutrition restrictions as appropriate  Outcome: Progressing     Problem: SKIN/TISSUE INTEGRITY - ADULT  Goal: Skin integrity remains intact  Description: INTERVENTIONS  - Assess and document risk factors for pressure ulcer development  - Assess and document skin integrity  - Monitor for areas of redness and/or skin breakdown  - Initiate interventions, skin care algorithm/standards of care as needed  Outcome: Progressing     Problem: HEMATOLOGIC - ADULT  Goal: Maintains hematologic stability  Description: INTERVENTIONS  - Assess for signs and symptoms of bleeding or hemorrhage  - Monitor labs and vital signs for trends  - Administer supportive blood products/factors, fluids and medications as ordered and appropriate  - Administer supportive blood products/factors as ordered and appropriate  Outcome: Progressing  Goal: Free from bleeding injury  Description: (Example usage: patient with low platelets)  INTERVENTIONS:  - Avoid intramuscular injections, enemas and rectal medication administration  - Ensure safe mobilization of patient  - Hold pressure on venipuncture sites to achieve adequate hemostasis  - Assess for signs and symptoms of internal bleeding  - Monitor lab trends  - Patient is to report abnormal signs of bleeding to staff  - Avoid use of toothpicks and dental floss  - Use electric shaver for shaving  - Use soft bristle tooth brush  - Limit straining and forceful nose blowing  Outcome: Progressing     Problem: Impaired Activities of Daily Living  Goal: Achieve highest/safest level of independence in self care  Description: Interventions:  - Assess ability and encourage patient to participate in ADLs to maximize function  - Promote sitting position while performing ADLs such as feeding, grooming, and bathing  - Educate and encourage patient/family in tolerated functional activity level and precautions during self-care    Outcome: Progressing

## 2023-03-05 ENCOUNTER — HOSPITAL ENCOUNTER (OUTPATIENT)
Age: 88
Discharge: HOME | End: 2023-03-05
Payer: MEDICARE

## 2023-03-05 VITALS
TEMPERATURE: 99 F | DIASTOLIC BLOOD PRESSURE: 58 MMHG | SYSTOLIC BLOOD PRESSURE: 160 MMHG | OXYGEN SATURATION: 97 % | HEART RATE: 90 BPM | RESPIRATION RATE: 20 BRPM

## 2023-03-05 DIAGNOSIS — L03.113 CELLULITIS OF RIGHT UPPER EXTREMITY: Primary | ICD-10-CM

## 2023-03-05 PROCEDURE — 99214 OFFICE O/P EST MOD 30 MIN: CPT

## 2023-03-05 PROCEDURE — 99213 OFFICE O/P EST LOW 20 MIN: CPT

## 2023-03-05 RX ORDER — SULFAMETHOXAZOLE AND TRIMETHOPRIM 800; 160 MG/1; MG/1
1 TABLET ORAL 2 TIMES DAILY
Qty: 20 TABLET | Refills: 0 | Status: SHIPPED | OUTPATIENT
Start: 2023-03-05 | End: 2023-03-15

## 2023-03-05 NOTE — ED INITIAL ASSESSMENT (HPI)
Presents with pain, redness, and swelling to right forearm. Released from hospital yesterday post DVT. Several IV insertions to RUE while hospitalized. No fever.

## 2023-03-07 ENCOUNTER — HOSPITAL ENCOUNTER (OUTPATIENT)
Age: 88
Discharge: HOME | End: 2023-03-07
Attending: EMERGENCY MEDICINE
Payer: MEDICARE

## 2023-03-07 VITALS
TEMPERATURE: 100 F | SYSTOLIC BLOOD PRESSURE: 136 MMHG | OXYGEN SATURATION: 100 % | DIASTOLIC BLOOD PRESSURE: 63 MMHG | RESPIRATION RATE: 18 BRPM | HEART RATE: 86 BPM

## 2023-03-07 DIAGNOSIS — R53.1 WEAKNESS: Primary | ICD-10-CM

## 2023-03-07 DIAGNOSIS — E87.1 HYPONATREMIA: ICD-10-CM

## 2023-03-07 LAB
#MXD IC: 0.4 X10ˆ3/UL (ref 0.1–1)
BUN BLD-MCNC: 16 MG/DL (ref 7–18)
CHLORIDE BLD-SCNC: 100 MMOL/L (ref 98–112)
CO2 BLD-SCNC: 24 MMOL/L (ref 21–32)
CREAT BLD-MCNC: 0.5 MG/DL
GFR SERPLBLD BASED ON 1.73 SQ M-ARVRAT: 88 ML/MIN/1.73M2 (ref 60–?)
GLUCOSE BLD-MCNC: 135 MG/DL (ref 70–99)
HCT VFR BLD AUTO: 34.8 %
HCT VFR BLD CALC: 35 %
HGB BLD-MCNC: 10 G/DL
ISTAT IONIZED CALCIUM FOR CHEM 8: 1.2 MMOL/L (ref 1.12–1.32)
LYMPHOCYTES # BLD AUTO: 0.3 X10ˆ3/UL (ref 1–4)
LYMPHOCYTES NFR BLD AUTO: 2.4 %
MCH RBC QN AUTO: 22.4 PG (ref 26–34)
MCHC RBC AUTO-ENTMCNC: 28.7 G/DL (ref 31–37)
MCV RBC AUTO: 77.9 FL (ref 80–100)
MIXED CELL %: 3.6 %
NEUTROPHILS # BLD AUTO: 9.8 X10ˆ3/UL (ref 1.5–7.7)
NEUTROPHILS NFR BLD AUTO: 94 %
PLATELET # BLD AUTO: 233 X10ˆ3/UL (ref 150–450)
POTASSIUM BLD-SCNC: 4.2 MMOL/L (ref 3.6–5.1)
RBC # BLD AUTO: 4.47 X10ˆ6/UL
SODIUM BLD-SCNC: 132 MMOL/L (ref 136–145)
WBC # BLD AUTO: 10.5 X10ˆ3/UL (ref 4–11)

## 2023-03-07 PROCEDURE — 85025 COMPLETE CBC W/AUTO DIFF WBC: CPT | Performed by: EMERGENCY MEDICINE

## 2023-03-07 PROCEDURE — 99213 OFFICE O/P EST LOW 20 MIN: CPT

## 2023-03-07 PROCEDURE — 80047 BASIC METABLC PNL IONIZED CA: CPT

## 2023-03-07 PROCEDURE — 36415 COLL VENOUS BLD VENIPUNCTURE: CPT

## 2023-03-07 NOTE — ED INITIAL ASSESSMENT (HPI)
PATIENT ARRIVED AMBULATORY TO ROOM WITH DAUGHTER. +GENERALIZED WEAKNESS STARTED TODAY. NO COUGH. NO NASAL CONGESTION. NO FEVERS. NO N/V/D. EASY NON LABORED RESPIRATIONS.

## 2023-04-21 ENCOUNTER — OFFICE VISIT (OUTPATIENT)
Dept: OTOLARYNGOLOGY | Facility: CLINIC | Age: 88
End: 2023-04-21

## 2023-04-21 VITALS — BODY MASS INDEX: 19.44 KG/M2 | HEIGHT: 60 IN | WEIGHT: 99 LBS

## 2023-04-21 DIAGNOSIS — H61.23 BILATERAL IMPACTED CERUMEN: Primary | ICD-10-CM

## 2023-06-09 ENCOUNTER — PREP FOR CASE (OUTPATIENT)
Dept: CARDIOLOGY | Age: 88
End: 2023-06-09

## 2023-06-09 DIAGNOSIS — I35.0 NONRHEUMATIC AORTIC VALVE STENOSIS: Primary | ICD-10-CM

## 2023-06-20 RX ORDER — ACETAMINOPHEN 500 MG
500 TABLET ORAL 2 TIMES DAILY PRN
COMMUNITY

## 2023-06-20 RX ORDER — PANTOPRAZOLE SODIUM 40 MG/1
40 TABLET, DELAYED RELEASE ORAL DAILY
COMMUNITY

## 2023-06-20 RX ORDER — FERROUS SULFATE 325(65) MG
325 TABLET ORAL 2 TIMES DAILY WITH MEALS
COMMUNITY

## 2023-06-20 RX ORDER — FUROSEMIDE 20 MG/1
20 TABLET ORAL DAILY
COMMUNITY

## 2023-06-20 RX ORDER — TIZANIDINE 4 MG/1
4 TABLET ORAL EVERY 8 HOURS PRN
COMMUNITY

## 2023-06-20 RX ORDER — TRAMADOL HYDROCHLORIDE 50 MG/1
50 TABLET ORAL 2 TIMES DAILY
COMMUNITY

## 2023-06-21 ENCOUNTER — HOSPITAL ENCOUNTER (OUTPATIENT)
Age: 88
Discharge: HOME | End: 2023-06-21
Attending: INTERNAL MEDICINE | Admitting: INTERNAL MEDICINE

## 2023-06-21 VITALS
TEMPERATURE: 98.2 F | HEART RATE: 74 BPM | RESPIRATION RATE: 16 BRPM | OXYGEN SATURATION: 98 % | DIASTOLIC BLOOD PRESSURE: 80 MMHG | SYSTOLIC BLOOD PRESSURE: 157 MMHG | WEIGHT: 103.62 LBS | BODY MASS INDEX: 20.34 KG/M2 | HEIGHT: 60 IN

## 2023-06-21 DIAGNOSIS — I35.0 NONRHEUMATIC AORTIC VALVE STENOSIS: ICD-10-CM

## 2023-06-21 LAB
HCT VFR BLD CALC: 33 % (ref 36–46.5)
HCT VFR BLD CALC: 33 % (ref 36–46.5)

## 2023-06-21 PROCEDURE — 10002800 HB RX 250 W HCPCS: Performed by: INTERNAL MEDICINE

## 2023-06-21 PROCEDURE — 10006027 HB SUPPLY 278: Performed by: INTERNAL MEDICINE

## 2023-06-21 PROCEDURE — 10002807 HB RX 258: Performed by: INTERNAL MEDICINE

## 2023-06-21 PROCEDURE — C1751 CATH, INF, PER/CENT/MIDLINE: HCPCS | Performed by: INTERNAL MEDICINE

## 2023-06-21 PROCEDURE — 10002805 HB CONTRAST AGENT: Performed by: INTERNAL MEDICINE

## 2023-06-21 PROCEDURE — 10004651 HB RX, NO CHARGE ITEM: Performed by: INTERNAL MEDICINE

## 2023-06-21 PROCEDURE — 99153 MOD SED SAME PHYS/QHP EA: CPT | Performed by: INTERNAL MEDICINE

## 2023-06-21 PROCEDURE — C1769 GUIDE WIRE: HCPCS | Performed by: INTERNAL MEDICINE

## 2023-06-21 PROCEDURE — 10006023 HB SUPPLY 272: Performed by: INTERNAL MEDICINE

## 2023-06-21 PROCEDURE — 75625 CONTRAST EXAM ABDOMINL AORTA: CPT | Performed by: INTERNAL MEDICINE

## 2023-06-21 PROCEDURE — 13000001 HB PHASE II RECOVERY EA 30 MINUTES: Performed by: INTERNAL MEDICINE

## 2023-06-21 PROCEDURE — C1760 CLOSURE DEV, VASC: HCPCS | Performed by: INTERNAL MEDICINE

## 2023-06-21 PROCEDURE — C1894 INTRO/SHEATH, NON-LASER: HCPCS | Performed by: INTERNAL MEDICINE

## 2023-06-21 PROCEDURE — 36246 INS CATH ABD/L-EXT ART 2ND: CPT | Performed by: INTERNAL MEDICINE

## 2023-06-21 PROCEDURE — 10002801 HB RX 250 W/O HCPCS: Performed by: INTERNAL MEDICINE

## 2023-06-21 PROCEDURE — 99152 MOD SED SAME PHYS/QHP 5/>YRS: CPT | Performed by: INTERNAL MEDICINE

## 2023-06-21 PROCEDURE — 93456 R HRT CORONARY ARTERY ANGIO: CPT | Performed by: INTERNAL MEDICINE

## 2023-06-21 DEVICE — ANGIO-SEAL VIP VASCULAR CLOSURE DEVICE
Type: IMPLANTABLE DEVICE | Site: FEMORAL ARTERY | Status: FUNCTIONAL
Brand: ANGIO-SEAL

## 2023-06-21 RX ORDER — HYDRALAZINE HYDROCHLORIDE 20 MG/ML
10 INJECTION INTRAMUSCULAR; INTRAVENOUS EVERY 4 HOURS PRN
Status: DISCONTINUED | OUTPATIENT
Start: 2023-06-21 | End: 2023-06-21 | Stop reason: HOSPADM

## 2023-06-21 RX ORDER — TRAMADOL HYDROCHLORIDE 50 MG/1
50 TABLET ORAL EVERY 6 HOURS PRN
Status: DISCONTINUED | OUTPATIENT
Start: 2023-06-21 | End: 2023-06-21 | Stop reason: HOSPADM

## 2023-06-21 RX ORDER — ASPIRIN 81 MG/1
81 TABLET, CHEWABLE ORAL DAILY
Status: DISCONTINUED | OUTPATIENT
Start: 2023-06-22 | End: 2023-06-21 | Stop reason: HOSPADM

## 2023-06-21 RX ORDER — 0.9 % SODIUM CHLORIDE 0.9 %
2 VIAL (ML) INJECTION EVERY 12 HOURS SCHEDULED
Status: DISCONTINUED | OUTPATIENT
Start: 2023-06-21 | End: 2023-06-21 | Stop reason: HOSPADM

## 2023-06-21 RX ORDER — ACETAMINOPHEN 325 MG/1
650 TABLET ORAL EVERY 4 HOURS PRN
Status: DISCONTINUED | OUTPATIENT
Start: 2023-06-21 | End: 2023-06-21 | Stop reason: HOSPADM

## 2023-06-21 RX ORDER — MIDAZOLAM HYDROCHLORIDE 1 MG/ML
INJECTION, SOLUTION INTRAMUSCULAR; INTRAVENOUS PRN
Status: DISCONTINUED | OUTPATIENT
Start: 2023-06-21 | End: 2023-06-21 | Stop reason: HOSPADM

## 2023-06-21 RX ORDER — NITROGLYCERIN 0.4 MG/1
0.4 TABLET SUBLINGUAL EVERY 5 MIN PRN
Status: DISCONTINUED | OUTPATIENT
Start: 2023-06-21 | End: 2023-06-21 | Stop reason: HOSPADM

## 2023-06-21 RX ORDER — SODIUM CHLORIDE 9 MG/ML
INJECTION, SOLUTION INTRAVENOUS CONTINUOUS
Status: ACTIVE | OUTPATIENT
Start: 2023-06-21 | End: 2023-06-21

## 2023-06-21 RX ORDER — CLONIDINE HYDROCHLORIDE 0.1 MG/1
0.1 TABLET ORAL EVERY 4 HOURS PRN
Status: DISCONTINUED | OUTPATIENT
Start: 2023-06-21 | End: 2023-06-21 | Stop reason: HOSPADM

## 2023-06-21 RX ORDER — VERAPAMIL HYDROCHLORIDE 2.5 MG/ML
INJECTION, SOLUTION INTRAVENOUS PRN
Status: DISCONTINUED | OUTPATIENT
Start: 2023-06-21 | End: 2023-06-21 | Stop reason: HOSPADM

## 2023-06-21 RX ORDER — ACETAMINOPHEN 650 MG/1
650 SUPPOSITORY RECTAL EVERY 4 HOURS PRN
Status: DISCONTINUED | OUTPATIENT
Start: 2023-06-21 | End: 2023-06-21 | Stop reason: HOSPADM

## 2023-06-21 RX ORDER — LIDOCAINE HYDROCHLORIDE 20 MG/ML
INJECTION, SOLUTION EPIDURAL; INFILTRATION; INTRACAUDAL; PERINEURAL PRN
Status: DISCONTINUED | OUTPATIENT
Start: 2023-06-21 | End: 2023-06-21 | Stop reason: HOSPADM

## 2023-06-21 RX ORDER — ASPIRIN 81 MG/1
81 TABLET, CHEWABLE ORAL DAILY
Qty: 30 TABLET | Refills: 0 | Status: SHIPPED | OUTPATIENT
Start: 2023-06-22

## 2023-06-21 RX ORDER — ASPIRIN 325 MG
325 TABLET ORAL ONCE
Status: DISCONTINUED | OUTPATIENT
Start: 2023-06-21 | End: 2023-06-21 | Stop reason: HOSPADM

## 2023-06-21 RX ADMIN — SODIUM CHLORIDE: 9 INJECTION, SOLUTION INTRAVENOUS at 12:21

## 2023-06-21 RX ADMIN — SODIUM CHLORIDE 2 ML: 9 INJECTION, SOLUTION INTRAMUSCULAR; INTRAVENOUS; SUBCUTANEOUS at 09:00

## 2023-06-21 ASSESSMENT — PAIN SCALES - GENERAL
PAINLEVEL_OUTOF10: 0
PAINLEVEL_OUTOF10: 4
PAINLEVEL_OUTOF10: 0
PAINLEVEL_OUTOF10: 2
PAINLEVEL_OUTOF10: 5

## 2023-06-28 ENCOUNTER — HOSPITAL ENCOUNTER (OUTPATIENT)
Dept: CT IMAGING | Age: 88
Discharge: HOME | End: 2023-06-28

## 2023-06-28 ENCOUNTER — OFFICE VISIT (OUTPATIENT)
Dept: CARDIOLOGY | Age: 88
End: 2023-06-28
Attending: INTERNAL MEDICINE

## 2023-06-28 ENCOUNTER — HOSPITAL ENCOUNTER (OUTPATIENT)
Dept: ULTRASOUND IMAGING | Age: 88
Discharge: HOME | End: 2023-06-28

## 2023-06-28 ENCOUNTER — HOSPITAL ENCOUNTER (OUTPATIENT)
Dept: RESPIRATORY THERAPY | Age: 88
Discharge: HOME | End: 2023-06-28

## 2023-06-28 VITALS — DIASTOLIC BLOOD PRESSURE: 73 MMHG | HEART RATE: 78 BPM | SYSTOLIC BLOOD PRESSURE: 145 MMHG

## 2023-06-28 VITALS — RESPIRATION RATE: 16 BRPM | HEIGHT: 60 IN | BODY MASS INDEX: 20.26 KG/M2 | WEIGHT: 103.17 LBS

## 2023-06-28 DIAGNOSIS — I35.0 SEVERE AORTIC STENOSIS: Primary | ICD-10-CM

## 2023-06-28 DIAGNOSIS — R09.89 OTHER SPECIFIED SYMPTOMS AND SIGNS INVOLVING THE CIRCULATORY AND RESPIRATORY SYSTEMS: ICD-10-CM

## 2023-06-28 DIAGNOSIS — Z01.810 PRE-OPERATIVE CARDIOVASCULAR EXAMINATION: ICD-10-CM

## 2023-06-28 DIAGNOSIS — I35.0 SEVERE AORTIC STENOSIS: ICD-10-CM

## 2023-06-28 LAB
ABO + RH BLD: NORMAL
ALBUMIN SERPL-MCNC: 3 G/DL (ref 3.6–5.1)
ALBUMIN/GLOB SERPL: 1 {RATIO} (ref 1–2.4)
ALP SERPL-CCNC: 85 UNITS/L (ref 45–117)
ALT SERPL-CCNC: 21 UNITS/L
ANION GAP SERPL CALC-SCNC: 7 MMOL/L (ref 7–19)
AST SERPL-CCNC: 21 UNITS/L
ATRIAL RATE (BPM): 79
BASOPHILS # BLD: 0.1 K/MCL (ref 0–0.3)
BASOPHILS NFR BLD: 1 %
BILIRUB SERPL-MCNC: <0.1 MG/DL (ref 0.2–1)
BLD GP AB SCN SERPL QL GEL: NEGATIVE
BUN SERPL-MCNC: 35 MG/DL (ref 6–20)
BUN/CREAT SERPL: 88 (ref 7–25)
CALCIUM SERPL-MCNC: 9.3 MG/DL (ref 8.4–10.2)
CHLORIDE SERPL-SCNC: 104 MMOL/L (ref 97–110)
CO2 SERPL-SCNC: 31 MMOL/L (ref 21–32)
CREAT SERPL-MCNC: 0.4 MG/DL (ref 0.51–0.95)
DEPRECATED RDW RBC: 42 FL (ref 39–50)
EOSINOPHIL # BLD: 0.2 K/MCL (ref 0–0.5)
EOSINOPHIL NFR BLD: 2 %
ERYTHROCYTE [DISTWIDTH] IN BLOOD: 11.9 % (ref 11–15)
FASTING DURATION TIME PATIENT: ABNORMAL H
GFR SERPLBLD BASED ON 1.73 SQ M-ARVRAT: >90 ML/MIN
GLOBULIN SER-MCNC: 3 G/DL (ref 2–4)
GLUCOSE SERPL-MCNC: 130 MG/DL (ref 70–99)
HCT VFR BLD CALC: 25.3 % (ref 36–46.5)
HGB BLD-MCNC: 7.5 G/DL (ref 12–15.5)
IMM GRANULOCYTES # BLD AUTO: 0 K/MCL (ref 0–0.2)
IMM GRANULOCYTES # BLD: 0 %
LYMPHOCYTES # BLD: 1 K/MCL (ref 1–4)
LYMPHOCYTES NFR BLD: 16 %
MCH RBC QN AUTO: 29.1 PG (ref 26–34)
MCHC RBC AUTO-ENTMCNC: 29.6 G/DL (ref 32–36.5)
MCV RBC AUTO: 98.1 FL (ref 78–100)
MONOCYTES # BLD: 0.6 K/MCL (ref 0.3–0.9)
MONOCYTES NFR BLD: 9 %
NEUTROPHILS # BLD: 4.5 K/MCL (ref 1.8–7.7)
NEUTROPHILS NFR BLD: 72 %
NRBC BLD MANUAL-RTO: 0 /100 WBC
P AXIS (DEGREES): 80
PLATELET # BLD AUTO: 241 K/MCL (ref 140–450)
POTASSIUM SERPL-SCNC: 3.5 MMOL/L (ref 3.4–5.1)
PR-INTERVAL (MSEC): 190
PROT SERPL-MCNC: 6 G/DL (ref 6.4–8.2)
QRS-INTERVAL (MSEC): 110
QT-INTERVAL (MSEC): 396
QTC: 454
R AXIS (DEGREES): 85
RBC # BLD: 2.58 MIL/MCL (ref 4–5.2)
REPORT TEXT: NORMAL
SODIUM SERPL-SCNC: 138 MMOL/L (ref 135–145)
T AXIS (DEGREES): 91
TYPE AND SCREEN EXPIRATION DATE: NORMAL
VENTRICULAR RATE EKG/MIN (BPM): 79
WBC # BLD: 6.3 K/MCL (ref 4.2–11)

## 2023-06-28 PROCEDURE — 99213 OFFICE O/P EST LOW 20 MIN: CPT

## 2023-06-28 PROCEDURE — 10002016 HB COUNTER INCENTIVE SPIROMETRY

## 2023-06-28 PROCEDURE — 93005 ELECTROCARDIOGRAM TRACING: CPT | Performed by: INTERNAL MEDICINE

## 2023-06-28 PROCEDURE — 10002805 HB CONTRAST AGENT

## 2023-06-28 PROCEDURE — 13001086 HB INCENTIVE SPIROMETER W INSTRUCT

## 2023-06-28 PROCEDURE — 10002801 HB RX 250 W/O HCPCS: Performed by: INTERNAL MEDICINE

## 2023-06-28 PROCEDURE — G1004 CDSM NDSC: HCPCS

## 2023-06-28 PROCEDURE — 36415 COLL VENOUS BLD VENIPUNCTURE: CPT | Performed by: INTERNAL MEDICINE

## 2023-06-28 PROCEDURE — 94010 BREATHING CAPACITY TEST: CPT

## 2023-06-28 PROCEDURE — 85025 COMPLETE CBC W/AUTO DIFF WBC: CPT | Performed by: INTERNAL MEDICINE

## 2023-06-28 PROCEDURE — 80053 COMPREHEN METABOLIC PANEL: CPT | Performed by: INTERNAL MEDICINE

## 2023-06-28 PROCEDURE — 74174 CTA ABD&PLVS W/CONTRAST: CPT

## 2023-06-28 PROCEDURE — 86900 BLOOD TYPING SEROLOGIC ABO: CPT | Performed by: INTERNAL MEDICINE

## 2023-06-28 PROCEDURE — 71275 CT ANGIOGRAPHY CHEST: CPT

## 2023-06-28 PROCEDURE — 93880 EXTRACRANIAL BILAT STUDY: CPT

## 2023-06-28 RX ORDER — METOPROLOL TARTRATE 1 MG/ML
INJECTION, SOLUTION INTRAVENOUS PRN
Status: COMPLETED | OUTPATIENT
Start: 2023-06-28 | End: 2023-06-28

## 2023-06-28 RX ADMIN — IOHEXOL 72 ML: 350 INJECTION, SOLUTION INTRAVENOUS at 11:09

## 2023-06-28 RX ADMIN — METOPROLOL TARTRATE 5 MG: 1 INJECTION, SOLUTION INTRAVENOUS at 10:57

## 2023-07-07 ENCOUNTER — PREP FOR CASE (OUTPATIENT)
Dept: CARDIOLOGY | Age: 88
End: 2023-07-07

## 2023-07-07 DIAGNOSIS — Z00.6 ENCOUNTER FOR EXAMINATION FOR NORMAL COMPARISON OR CONTROL IN CLINICAL RESEARCH PROGRAM: ICD-10-CM

## 2023-07-07 DIAGNOSIS — I35.0 SEVERE AORTIC STENOSIS: Primary | ICD-10-CM

## 2023-07-07 DIAGNOSIS — I25.119 ATHEROSCLEROSIS OF NATIVE CORONARY ARTERY WITH ANGINA PECTORIS, UNSPECIFIED WHETHER NATIVE OR TRANSPLANTED HEART (CMD): Primary | ICD-10-CM

## 2023-07-10 ENCOUNTER — TELEPHONE (OUTPATIENT)
Dept: CARDIOLOGY | Age: 88
End: 2023-07-10

## 2023-07-13 ENCOUNTER — HOSPITAL ENCOUNTER (OUTPATIENT)
Age: 88
Setting detail: OBSERVATION
Discharge: HOME | DRG: 267 | End: 2023-07-14
Attending: INTERNAL MEDICINE | Admitting: INTERNAL MEDICINE

## 2023-07-13 DIAGNOSIS — Z98.61 S/P PTCA (PERCUTANEOUS TRANSLUMINAL CORONARY ANGIOPLASTY): Primary | ICD-10-CM

## 2023-07-13 DIAGNOSIS — I25.119 ATHEROSCLEROSIS OF NATIVE CORONARY ARTERY WITH ANGINA PECTORIS, UNSPECIFIED WHETHER NATIVE OR TRANSPLANTED HEART (CMD): ICD-10-CM

## 2023-07-13 PROBLEM — I25.10 CAD IN NATIVE ARTERY: Status: ACTIVE | Noted: 2023-07-13

## 2023-07-13 LAB
ABO + RH BLD: NORMAL
ACT BLD: 400 BASELINE/TARGET RANGES ARE SET BY CLINICIANS FOR EACH PATIENT/PROCEDURE
ANION GAP SERPL CALC-SCNC: 8 MMOL/L (ref 7–19)
BLD GP AB SCN SERPL QL GEL: NEGATIVE
BUN SERPL-MCNC: 22 MG/DL (ref 6–20)
BUN/CREAT SERPL: 45 (ref 7–25)
CALCIUM SERPL-MCNC: 9.2 MG/DL (ref 8.4–10.2)
CHLORIDE SERPL-SCNC: 102 MMOL/L (ref 97–110)
CO2 SERPL-SCNC: 31 MMOL/L (ref 21–32)
CREAT SERPL-MCNC: 0.49 MG/DL (ref 0.51–0.95)
DEPRECATED RDW RBC: 54.3 FL (ref 39–50)
ERYTHROCYTE [DISTWIDTH] IN BLOOD: 14.4 % (ref 11–15)
FASTING DURATION TIME PATIENT: ABNORMAL H
GFR SERPLBLD BASED ON 1.73 SQ M-ARVRAT: 88 ML/MIN
GLUCOSE SERPL-MCNC: 121 MG/DL (ref 70–99)
HCT VFR BLD CALC: 26.6 % (ref 36–46.5)
HGB BLD-MCNC: 7.6 G/DL (ref 12–15.5)
MAGNESIUM SERPL-MCNC: 2.4 MG/DL (ref 1.7–2.4)
MCH RBC QN AUTO: 29.6 PG (ref 26–34)
MCHC RBC AUTO-ENTMCNC: 28.6 G/DL (ref 32–36.5)
MCV RBC AUTO: 103.5 FL (ref 78–100)
NRBC BLD MANUAL-RTO: 0 /100 WBC
PLATELET # BLD AUTO: 271 K/MCL (ref 140–450)
POTASSIUM SERPL-SCNC: 3.7 MMOL/L (ref 3.4–5.1)
RBC # BLD: 2.57 MIL/MCL (ref 4–5.2)
SODIUM SERPL-SCNC: 137 MMOL/L (ref 135–145)
TYPE AND SCREEN EXPIRATION DATE: NORMAL
WBC # BLD: 7.3 K/MCL (ref 4.2–11)

## 2023-07-13 PROCEDURE — 10006023 HB SUPPLY 272: Performed by: INTERNAL MEDICINE

## 2023-07-13 PROCEDURE — 10002807 HB RX 258: Performed by: INTERNAL MEDICINE

## 2023-07-13 PROCEDURE — 99153 MOD SED SAME PHYS/QHP EA: CPT | Performed by: INTERNAL MEDICINE

## 2023-07-13 PROCEDURE — 85347 COAGULATION TIME ACTIVATED: CPT | Performed by: INTERNAL MEDICINE

## 2023-07-13 PROCEDURE — 83735 ASSAY OF MAGNESIUM: CPT | Performed by: INTERNAL MEDICINE

## 2023-07-13 PROCEDURE — 92978 ENDOLUMINL IVUS OCT C 1ST: CPT | Performed by: INTERNAL MEDICINE

## 2023-07-13 PROCEDURE — 10002805 HB CONTRAST AGENT: Performed by: INTERNAL MEDICINE

## 2023-07-13 PROCEDURE — G0378 HOSPITAL OBSERVATION PER HR: HCPCS

## 2023-07-13 PROCEDURE — 86901 BLOOD TYPING SEROLOGIC RH(D): CPT | Performed by: INTERNAL MEDICINE

## 2023-07-13 PROCEDURE — 85027 COMPLETE CBC AUTOMATED: CPT | Performed by: INTERNAL MEDICINE

## 2023-07-13 PROCEDURE — C1874 STENT, COATED/COV W/DEL SYS: HCPCS | Performed by: INTERNAL MEDICINE

## 2023-07-13 PROCEDURE — C1769 GUIDE WIRE: HCPCS | Performed by: INTERNAL MEDICINE

## 2023-07-13 PROCEDURE — 10002801 HB RX 250 W/O HCPCS: Performed by: INTERNAL MEDICINE

## 2023-07-13 PROCEDURE — 99152 MOD SED SAME PHYS/QHP 5/>YRS: CPT | Performed by: INTERNAL MEDICINE

## 2023-07-13 PROCEDURE — 36415 COLL VENOUS BLD VENIPUNCTURE: CPT | Performed by: INTERNAL MEDICINE

## 2023-07-13 PROCEDURE — 80048 BASIC METABOLIC PNL TOTAL CA: CPT | Performed by: INTERNAL MEDICINE

## 2023-07-13 PROCEDURE — C1887 CATHETER, GUIDING: HCPCS | Performed by: INTERNAL MEDICINE

## 2023-07-13 PROCEDURE — C1760 CLOSURE DEV, VASC: HCPCS | Performed by: INTERNAL MEDICINE

## 2023-07-13 PROCEDURE — 13000001 HB PHASE II RECOVERY EA 30 MINUTES: Performed by: INTERNAL MEDICINE

## 2023-07-13 PROCEDURE — 10006027 HB SUPPLY 278: Performed by: INTERNAL MEDICINE

## 2023-07-13 PROCEDURE — 10004651 HB RX, NO CHARGE ITEM: Performed by: INTERNAL MEDICINE

## 2023-07-13 PROCEDURE — 10002803 HB RX 637: Performed by: INTERNAL MEDICINE

## 2023-07-13 PROCEDURE — 92928 PRQ TCAT PLMT NTRAC ST 1 LES: CPT | Performed by: INTERNAL MEDICINE

## 2023-07-13 PROCEDURE — C1753 CATH, INTRAVAS ULTRASOUND: HCPCS | Performed by: INTERNAL MEDICINE

## 2023-07-13 PROCEDURE — 10002800 HB RX 250 W HCPCS: Performed by: INTERNAL MEDICINE

## 2023-07-13 PROCEDURE — C1725 CATH, TRANSLUMIN NON-LASER: HCPCS | Performed by: INTERNAL MEDICINE

## 2023-07-13 PROCEDURE — 10002803 HB RX 637: Performed by: PHYSICIAN ASSISTANT

## 2023-07-13 PROCEDURE — C1894 INTRO/SHEATH, NON-LASER: HCPCS | Performed by: INTERNAL MEDICINE

## 2023-07-13 DEVICE — EVEROLIMUS-ELUTING PLATINUM CHROMIUM CORONARY STENT SYSTEM
Type: IMPLANTABLE DEVICE | Site: LEFT MAIN ARTERY | Status: FUNCTIONAL
Brand: SYNERGY MEGATRON™

## 2023-07-13 DEVICE — PERCLOSE™ PROSTYLE™ SUTURE-MEDIATED CLOSURE AND REPAIR SYSTEM
Type: IMPLANTABLE DEVICE | Site: GROIN | Status: FUNCTIONAL
Brand: PERCLOSE™ PROSTYLE™

## 2023-07-13 RX ORDER — CLOPIDOGREL BISULFATE 75 MG/1
75 TABLET ORAL DAILY
Status: DISCONTINUED | OUTPATIENT
Start: 2023-07-14 | End: 2023-07-14 | Stop reason: HOSPADM

## 2023-07-13 RX ORDER — ACETAMINOPHEN 325 MG/1
650 TABLET ORAL EVERY 4 HOURS PRN
Status: DISCONTINUED | OUTPATIENT
Start: 2023-07-13 | End: 2023-07-14 | Stop reason: HOSPADM

## 2023-07-13 RX ORDER — CLOPIDOGREL BISULFATE 75 MG/1
75 TABLET ORAL DAILY
Status: ON HOLD | COMMUNITY
End: 2023-07-14 | Stop reason: SDUPTHER

## 2023-07-13 RX ORDER — TIZANIDINE 4 MG/1
4 TABLET ORAL EVERY 8 HOURS PRN
Status: DISCONTINUED | OUTPATIENT
Start: 2023-07-13 | End: 2023-07-14 | Stop reason: HOSPADM

## 2023-07-13 RX ORDER — HYDRALAZINE HYDROCHLORIDE 20 MG/ML
10 INJECTION INTRAMUSCULAR; INTRAVENOUS EVERY 4 HOURS PRN
Status: DISCONTINUED | OUTPATIENT
Start: 2023-07-13 | End: 2023-07-14 | Stop reason: HOSPADM

## 2023-07-13 RX ORDER — FERROUS SULFATE 325(65) MG
325 TABLET ORAL 2 TIMES DAILY WITH MEALS
Status: DISCONTINUED | OUTPATIENT
Start: 2023-07-13 | End: 2023-07-14 | Stop reason: HOSPADM

## 2023-07-13 RX ORDER — HEPARIN SODIUM 1000 [USP'U]/ML
INJECTION, SOLUTION INTRAVENOUS; SUBCUTANEOUS PRN
Status: DISCONTINUED | OUTPATIENT
Start: 2023-07-13 | End: 2023-07-13 | Stop reason: HOSPADM

## 2023-07-13 RX ORDER — ACETAMINOPHEN 500 MG
500 TABLET ORAL 2 TIMES DAILY PRN
Status: DISCONTINUED | OUTPATIENT
Start: 2023-07-13 | End: 2023-07-13 | Stop reason: ALTCHOICE

## 2023-07-13 RX ORDER — CLONIDINE HYDROCHLORIDE 0.1 MG/1
0.1 TABLET ORAL EVERY 4 HOURS PRN
Status: DISCONTINUED | OUTPATIENT
Start: 2023-07-13 | End: 2023-07-13 | Stop reason: HOSPADM

## 2023-07-13 RX ORDER — ASPIRIN 325 MG
325 TABLET ORAL ONCE
Status: DISCONTINUED | OUTPATIENT
Start: 2023-07-13 | End: 2023-07-13 | Stop reason: HOSPADM

## 2023-07-13 RX ORDER — ASPIRIN 81 MG/1
81 TABLET ORAL DAILY
Status: DISCONTINUED | OUTPATIENT
Start: 2023-07-14 | End: 2023-07-14 | Stop reason: HOSPADM

## 2023-07-13 RX ORDER — NITROGLYCERIN 0.4 MG/1
0.4 TABLET SUBLINGUAL EVERY 5 MIN PRN
Status: DISCONTINUED | OUTPATIENT
Start: 2023-07-13 | End: 2023-07-14 | Stop reason: HOSPADM

## 2023-07-13 RX ORDER — DIPHENHYDRAMINE HYDROCHLORIDE 50 MG/ML
INJECTION INTRAMUSCULAR; INTRAVENOUS PRN
Status: DISCONTINUED | OUTPATIENT
Start: 2023-07-13 | End: 2023-07-13 | Stop reason: HOSPADM

## 2023-07-13 RX ORDER — PANTOPRAZOLE SODIUM 40 MG/1
40 TABLET, DELAYED RELEASE ORAL DAILY
Status: DISCONTINUED | OUTPATIENT
Start: 2023-07-13 | End: 2023-07-14 | Stop reason: HOSPADM

## 2023-07-13 RX ORDER — SODIUM CHLORIDE 9 MG/ML
INJECTION, SOLUTION INTRAVENOUS CONTINUOUS
Status: ACTIVE | OUTPATIENT
Start: 2023-07-13 | End: 2023-07-13

## 2023-07-13 RX ORDER — ACETAMINOPHEN 650 MG/1
650 SUPPOSITORY RECTAL EVERY 4 HOURS PRN
Status: DISCONTINUED | OUTPATIENT
Start: 2023-07-13 | End: 2023-07-14 | Stop reason: HOSPADM

## 2023-07-13 RX ORDER — CLOPIDOGREL BISULFATE 75 MG/1
TABLET ORAL
Status: DISPENSED
Start: 2023-07-13 | End: 2023-07-13

## 2023-07-13 RX ORDER — ATORVASTATIN CALCIUM 20 MG/1
20 TABLET, FILM COATED ORAL NIGHTLY
Status: DISCONTINUED | OUTPATIENT
Start: 2023-07-13 | End: 2023-07-14 | Stop reason: HOSPADM

## 2023-07-13 RX ORDER — ASPIRIN 81 MG/1
81 TABLET, CHEWABLE ORAL DAILY
Status: DISCONTINUED | OUTPATIENT
Start: 2023-07-14 | End: 2023-07-14 | Stop reason: HOSPADM

## 2023-07-13 RX ORDER — TRAMADOL HYDROCHLORIDE 50 MG/1
50 TABLET ORAL 2 TIMES DAILY
Status: DISCONTINUED | OUTPATIENT
Start: 2023-07-13 | End: 2023-07-14 | Stop reason: HOSPADM

## 2023-07-13 RX ORDER — HYDRALAZINE HYDROCHLORIDE 20 MG/ML
10 INJECTION INTRAMUSCULAR; INTRAVENOUS EVERY 4 HOURS PRN
Status: DISCONTINUED | OUTPATIENT
Start: 2023-07-13 | End: 2023-07-13 | Stop reason: HOSPADM

## 2023-07-13 RX ORDER — CLONIDINE HYDROCHLORIDE 0.1 MG/1
0.1 TABLET ORAL EVERY 4 HOURS PRN
Status: DISCONTINUED | OUTPATIENT
Start: 2023-07-13 | End: 2023-07-14 | Stop reason: HOSPADM

## 2023-07-13 RX ORDER — 0.9 % SODIUM CHLORIDE 0.9 %
2 VIAL (ML) INJECTION EVERY 12 HOURS SCHEDULED
Status: DISCONTINUED | OUTPATIENT
Start: 2023-07-13 | End: 2023-07-13 | Stop reason: HOSPADM

## 2023-07-13 RX ORDER — CLOPIDOGREL 300 MG/1
600 TABLET, FILM COATED ORAL ONCE
Status: COMPLETED | OUTPATIENT
Start: 2023-07-13 | End: 2023-07-13

## 2023-07-13 RX ORDER — 0.9 % SODIUM CHLORIDE 0.9 %
2 VIAL (ML) INJECTION EVERY 12 HOURS SCHEDULED
Status: DISCONTINUED | OUTPATIENT
Start: 2023-07-13 | End: 2023-07-14 | Stop reason: HOSPADM

## 2023-07-13 RX ORDER — MIDAZOLAM HYDROCHLORIDE 1 MG/ML
INJECTION, SOLUTION INTRAMUSCULAR; INTRAVENOUS PRN
Status: DISCONTINUED | OUTPATIENT
Start: 2023-07-13 | End: 2023-07-13 | Stop reason: HOSPADM

## 2023-07-13 RX ORDER — FUROSEMIDE 20 MG/1
20 TABLET ORAL DAILY
Status: DISCONTINUED | OUTPATIENT
Start: 2023-07-14 | End: 2023-07-14 | Stop reason: HOSPADM

## 2023-07-13 RX ORDER — LIDOCAINE HYDROCHLORIDE 20 MG/ML
INJECTION, SOLUTION EPIDURAL; INFILTRATION; INTRACAUDAL; PERINEURAL PRN
Status: DISCONTINUED | OUTPATIENT
Start: 2023-07-13 | End: 2023-07-13 | Stop reason: HOSPADM

## 2023-07-13 RX ORDER — CLOPIDOGREL BISULFATE 75 MG/1
75 TABLET ORAL DAILY
Status: DISCONTINUED | OUTPATIENT
Start: 2023-07-14 | End: 2023-07-13 | Stop reason: SDUPTHER

## 2023-07-13 RX ADMIN — ATORVASTATIN CALCIUM 20 MG: 20 TABLET, FILM COATED ORAL at 20:21

## 2023-07-13 RX ADMIN — SODIUM CHLORIDE, PRESERVATIVE FREE 2 ML: 5 INJECTION INTRAVENOUS at 20:22

## 2023-07-13 RX ADMIN — TRAMADOL HYDROCHLORIDE 50 MG: 50 TABLET, COATED ORAL at 20:21

## 2023-07-13 RX ADMIN — SODIUM CHLORIDE 2 ML: 9 INJECTION, SOLUTION INTRAMUSCULAR; INTRAVENOUS; SUBCUTANEOUS at 10:58

## 2023-07-13 RX ADMIN — FERROUS SULFATE TAB 325 MG (65 MG ELEMENTAL FE) 325 MG: 325 (65 FE) TAB at 17:45

## 2023-07-13 RX ADMIN — SODIUM CHLORIDE, PRESERVATIVE FREE 2 ML: 5 INJECTION INTRAVENOUS at 14:11

## 2023-07-13 RX ADMIN — TIZANIDINE 4 MG: 4 TABLET ORAL at 21:15

## 2023-07-13 RX ADMIN — SODIUM CHLORIDE: 9 INJECTION, SOLUTION INTRAVENOUS at 14:39

## 2023-07-13 RX ADMIN — CLOPIDOGREL BISULFATE 600 MG: 300 TABLET, FILM COATED ORAL at 11:04

## 2023-07-13 SDOH — ECONOMIC STABILITY: TRANSPORTATION INSECURITY
IN THE PAST 12 MONTHS, HAS LACK OF TRANSPORTATION KEPT YOU FROM MEETINGS, WORK, OR FROM GETTING THINGS NEEDED FOR DAILY LIVING?: NO

## 2023-07-13 SDOH — HEALTH STABILITY: GENERAL
BECAUSE OF A PHYSICAL, MENTAL, OR EMOTIONAL CONDITION, DO YOU HAVE SERIOUS DIFFICULTY CONCENTRATING, REMEMBERING OR MAKING DECISIONS?: NO

## 2023-07-13 SDOH — ECONOMIC STABILITY: TRANSPORTATION INSECURITY
IN THE PAST 12 MONTHS, HAS THE LACK OF TRANSPORTATION KEPT YOU FROM MEDICAL APPOINTMENTS OR FROM GETTING MEDICATIONS?: NO

## 2023-07-13 SDOH — SOCIAL STABILITY: SOCIAL NETWORK: SUPPORT SYSTEMS: CHILDREN

## 2023-07-13 SDOH — SOCIAL STABILITY: SOCIAL NETWORK
HOW OFTEN DO YOU SEE OR TALK TO PEOPLE THAT YOU CARE ABOUT AND FEEL CLOSE TO? (FOR EXAMPLE: TALKING TO FRIENDS ON THE PHONE, VISITING FRIENDS OR FAMILY, GOING TO CHURCH OR CLUB MEETINGS): I CHOOSE NOT TO ANSWER THE QUESTION

## 2023-07-13 SDOH — HEALTH STABILITY: PHYSICAL HEALTH: DO YOU HAVE SERIOUS DIFFICULTY WALKING OR CLIMBING STAIRS?: NO

## 2023-07-13 SDOH — ECONOMIC STABILITY: GENERAL

## 2023-07-13 SDOH — ECONOMIC STABILITY: HOUSING INSECURITY: WHAT IS YOUR LIVING SITUATION TODAY?: ALONE

## 2023-07-13 SDOH — HEALTH STABILITY: GENERAL: BECAUSE OF A PHYSICAL, MENTAL, OR EMOTIONAL CONDITION, DO YOU HAVE DIFFICULTY DOING ERRANDS ALONE?: NO

## 2023-07-13 SDOH — ECONOMIC STABILITY: HOUSING INSECURITY: ARE YOU WORRIED ABOUT LOSING YOUR HOUSING?: NO

## 2023-07-13 SDOH — HEALTH STABILITY: PHYSICAL HEALTH: DO YOU HAVE DIFFICULTY DRESSING OR BATHING?: NO

## 2023-07-13 SDOH — ECONOMIC STABILITY: FOOD INSECURITY: HOW OFTEN IN THE PAST 12 MONTHS WERE YOU WORRIED OR STRESSED ABOUT HAVING ENOUGH MONEY TO BUY NUTRITIOUS MEALS?: NEVER

## 2023-07-13 SDOH — ECONOMIC STABILITY: HOUSING INSECURITY: WHAT IS YOUR LIVING SITUATION TODAY?: HOUSE

## 2023-07-13 ASSESSMENT — PATIENT HEALTH QUESTIONNAIRE - PHQ9
2. FEELING DOWN, DEPRESSED OR HOPELESS: NOT AT ALL
CLINICAL INTERPRETATION OF PHQ2 SCORE: NO FURTHER SCREENING NEEDED
SUM OF ALL RESPONSES TO PHQ9 QUESTIONS 1 AND 2: 0
IS PATIENT ABLE TO COMPLETE PHQ2 OR PHQ9: YES
1. LITTLE INTEREST OR PLEASURE IN DOING THINGS: NOT AT ALL
SUM OF ALL RESPONSES TO PHQ9 QUESTIONS 1 AND 2: 0

## 2023-07-13 ASSESSMENT — ORIENTATION MEMORY CONCENTRATION TEST (OMCT)
COUNT BACKWARDS FROM 20 TO 1: CORRECT
REPEAT THE NAME AND ADDRESS I ASKED YOU TO REMEMBER: CORRECT
SAY THE MONTHS IN REVERSE ORDER STARTING WITH LAST MONTH: CORRECT
OMCT SCORE: 0
WHAT TIME IS IT (NO WATCH OR CLOCK): CORRECT
WHAT MONTH IS IT NOW: CORRECT
OMCT INTERPRETATION: 0-6: NO SIGNIFICANT IMPAIRMENT
WHAT YEAR IS IT NOW (MUST BE EXACT): CORRECT

## 2023-07-13 ASSESSMENT — ACTIVITIES OF DAILY LIVING (ADL)
ADL_SHORT_OF_BREATH: NO
ADL_SCORE: 12
RECENT_DECLINE_ADL: NO
ADL_BEFORE_ADMISSION: INDEPENDENT

## 2023-07-13 ASSESSMENT — COLUMBIA-SUICIDE SEVERITY RATING SCALE - C-SSRS
6. HAVE YOU EVER DONE ANYTHING, STARTED TO DO ANYTHING, OR PREPARED TO DO ANYTHING TO END YOUR LIFE?: NO
2. HAVE YOU ACTUALLY HAD ANY THOUGHTS OF KILLING YOURSELF?: NO
1. WITHIN THE PAST MONTH, HAVE YOU WISHED YOU WERE DEAD OR WISHED YOU COULD GO TO SLEEP AND NOT WAKE UP?: NO
IS THE PATIENT ABLE TO COMPLETE C-SSRS: YES

## 2023-07-13 ASSESSMENT — LIFESTYLE VARIABLES
ALCOHOL_USE_STATUS: NO OR LOW RISK WITH VALIDATED TOOL
HOW OFTEN DO YOU HAVE 6 OR MORE DRINKS ON ONE OCCASION: NEVER
HOW MANY STANDARD DRINKS CONTAINING ALCOHOL DO YOU HAVE ON A TYPICAL DAY: 0,1 OR 2
HOW OFTEN DO YOU HAVE A DRINK CONTAINING ALCOHOL: NEVER
AUDIT-C TOTAL SCORE: 0

## 2023-07-13 ASSESSMENT — PAIN SCALES - GENERAL
PAINLEVEL_OUTOF10: 0

## 2023-07-14 VITALS
TEMPERATURE: 98.6 F | HEART RATE: 92 BPM | BODY MASS INDEX: 20 KG/M2 | DIASTOLIC BLOOD PRESSURE: 53 MMHG | RESPIRATION RATE: 16 BRPM | WEIGHT: 101.85 LBS | OXYGEN SATURATION: 92 % | SYSTOLIC BLOOD PRESSURE: 143 MMHG | HEIGHT: 60 IN

## 2023-07-14 LAB
ANION GAP SERPL CALC-SCNC: 6 MMOL/L (ref 7–19)
BUN SERPL-MCNC: 26 MG/DL (ref 6–20)
BUN/CREAT SERPL: 58 (ref 7–25)
CALCIUM SERPL-MCNC: 9.3 MG/DL (ref 8.4–10.2)
CHLORIDE SERPL-SCNC: 106 MMOL/L (ref 97–110)
CO2 SERPL-SCNC: 30 MMOL/L (ref 21–32)
CREAT SERPL-MCNC: 0.45 MG/DL (ref 0.51–0.95)
DEPRECATED RDW RBC: 55.1 FL (ref 39–50)
ERYTHROCYTE [DISTWIDTH] IN BLOOD: 14.6 % (ref 11–15)
FASTING DURATION TIME PATIENT: ABNORMAL H
GFR SERPLBLD BASED ON 1.73 SQ M-ARVRAT: 90 ML/MIN
GLUCOSE SERPL-MCNC: 127 MG/DL (ref 70–99)
HCT VFR BLD CALC: 26.6 % (ref 36–46.5)
HGB BLD-MCNC: 7.5 G/DL (ref 12–15.5)
MCH RBC QN AUTO: 29.4 PG (ref 26–34)
MCHC RBC AUTO-ENTMCNC: 28.2 G/DL (ref 32–36.5)
MCV RBC AUTO: 104.3 FL (ref 78–100)
NRBC BLD MANUAL-RTO: 0 /100 WBC
PLATELET # BLD AUTO: 280 K/MCL (ref 140–450)
POTASSIUM SERPL-SCNC: 4 MMOL/L (ref 3.4–5.1)
RBC # BLD: 2.55 MIL/MCL (ref 4–5.2)
SODIUM SERPL-SCNC: 138 MMOL/L (ref 135–145)
WBC # BLD: 7.6 K/MCL (ref 4.2–11)

## 2023-07-14 PROCEDURE — G0378 HOSPITAL OBSERVATION PER HR: HCPCS

## 2023-07-14 PROCEDURE — 85027 COMPLETE CBC AUTOMATED: CPT | Performed by: INTERNAL MEDICINE

## 2023-07-14 PROCEDURE — 80048 BASIC METABOLIC PNL TOTAL CA: CPT | Performed by: INTERNAL MEDICINE

## 2023-07-14 PROCEDURE — 10004651 HB RX, NO CHARGE ITEM: Performed by: INTERNAL MEDICINE

## 2023-07-14 PROCEDURE — 10002803 HB RX 637: Performed by: PHYSICIAN ASSISTANT

## 2023-07-14 PROCEDURE — 10002803 HB RX 637: Performed by: INTERNAL MEDICINE

## 2023-07-14 PROCEDURE — 36415 COLL VENOUS BLD VENIPUNCTURE: CPT | Performed by: INTERNAL MEDICINE

## 2023-07-14 RX ORDER — CLOPIDOGREL BISULFATE 75 MG/1
75 TABLET ORAL DAILY
Qty: 30 TABLET | Refills: 0 | Status: SHIPPED | OUTPATIENT
Start: 2023-07-14

## 2023-07-14 RX ADMIN — ASPIRIN 81 MG CHEWABLE TABLET 81 MG: 81 TABLET CHEWABLE at 08:48

## 2023-07-14 RX ADMIN — TRAMADOL HYDROCHLORIDE 50 MG: 50 TABLET, COATED ORAL at 08:47

## 2023-07-14 RX ADMIN — PANTOPRAZOLE SODIUM 40 MG: 40 TABLET, DELAYED RELEASE ORAL at 06:30

## 2023-07-14 RX ADMIN — ACETAMINOPHEN 650 MG: 325 TABLET ORAL at 07:26

## 2023-07-14 RX ADMIN — TIZANIDINE 4 MG: 4 TABLET ORAL at 08:52

## 2023-07-14 RX ADMIN — SODIUM CHLORIDE, PRESERVATIVE FREE 2 ML: 5 INJECTION INTRAVENOUS at 08:50

## 2023-07-14 RX ADMIN — CLOPIDOGREL BISULFATE 75 MG: 75 TABLET, FILM COATED ORAL at 08:48

## 2023-07-14 RX ADMIN — FERROUS SULFATE TAB 325 MG (65 MG ELEMENTAL FE) 325 MG: 325 (65 FE) TAB at 08:47

## 2023-07-14 RX ADMIN — FUROSEMIDE 20 MG: 20 TABLET ORAL at 08:47

## 2023-07-14 ASSESSMENT — PAIN SCALES - GENERAL
PAINLEVEL_OUTOF10: 6
PAINLEVEL_OUTOF10: 6

## 2023-07-17 ENCOUNTER — ANESTHESIA (OUTPATIENT)
Dept: CARDIOLOGY | Age: 88
DRG: 267 | End: 2023-07-17

## 2023-07-17 ENCOUNTER — APPOINTMENT (OUTPATIENT)
Dept: CARDIOLOGY | Age: 88
DRG: 267 | End: 2023-07-17
Attending: INTERNAL MEDICINE

## 2023-07-17 ENCOUNTER — HOSPITAL ENCOUNTER (INPATIENT)
Age: 88
LOS: 2 days | Discharge: HOME | DRG: 267 | End: 2023-07-19
Attending: INTERNAL MEDICINE | Admitting: INTERNAL MEDICINE

## 2023-07-17 ENCOUNTER — HOSPITAL ENCOUNTER (OUTPATIENT)
Dept: CARDIOLOGY | Age: 88
End: 2023-07-17
Attending: INTERNAL MEDICINE

## 2023-07-17 ENCOUNTER — ANESTHESIA EVENT (OUTPATIENT)
Dept: CARDIOLOGY | Age: 88
DRG: 267 | End: 2023-07-17

## 2023-07-17 ENCOUNTER — APPOINTMENT (OUTPATIENT)
Dept: CARDIOLOGY | Age: 88
DRG: 267 | End: 2023-07-17

## 2023-07-17 DIAGNOSIS — I35.0 SEVERE AORTIC STENOSIS: ICD-10-CM

## 2023-07-17 DIAGNOSIS — Z00.6 ENCOUNTER FOR EXAMINATION FOR NORMAL COMPARISON OR CONTROL IN CLINICAL RESEARCH PROGRAM: ICD-10-CM

## 2023-07-17 LAB
ACT BLD: NORMAL BASELINE/TARGET RANGES ARE SET BY CLINICIANS FOR EACH PATIENT/PROCEDURE
AORTIC VALVE AREA: 1.65
ATRIAL RATE (BPM): 77
AV MEAN GRADIENT (AVMG): 12
AV MEAN VELOCITY (AVMV): 1.6
AV PEAK GRADIENT (AVPG): 26
AV PEAK VELOCITY (AVPV): 2.55
AV STENOSIS SEVERITY TEXT: NORMAL
AVI LVOT PEAK GRADIENT (LVOTMG): 1.2
BLOOD EXPIRATION DATE: NORMAL
BLOOD EXPIRATION DATE: NORMAL
CROSSMATCH RESULT: NORMAL
CROSSMATCH RESULT: NORMAL
DEPRECATED RDW RBC: 57.9 FL (ref 39–50)
DEPRECATED RDW RBC: 61.1 FL (ref 39–50)
DISPENSE STATUS: NORMAL
DISPENSE STATUS: NORMAL
ERYTHROCYTE [DISTWIDTH] IN BLOOD: 14.7 % (ref 11–15)
ERYTHROCYTE [DISTWIDTH] IN BLOOD: 17.2 % (ref 11–15)
FERRITIN SERPL-MCNC: 18 NG/ML (ref 8–252)
GLUCOSE BLDC GLUCOMTR-MCNC: 133 MG/DL (ref 70–99)
HCT VFR BLD CALC: 20.3 % (ref 36–46.5)
HCT VFR BLD CALC: 31.9 % (ref 36–46.5)
HCT VFR BLD CALC: 32.5 % (ref 36–46.5)
HGB BLD-MCNC: 10.1 G/DL (ref 12–15.5)
HGB BLD-MCNC: 5.7 G/DL (ref 12–15.5)
HGB BLD-MCNC: 9.7 G/DL (ref 12–15.5)
IRON SATN MFR SERPL: 5 % (ref 15–45)
IRON SERPL-MCNC: 19 MCG/DL (ref 50–170)
ISBT BLOOD TYPE: 5100
ISBT BLOOD TYPE: 5100
ISSUE DATE/TIME: NORMAL
ISSUE DATE/TIME: NORMAL
LEFT INTERNAL DIMENSION IN SYSTOLE (LVSD): 1.2
LEFT VENTRICULAR INTERNAL DIMENSION IN DIASTOLE (LVDD): 2.1
LEFT VENTRICULAR POSTERIOR WALL IN END DIASTOLE (LVPW): 3.1
LV EF: NORMAL %
LVOT 2D (LVOTD): 24.2
LVOT VTI (LVOTVTI): 1.18
MCH RBC QN AUTO: 29.7 PG (ref 26–34)
MCH RBC QN AUTO: 29.8 PG (ref 26–34)
MCHC RBC AUTO-ENTMCNC: 28.1 G/DL (ref 32–36.5)
MCHC RBC AUTO-ENTMCNC: 31.1 G/DL (ref 32–36.5)
MCV RBC AUTO: 106.3 FL (ref 78–100)
MCV RBC AUTO: 95.6 FL (ref 78–100)
NRBC BLD MANUAL-RTO: 0 /100 WBC
NRBC BLD MANUAL-RTO: 0 /100 WBC
P AXIS (DEGREES): 78
PLATELET # BLD AUTO: 220 K/MCL (ref 140–450)
PLATELET # BLD AUTO: 248 K/MCL (ref 140–450)
PR-INTERVAL (MSEC): 214
PRODUCT CODE: NORMAL
PRODUCT CODE: NORMAL
PRODUCT DESCRIPTION: NORMAL
PRODUCT DESCRIPTION: NORMAL
PRODUCT ID: NORMAL
PRODUCT ID: NORMAL
QRS-INTERVAL (MSEC): 92
QT-INTERVAL (MSEC): 396
QTC: 448
R AXIS (DEGREES): 69
RBC # BLD: 1.91 MIL/MCL (ref 4–5.2)
RBC # BLD: 3.4 MIL/MCL (ref 4–5.2)
REPORT TEXT: NORMAL
RV END SYSTOLIC LONGITUDINAL STRAIN FREE WALL (RVGS): 2.1
T AXIS (DEGREES): 42
TIBC SERPL-MCNC: 382 MCG/DL (ref 250–450)
UNIT BLOOD TYPE: NORMAL
UNIT BLOOD TYPE: NORMAL
UNIT NUMBER: NORMAL
UNIT NUMBER: NORMAL
VENTRICULAR RATE EKG/MIN (BPM): 77
WBC # BLD: 10 K/MCL (ref 4.2–11)
WBC # BLD: 5.8 K/MCL (ref 4.2–11)

## 2023-07-17 PROCEDURE — 10004180 HB COUNTER-TRANSPORT

## 2023-07-17 PROCEDURE — C1769 GUIDE WIRE: HCPCS | Performed by: INTERNAL MEDICINE

## 2023-07-17 PROCEDURE — 85014 HEMATOCRIT: CPT | Performed by: HOSPITALIST

## 2023-07-17 PROCEDURE — 10006023 HB SUPPLY 272: Performed by: INTERNAL MEDICINE

## 2023-07-17 PROCEDURE — 10004452 HB PACU ADDL 30 MINUTES: Performed by: INTERNAL MEDICINE

## 2023-07-17 PROCEDURE — 85027 COMPLETE CBC AUTOMATED: CPT | Performed by: INTERNAL MEDICINE

## 2023-07-17 PROCEDURE — 93005 ELECTROCARDIOGRAM TRACING: CPT

## 2023-07-17 PROCEDURE — 10002800 HB RX 250 W HCPCS: Performed by: ANESTHESIOLOGY

## 2023-07-17 PROCEDURE — 10003585 HB ROOM CHARGE INTERMEDIATE CARE

## 2023-07-17 PROCEDURE — 13001086 HB INCENTIVE SPIROMETER W INSTRUCT

## 2023-07-17 PROCEDURE — 10002800 HB RX 250 W HCPCS: Performed by: NURSE ANESTHETIST, CERTIFIED REGISTERED

## 2023-07-17 PROCEDURE — 93325 DOPPLER ECHO COLOR FLOW MAPG: CPT

## 2023-07-17 PROCEDURE — 10002803 HB RX 637: Performed by: ANESTHESIOLOGY

## 2023-07-17 PROCEDURE — C1760 CLOSURE DEV, VASC: HCPCS | Performed by: INTERNAL MEDICINE

## 2023-07-17 PROCEDURE — 10002801 HB RX 250 W/O HCPCS: Performed by: NURSE ANESTHETIST, CERTIFIED REGISTERED

## 2023-07-17 PROCEDURE — P9016 RBC LEUKOCYTES REDUCED: HCPCS

## 2023-07-17 PROCEDURE — 83550 IRON BINDING TEST: CPT | Performed by: HOSPITALIST

## 2023-07-17 PROCEDURE — 10002805 HB CONTRAST AGENT: Performed by: INTERNAL MEDICINE

## 2023-07-17 PROCEDURE — 10002800 HB RX 250 W HCPCS

## 2023-07-17 PROCEDURE — 85027 COMPLETE CBC AUTOMATED: CPT

## 2023-07-17 PROCEDURE — 10004451 HB PACU RECOVERY 1ST 30 MINUTES: Performed by: INTERNAL MEDICINE

## 2023-07-17 PROCEDURE — 10002801 HB RX 250 W/O HCPCS: Performed by: INTERNAL MEDICINE

## 2023-07-17 PROCEDURE — 02RF38Z REPLACEMENT OF AORTIC VALVE WITH ZOOPLASTIC TISSUE, PERCUTANEOUS APPROACH: ICD-10-PCS | Performed by: INTERNAL MEDICINE

## 2023-07-17 PROCEDURE — 10002801 HB RX 250 W/O HCPCS

## 2023-07-17 PROCEDURE — C1894 INTRO/SHEATH, NON-LASER: HCPCS | Performed by: INTERNAL MEDICINE

## 2023-07-17 PROCEDURE — 10002807 HB RX 258

## 2023-07-17 PROCEDURE — 10002801 HB RX 250 W/O HCPCS: Performed by: ANESTHESIOLOGY

## 2023-07-17 PROCEDURE — 13000008 HB ANESTHESIA MAC OUTSIDE OR: Performed by: INTERNAL MEDICINE

## 2023-07-17 PROCEDURE — 10004651 HB RX, NO CHARGE ITEM

## 2023-07-17 PROCEDURE — 10002807 HB RX 258: Performed by: NURSE ANESTHETIST, CERTIFIED REGISTERED

## 2023-07-17 PROCEDURE — 33361 REPLACE AORTIC VALVE PERQ: CPT | Performed by: INTERNAL MEDICINE

## 2023-07-17 PROCEDURE — 82746 ASSAY OF FOLIC ACID SERUM: CPT | Performed by: HOSPITALIST

## 2023-07-17 PROCEDURE — 10002803 HB RX 637: Performed by: HOSPITALIST

## 2023-07-17 PROCEDURE — 10002016 HB COUNTER INCENTIVE SPIROMETRY

## 2023-07-17 PROCEDURE — 86923 COMPATIBILITY TEST ELECTRIC: CPT

## 2023-07-17 PROCEDURE — 13003289 HB OXYGEN THERAPY DAILY

## 2023-07-17 PROCEDURE — 85347 COAGULATION TIME ACTIVATED: CPT | Performed by: INTERNAL MEDICINE

## 2023-07-17 PROCEDURE — 10002800 HB RX 250 W HCPCS: Performed by: HOSPITALIST

## 2023-07-17 PROCEDURE — 10002803 HB RX 637

## 2023-07-17 PROCEDURE — 82728 ASSAY OF FERRITIN: CPT | Performed by: HOSPITALIST

## 2023-07-17 PROCEDURE — 36415 COLL VENOUS BLD VENIPUNCTURE: CPT | Performed by: INTERNAL MEDICINE

## 2023-07-17 PROCEDURE — 10006027 HB SUPPLY 278: Performed by: INTERNAL MEDICINE

## 2023-07-17 DEVICE — VALVE AOR SAPIEN 3 ULTRA RESILIA 20MM: Type: IMPLANTABLE DEVICE | Site: AORTIC VALVE | Status: FUNCTIONAL

## 2023-07-17 DEVICE — ANGIO-SEAL VIP VASCULAR CLOSURE DEVICE
Type: IMPLANTABLE DEVICE | Site: FEMORAL ARTERY | Status: FUNCTIONAL
Brand: ANGIO-SEAL

## 2023-07-17 RX ORDER — POTASSIUM CHLORIDE 29.8 MG/ML
40 INJECTION INTRAVENOUS PRN
Status: DISCONTINUED | OUTPATIENT
Start: 2023-07-17 | End: 2023-07-19 | Stop reason: HOSPADM

## 2023-07-17 RX ORDER — SODIUM CHLORIDE 9 MG/ML
INJECTION, SOLUTION INTRAVENOUS CONTINUOUS PRN
Status: DISCONTINUED | OUTPATIENT
Start: 2023-07-17 | End: 2023-07-17

## 2023-07-17 RX ORDER — BISACODYL 10 MG
10 SUPPOSITORY, RECTAL RECTAL ONCE
Status: DISCONTINUED | OUTPATIENT
Start: 2023-07-19 | End: 2023-07-19 | Stop reason: HOSPADM

## 2023-07-17 RX ORDER — ENALAPRILAT 1.25 MG/ML
1.25 INJECTION INTRAVENOUS
Status: DISCONTINUED | OUTPATIENT
Start: 2023-07-17 | End: 2023-07-17 | Stop reason: HOSPADM

## 2023-07-17 RX ORDER — SODIUM CHLORIDE, SODIUM LACTATE, POTASSIUM CHLORIDE, CALCIUM CHLORIDE 600; 310; 30; 20 MG/100ML; MG/100ML; MG/100ML; MG/100ML
INJECTION, SOLUTION INTRAVENOUS CONTINUOUS
Status: DISCONTINUED | OUTPATIENT
Start: 2023-07-17 | End: 2023-07-17 | Stop reason: HOSPADM

## 2023-07-17 RX ORDER — FUROSEMIDE 10 MG/ML
20 INJECTION INTRAMUSCULAR; INTRAVENOUS ONCE
Status: COMPLETED | OUTPATIENT
Start: 2023-07-17 | End: 2023-07-17

## 2023-07-17 RX ORDER — POLYETHYLENE GLYCOL 3350 17 G/17G
17 POWDER, FOR SOLUTION ORAL 2 TIMES DAILY
Status: DISCONTINUED | OUTPATIENT
Start: 2023-07-21 | End: 2023-07-19 | Stop reason: HOSPADM

## 2023-07-17 RX ORDER — PROTAMINE SULFATE 10 MG/ML
INJECTION, SOLUTION INTRAVENOUS PRN
Status: DISCONTINUED | OUTPATIENT
Start: 2023-07-17 | End: 2023-07-17

## 2023-07-17 RX ORDER — POTASSIUM CHLORIDE 1.5 G/1.58G
20 POWDER, FOR SOLUTION ORAL PRN
Status: DISCONTINUED | OUTPATIENT
Start: 2023-07-17 | End: 2023-07-19 | Stop reason: HOSPADM

## 2023-07-17 RX ORDER — HYDRALAZINE HYDROCHLORIDE 20 MG/ML
INJECTION INTRAMUSCULAR; INTRAVENOUS
Status: COMPLETED
Start: 2023-07-17 | End: 2023-07-17

## 2023-07-17 RX ORDER — ACETAMINOPHEN 325 MG/1
650 TABLET ORAL
Status: ACTIVE | OUTPATIENT
Start: 2023-07-17 | End: 2023-07-17

## 2023-07-17 RX ORDER — DEXTROSE MONOHYDRATE, SODIUM CHLORIDE, AND POTASSIUM CHLORIDE 50; 2.98; 4.5 G/1000ML; G/1000ML; G/1000ML
INJECTION, SOLUTION INTRAVENOUS CONTINUOUS PRN
Status: DISCONTINUED | OUTPATIENT
Start: 2023-07-17 | End: 2023-07-19 | Stop reason: HOSPADM

## 2023-07-17 RX ORDER — POTASSIUM CHLORIDE 1.5 G/1.58G
40 POWDER, FOR SOLUTION ORAL PRN
Status: DISCONTINUED | OUTPATIENT
Start: 2023-07-17 | End: 2023-07-19 | Stop reason: HOSPADM

## 2023-07-17 RX ORDER — SODIUM CHLORIDE 9 MG/ML
INJECTION, SOLUTION INTRAVENOUS CONTINUOUS PRN
Status: DISCONTINUED | OUTPATIENT
Start: 2023-07-17 | End: 2023-07-19 | Stop reason: HOSPADM

## 2023-07-17 RX ORDER — AMOXICILLIN 250 MG
2 CAPSULE ORAL DAILY
Status: DISCONTINUED | OUTPATIENT
Start: 2023-07-18 | End: 2023-07-19 | Stop reason: HOSPADM

## 2023-07-17 RX ORDER — HEPARIN SODIUM 1000 [USP'U]/ML
INJECTION, SOLUTION INTRAVENOUS; SUBCUTANEOUS PRN
Status: DISCONTINUED | OUTPATIENT
Start: 2023-07-17 | End: 2023-07-17

## 2023-07-17 RX ORDER — POTASSIUM CHLORIDE 29.8 MG/ML
20 INJECTION INTRAVENOUS PRN
Status: DISCONTINUED | OUTPATIENT
Start: 2023-07-17 | End: 2023-07-19 | Stop reason: HOSPADM

## 2023-07-17 RX ORDER — FERROUS SULFATE 325(65) MG
325 TABLET ORAL 2 TIMES DAILY WITH MEALS
Status: DISCONTINUED | OUTPATIENT
Start: 2023-07-17 | End: 2023-07-19 | Stop reason: HOSPADM

## 2023-07-17 RX ORDER — IODIXANOL 320 MG/ML
INJECTION, SOLUTION INTRAVASCULAR PRN
Status: DISCONTINUED | OUTPATIENT
Start: 2023-07-17 | End: 2023-07-17 | Stop reason: HOSPADM

## 2023-07-17 RX ORDER — TRAMADOL HYDROCHLORIDE 50 MG/1
50 TABLET ORAL EVERY 6 HOURS PRN
Status: DISCONTINUED | OUTPATIENT
Start: 2023-07-17 | End: 2023-07-19 | Stop reason: HOSPADM

## 2023-07-17 RX ORDER — DEXTROSE AND SODIUM CHLORIDE 5; .45 G/100ML; G/100ML
INJECTION, SOLUTION INTRAVENOUS CONTINUOUS PRN
Status: DISCONTINUED | OUTPATIENT
Start: 2023-07-17 | End: 2023-07-19 | Stop reason: HOSPADM

## 2023-07-17 RX ORDER — HYDRALAZINE HYDROCHLORIDE 20 MG/ML
5 INJECTION INTRAMUSCULAR; INTRAVENOUS EVERY 10 MIN PRN
Status: DISCONTINUED | OUTPATIENT
Start: 2023-07-17 | End: 2023-07-17 | Stop reason: HOSPADM

## 2023-07-17 RX ORDER — CLOPIDOGREL BISULFATE 75 MG/1
75 TABLET ORAL DAILY
Status: DISCONTINUED | OUTPATIENT
Start: 2023-07-17 | End: 2023-07-19 | Stop reason: HOSPADM

## 2023-07-17 RX ORDER — ONDANSETRON 2 MG/ML
4 INJECTION INTRAMUSCULAR; INTRAVENOUS EVERY 12 HOURS PRN
Status: DISCONTINUED | OUTPATIENT
Start: 2023-07-17 | End: 2023-07-19 | Stop reason: HOSPADM

## 2023-07-17 RX ORDER — 0.9 % SODIUM CHLORIDE 0.9 %
2 VIAL (ML) INJECTION EVERY 12 HOURS SCHEDULED
Status: DISCONTINUED | OUTPATIENT
Start: 2023-07-17 | End: 2023-07-17 | Stop reason: HOSPADM

## 2023-07-17 RX ORDER — ONDANSETRON 2 MG/ML
INJECTION INTRAMUSCULAR; INTRAVENOUS
Status: COMPLETED
Start: 2023-07-17 | End: 2023-07-17

## 2023-07-17 RX ORDER — FUROSEMIDE 20 MG/1
20 TABLET ORAL DAILY
Status: DISCONTINUED | OUTPATIENT
Start: 2023-07-17 | End: 2023-07-19 | Stop reason: HOSPADM

## 2023-07-17 RX ORDER — FUROSEMIDE 10 MG/ML
INJECTION INTRAMUSCULAR; INTRAVENOUS PRN
Status: DISCONTINUED | OUTPATIENT
Start: 2023-07-17 | End: 2023-07-17

## 2023-07-17 RX ORDER — ACETAMINOPHEN 325 MG/1
650 TABLET ORAL
Status: ACTIVE | OUTPATIENT
Start: 2023-07-17 | End: 2023-07-18

## 2023-07-17 RX ORDER — LIDOCAINE HYDROCHLORIDE 20 MG/ML
INJECTION, SOLUTION EPIDURAL; INFILTRATION; INTRACAUDAL; PERINEURAL PRN
Status: DISCONTINUED | OUTPATIENT
Start: 2023-07-17 | End: 2023-07-17 | Stop reason: HOSPADM

## 2023-07-17 RX ORDER — TIZANIDINE 4 MG/1
4 TABLET ORAL EVERY 8 HOURS PRN
Status: DISCONTINUED | OUTPATIENT
Start: 2023-07-17 | End: 2023-07-19 | Stop reason: HOSPADM

## 2023-07-17 RX ORDER — ASPIRIN 81 MG/1
81 TABLET, CHEWABLE ORAL DAILY
Status: DISCONTINUED | OUTPATIENT
Start: 2023-07-17 | End: 2023-07-19 | Stop reason: HOSPADM

## 2023-07-17 RX ADMIN — SODIUM CHLORIDE: 9 INJECTION, SOLUTION INTRAVENOUS at 09:51

## 2023-07-17 RX ADMIN — HYDRALAZINE HYDROCHLORIDE 5 MG: 20 INJECTION INTRAMUSCULAR; INTRAVENOUS at 12:08

## 2023-07-17 RX ADMIN — FENTANYL CITRATE 25 MCG: 50 INJECTION INTRAMUSCULAR; INTRAVENOUS at 12:28

## 2023-07-17 RX ADMIN — FENTANYL CITRATE 25 MCG: 50 INJECTION, SOLUTION INTRAMUSCULAR; INTRAVENOUS at 10:01

## 2023-07-17 RX ADMIN — TIZANIDINE 4 MG: 4 TABLET ORAL at 17:06

## 2023-07-17 RX ADMIN — PROPOFOL 150 MCG/KG/MIN: 10 INJECTION, EMULSION INTRAVENOUS at 10:06

## 2023-07-17 RX ADMIN — TRAMADOL HYDROCHLORIDE 50 MG: 50 TABLET, COATED ORAL at 13:14

## 2023-07-17 RX ADMIN — FUROSEMIDE 20 MG: 10 INJECTION, SOLUTION INTRAMUSCULAR; INTRAVENOUS at 16:21

## 2023-07-17 RX ADMIN — HYDRALAZINE HYDROCHLORIDE 5 MG: 20 INJECTION INTRAMUSCULAR; INTRAVENOUS at 11:58

## 2023-07-17 RX ADMIN — ASPIRIN 81 MG CHEWABLE TABLET 81 MG: 81 TABLET CHEWABLE at 16:20

## 2023-07-17 RX ADMIN — VANCOMYCIN HYDROCHLORIDE 1000 MG: 1 INJECTION, POWDER, LYOPHILIZED, FOR SOLUTION INTRAVENOUS at 18:14

## 2023-07-17 RX ADMIN — FENTANYL CITRATE 25 MCG: 50 INJECTION INTRAMUSCULAR; INTRAVENOUS at 12:18

## 2023-07-17 RX ADMIN — PROTAMINE SULFATE 50 MG: 10 INJECTION, SOLUTION INTRAVENOUS at 10:59

## 2023-07-17 RX ADMIN — HEPARIN SODIUM 4000 UNITS: 1000 INJECTION, SOLUTION INTRAVENOUS; SUBCUTANEOUS at 10:39

## 2023-07-17 RX ADMIN — FERROUS SULFATE TAB 325 MG (65 MG ELEMENTAL FE) 325 MG: 325 (65 FE) TAB at 17:06

## 2023-07-17 RX ADMIN — AZTREONAM 2000 MG: 2 INJECTION, POWDER, LYOPHILIZED, FOR SOLUTION INTRAMUSCULAR; INTRAVENOUS at 17:18

## 2023-07-17 RX ADMIN — ONDANSETRON 4 MG: 2 INJECTION INTRAMUSCULAR; INTRAVENOUS at 17:12

## 2023-07-17 RX ADMIN — ONDANSETRON 4 MG: 2 INJECTION INTRAMUSCULAR; INTRAVENOUS at 17:11

## 2023-07-17 RX ADMIN — TRAMADOL HYDROCHLORIDE 50 MG: 50 TABLET, COATED ORAL at 20:11

## 2023-07-17 RX ADMIN — FUROSEMIDE 20 MG: 10 INJECTION, SOLUTION INTRAMUSCULAR; INTRAVENOUS at 10:56

## 2023-07-17 RX ADMIN — CLOPIDOGREL BISULFATE 75 MG: 75 TABLET, FILM COATED ORAL at 16:21

## 2023-07-17 SDOH — ECONOMIC STABILITY: HOUSING INSECURITY: WHAT IS YOUR LIVING SITUATION TODAY?: ALONE

## 2023-07-17 SDOH — SOCIAL STABILITY: SOCIAL NETWORK
HOW OFTEN DO YOU SEE OR TALK TO PEOPLE THAT YOU CARE ABOUT AND FEEL CLOSE TO? (FOR EXAMPLE: TALKING TO FRIENDS ON THE PHONE, VISITING FRIENDS OR FAMILY, GOING TO CHURCH OR CLUB MEETINGS): 5 OR MORE TIMES A WEEK

## 2023-07-17 SDOH — ECONOMIC STABILITY: HOUSING INSECURITY: WHAT IS YOUR LIVING SITUATION TODAY?: HOUSE

## 2023-07-17 SDOH — SOCIAL STABILITY: SOCIAL NETWORK: SUPPORT SYSTEMS: CHILDREN

## 2023-07-17 SDOH — HEALTH STABILITY: PHYSICAL HEALTH: DO YOU HAVE DIFFICULTY DRESSING OR BATHING?: NO

## 2023-07-17 SDOH — ECONOMIC STABILITY: HOUSING INSECURITY: ARE YOU WORRIED ABOUT LOSING YOUR HOUSING?: NO

## 2023-07-17 SDOH — ECONOMIC STABILITY: FOOD INSECURITY: HOW OFTEN IN THE PAST 12 MONTHS WERE YOU WORRIED OR STRESSED ABOUT HAVING ENOUGH MONEY TO BUY NUTRITIOUS MEALS?: NEVER

## 2023-07-17 SDOH — ECONOMIC STABILITY: GENERAL

## 2023-07-17 SDOH — HEALTH STABILITY: GENERAL: BECAUSE OF A PHYSICAL, MENTAL, OR EMOTIONAL CONDITION, DO YOU HAVE DIFFICULTY DOING ERRANDS ALONE?: NO

## 2023-07-17 SDOH — HEALTH STABILITY: PHYSICAL HEALTH: DO YOU HAVE SERIOUS DIFFICULTY WALKING OR CLIMBING STAIRS?: NO

## 2023-07-17 ASSESSMENT — ACTIVITIES OF DAILY LIVING (ADL)
ADL_SHORT_OF_BREATH: NO
RECENT_DECLINE_ADL: NO
ADL_BEFORE_ADMISSION: INDEPENDENT
ADL_SCORE: 12

## 2023-07-17 ASSESSMENT — LIFESTYLE VARIABLES
AUDIT-C TOTAL SCORE: 0
HOW OFTEN DO YOU HAVE A DRINK CONTAINING ALCOHOL: NEVER
HOW MANY STANDARD DRINKS CONTAINING ALCOHOL DO YOU HAVE ON A TYPICAL DAY: 0,1 OR 2
ALCOHOL_USE_STATUS: NO OR LOW RISK WITH VALIDATED TOOL
HOW OFTEN DO YOU HAVE 6 OR MORE DRINKS ON ONE OCCASION: NEVER

## 2023-07-17 ASSESSMENT — PAIN DESCRIPTION - PAIN TYPE
TYPE: ACUTE PAIN

## 2023-07-17 ASSESSMENT — PATIENT HEALTH QUESTIONNAIRE - PHQ9
SUM OF ALL RESPONSES TO PHQ9 QUESTIONS 1 AND 2: 0
2. FEELING DOWN, DEPRESSED OR HOPELESS: NOT AT ALL
SUM OF ALL RESPONSES TO PHQ9 QUESTIONS 1 AND 2: 0
1. LITTLE INTEREST OR PLEASURE IN DOING THINGS: NOT AT ALL
CLINICAL INTERPRETATION OF PHQ2 SCORE: NO FURTHER SCREENING NEEDED
IS PATIENT ABLE TO COMPLETE PHQ2 OR PHQ9: NO, DEFER TO LATER TIME
IS PATIENT ABLE TO COMPLETE PHQ2 OR PHQ9: YES

## 2023-07-17 ASSESSMENT — PAIN SCALES - GENERAL
PAINLEVEL_OUTOF10: 0
PAINLEVEL_OUTOF10: 6
PAINLEVEL_OUTOF10: 4
PAINLEVEL_OUTOF10: 0
PAINLEVEL_OUTOF10: 0
PAINLEVEL_OUTOF10: 7
PAINLEVEL_OUTOF10: 0
PAINLEVEL_OUTOF10: 7
PAINLEVEL_OUTOF10: 7
PAINLEVEL_OUTOF10: 0
PAINLEVEL_OUTOF10: 2
PAINLEVEL_OUTOF10: 6
PAINLEVEL_OUTOF10: 7
PAINLEVEL_OUTOF10: 0

## 2023-07-17 ASSESSMENT — COLUMBIA-SUICIDE SEVERITY RATING SCALE - C-SSRS
2. HAVE YOU ACTUALLY HAD ANY THOUGHTS OF KILLING YOURSELF?: NO
1. WITHIN THE PAST MONTH, HAVE YOU WISHED YOU WERE DEAD OR WISHED YOU COULD GO TO SLEEP AND NOT WAKE UP?: NO
IS THE PATIENT ABLE TO COMPLETE C-SSRS: YES
6. HAVE YOU EVER DONE ANYTHING, STARTED TO DO ANYTHING, OR PREPARED TO DO ANYTHING TO END YOUR LIFE?: NO

## 2023-07-17 ASSESSMENT — ENCOUNTER SYMPTOMS: EXERCISE TOLERANCE: GOOD (>4 METS)

## 2023-07-18 LAB
ANION GAP SERPL CALC-SCNC: 8 MMOL/L (ref 7–19)
ATRIAL RATE (BPM): 85
BUN SERPL-MCNC: 19 MG/DL (ref 6–20)
BUN/CREAT SERPL: 38 (ref 7–25)
CALCIUM SERPL-MCNC: 8.6 MG/DL (ref 8.4–10.2)
CHLORIDE SERPL-SCNC: 106 MMOL/L (ref 97–110)
CO2 SERPL-SCNC: 29 MMOL/L (ref 21–32)
CREAT SERPL-MCNC: 0.5 MG/DL (ref 0.51–0.95)
DEPRECATED RDW RBC: 60.6 FL (ref 39–50)
ERYTHROCYTE [DISTWIDTH] IN BLOOD: 17.4 % (ref 11–15)
FASTING DURATION TIME PATIENT: ABNORMAL H
FOLATE SERPL-MCNC: >24 NG/ML
GFR SERPLBLD BASED ON 1.73 SQ M-ARVRAT: 88 ML/MIN
GLUCOSE SERPL-MCNC: 122 MG/DL (ref 70–99)
HCT VFR BLD CALC: 30.9 % (ref 36–46.5)
HGB BLD-MCNC: 9.3 G/DL (ref 12–15.5)
MCH RBC QN AUTO: 28.5 PG (ref 26–34)
MCHC RBC AUTO-ENTMCNC: 30.1 G/DL (ref 32–36.5)
MCV RBC AUTO: 94.8 FL (ref 78–100)
NRBC BLD MANUAL-RTO: 0 /100 WBC
P AXIS (DEGREES): 67
PLATELET # BLD AUTO: 254 K/MCL (ref 140–450)
POTASSIUM SERPL-SCNC: 3.1 MMOL/L (ref 3.4–5.1)
PR-INTERVAL (MSEC): 164
QRS-INTERVAL (MSEC): 112
QT-INTERVAL (MSEC): 382
QTC: 454
R AXIS (DEGREES): 58
RBC # BLD: 3.26 MIL/MCL (ref 4–5.2)
REPORT TEXT: NORMAL
SODIUM SERPL-SCNC: 140 MMOL/L (ref 135–145)
T AXIS (DEGREES): 76
VENTRICULAR RATE EKG/MIN (BPM): 85
VIT B12 SERPL-MCNC: 451 PG/ML (ref 211–911)
WBC # BLD: 10.5 K/MCL (ref 4.2–11)

## 2023-07-18 PROCEDURE — 10002807 HB RX 258

## 2023-07-18 PROCEDURE — 10002803 HB RX 637: Performed by: HOSPITALIST

## 2023-07-18 PROCEDURE — 80048 BASIC METABOLIC PNL TOTAL CA: CPT

## 2023-07-18 PROCEDURE — 10004651 HB RX, NO CHARGE ITEM

## 2023-07-18 PROCEDURE — 10002803 HB RX 637

## 2023-07-18 PROCEDURE — 10002800 HB RX 250 W HCPCS

## 2023-07-18 PROCEDURE — 93005 ELECTROCARDIOGRAM TRACING: CPT

## 2023-07-18 PROCEDURE — 10002803 HB RX 637: Performed by: ANESTHESIOLOGY

## 2023-07-18 PROCEDURE — 85027 COMPLETE CBC AUTOMATED: CPT

## 2023-07-18 PROCEDURE — 36415 COLL VENOUS BLD VENIPUNCTURE: CPT

## 2023-07-18 PROCEDURE — 10002800 HB RX 250 W HCPCS: Performed by: HOSPITALIST

## 2023-07-18 PROCEDURE — 10006031 HB ROOM CHARGE TELEMETRY

## 2023-07-18 RX ADMIN — FERROUS SULFATE TAB 325 MG (65 MG ELEMENTAL FE) 325 MG: 325 (65 FE) TAB at 17:13

## 2023-07-18 RX ADMIN — TIZANIDINE 4 MG: 4 TABLET ORAL at 20:42

## 2023-07-18 RX ADMIN — FUROSEMIDE 20 MG: 20 TABLET ORAL at 08:31

## 2023-07-18 RX ADMIN — TRAMADOL HYDROCHLORIDE 50 MG: 50 TABLET, COATED ORAL at 06:34

## 2023-07-18 RX ADMIN — TRAMADOL HYDROCHLORIDE 50 MG: 50 TABLET, COATED ORAL at 14:00

## 2023-07-18 RX ADMIN — IRON SUCROSE 200 MG: 20 INJECTION, SOLUTION INTRAVENOUS at 12:21

## 2023-07-18 RX ADMIN — CLOPIDOGREL BISULFATE 75 MG: 75 TABLET, FILM COATED ORAL at 08:31

## 2023-07-18 RX ADMIN — TIZANIDINE 4 MG: 4 TABLET ORAL at 13:59

## 2023-07-18 RX ADMIN — AZTREONAM 2000 MG: 2 INJECTION, POWDER, LYOPHILIZED, FOR SOLUTION INTRAMUSCULAR; INTRAVENOUS at 06:32

## 2023-07-18 RX ADMIN — ASPIRIN 81 MG CHEWABLE TABLET 81 MG: 81 TABLET CHEWABLE at 08:31

## 2023-07-18 RX ADMIN — TIZANIDINE 4 MG: 4 TABLET ORAL at 06:34

## 2023-07-18 RX ADMIN — FERROUS SULFATE TAB 325 MG (65 MG ELEMENTAL FE) 325 MG: 325 (65 FE) TAB at 08:31

## 2023-07-18 RX ADMIN — POTASSIUM CHLORIDE 40 MEQ: 1.5 POWDER, FOR SOLUTION ORAL at 15:59

## 2023-07-18 RX ADMIN — TRAMADOL HYDROCHLORIDE 50 MG: 50 TABLET, COATED ORAL at 20:42

## 2023-07-18 ASSESSMENT — PAIN SCALES - GENERAL
PAINLEVEL_OUTOF10: 7
PAINLEVEL_OUTOF10: 0
PAINLEVEL_OUTOF10: 5
PAINLEVEL_OUTOF10: 0
PAINLEVEL_OUTOF10: 2
PAINLEVEL_OUTOF10: 0
PAINLEVEL_OUTOF10: 10
PAINLEVEL_OUTOF10: 2
PAINLEVEL_OUTOF10: 6

## 2023-07-19 VITALS
DIASTOLIC BLOOD PRESSURE: 47 MMHG | HEART RATE: 75 BPM | TEMPERATURE: 97.9 F | HEIGHT: 60 IN | OXYGEN SATURATION: 99 % | RESPIRATION RATE: 16 BRPM | SYSTOLIC BLOOD PRESSURE: 119 MMHG | BODY MASS INDEX: 18.83 KG/M2 | WEIGHT: 95.9 LBS

## 2023-07-19 LAB
ATRIAL RATE (BPM): 81
HGB BLD-MCNC: 9.8 G/DL (ref 12–15.5)
P AXIS (DEGREES): 78
PR-INTERVAL (MSEC): 152
QRS-INTERVAL (MSEC): 102
QT-INTERVAL (MSEC): 372
QTC: 432
R AXIS (DEGREES): 54
REPORT TEXT: NORMAL
T AXIS (DEGREES): 89
VENTRICULAR RATE EKG/MIN (BPM): 81

## 2023-07-19 PROCEDURE — 36415 COLL VENOUS BLD VENIPUNCTURE: CPT | Performed by: HOSPITALIST

## 2023-07-19 PROCEDURE — 10002803 HB RX 637

## 2023-07-19 PROCEDURE — 10002803 HB RX 637: Performed by: HOSPITALIST

## 2023-07-19 PROCEDURE — 10004651 HB RX, NO CHARGE ITEM

## 2023-07-19 PROCEDURE — 93005 ELECTROCARDIOGRAM TRACING: CPT

## 2023-07-19 PROCEDURE — 10002803 HB RX 637: Performed by: ANESTHESIOLOGY

## 2023-07-19 PROCEDURE — 10002800 HB RX 250 W HCPCS: Performed by: HOSPITALIST

## 2023-07-19 PROCEDURE — 85018 HEMOGLOBIN: CPT | Performed by: HOSPITALIST

## 2023-07-19 RX ADMIN — TIZANIDINE 4 MG: 4 TABLET ORAL at 07:33

## 2023-07-19 RX ADMIN — TRAMADOL HYDROCHLORIDE 50 MG: 50 TABLET, COATED ORAL at 07:33

## 2023-07-19 RX ADMIN — FERROUS SULFATE TAB 325 MG (65 MG ELEMENTAL FE) 325 MG: 325 (65 FE) TAB at 08:33

## 2023-07-19 RX ADMIN — ASPIRIN 81 MG CHEWABLE TABLET 81 MG: 81 TABLET CHEWABLE at 08:33

## 2023-07-19 RX ADMIN — CLOPIDOGREL BISULFATE 75 MG: 75 TABLET, FILM COATED ORAL at 08:33

## 2023-07-19 RX ADMIN — IRON SUCROSE 200 MG: 20 INJECTION, SOLUTION INTRAVENOUS at 08:33

## 2023-07-19 ASSESSMENT — PAIN SCALES - GENERAL
PAINLEVEL_OUTOF10: 6
PAINLEVEL_OUTOF10: 2

## 2023-07-22 ENCOUNTER — APPOINTMENT (OUTPATIENT)
Dept: ULTRASOUND IMAGING | Age: 88
End: 2023-07-22
Attending: NURSE PRACTITIONER
Payer: MEDICARE

## 2023-07-22 ENCOUNTER — HOSPITAL ENCOUNTER (OUTPATIENT)
Age: 88
Discharge: HOME | End: 2023-07-22
Payer: MEDICARE

## 2023-07-22 VITALS
TEMPERATURE: 98 F | RESPIRATION RATE: 18 BRPM | OXYGEN SATURATION: 100 % | HEART RATE: 87 BPM | DIASTOLIC BLOOD PRESSURE: 44 MMHG | SYSTOLIC BLOOD PRESSURE: 108 MMHG

## 2023-07-22 DIAGNOSIS — I80.9 THROMBOPHLEBITIS: Primary | ICD-10-CM

## 2023-07-22 PROCEDURE — 93971 EXTREMITY STUDY: CPT | Performed by: NURSE PRACTITIONER

## 2023-07-22 PROCEDURE — 99214 OFFICE O/P EST MOD 30 MIN: CPT

## 2023-07-22 RX ORDER — SULFAMETHOXAZOLE AND TRIMETHOPRIM 800; 160 MG/1; MG/1
1 TABLET ORAL 2 TIMES DAILY
Qty: 10 TABLET | Refills: 0 | Status: SHIPPED | OUTPATIENT
Start: 2023-07-22 | End: 2023-07-27

## 2023-07-22 NOTE — DISCHARGE INSTRUCTIONS
Elevate the left arm. Apply warm compresses. Tylenol as needed for any pain. Take the Bactrim as prescribed. Follow-up with your doctor early next week. Return for any concerns.

## 2023-07-22 NOTE — ED INITIAL ASSESSMENT (HPI)
Patient arrived ambulatory to room c/o redness to the left arm. Patient states she had an iv at the site while admitted to the hospital. Redness started yesterday. No fevers. No drainage.

## 2023-09-20 ENCOUNTER — ORDER TRANSCRIPTION (OUTPATIENT)
Dept: CARDIAC REHAB | Facility: HOSPITAL | Age: 88
End: 2023-09-20

## 2023-09-20 DIAGNOSIS — Z95.2 S/P TAVR (TRANSCATHETER AORTIC VALVE REPLACEMENT): Primary | ICD-10-CM

## 2023-09-26 ENCOUNTER — CARDPULM VISIT (OUTPATIENT)
Dept: CARDIAC REHAB | Facility: HOSPITAL | Age: 88
End: 2023-09-26
Attending: INTERNAL MEDICINE
Payer: MEDICARE

## 2023-09-26 DIAGNOSIS — Z95.2 S/P TAVR (TRANSCATHETER AORTIC VALVE REPLACEMENT): Primary | ICD-10-CM

## 2023-10-09 ENCOUNTER — APPOINTMENT (OUTPATIENT)
Dept: CARDIAC REHAB | Facility: HOSPITAL | Age: 88
End: 2023-10-09
Attending: INTERNAL MEDICINE
Payer: MEDICARE

## 2023-10-11 ENCOUNTER — APPOINTMENT (OUTPATIENT)
Dept: CARDIAC REHAB | Facility: HOSPITAL | Age: 88
End: 2023-10-11
Attending: INTERNAL MEDICINE
Payer: MEDICARE

## 2023-10-16 ENCOUNTER — CARDPULM VISIT (OUTPATIENT)
Dept: CARDIAC REHAB | Facility: HOSPITAL | Age: 88
End: 2023-10-16
Attending: INTERNAL MEDICINE
Payer: MEDICARE

## 2023-10-16 PROCEDURE — 93798 PHYS/QHP OP CAR RHAB W/ECG: CPT

## 2023-10-18 ENCOUNTER — APPOINTMENT (OUTPATIENT)
Dept: CARDIAC REHAB | Facility: HOSPITAL | Age: 88
End: 2023-10-18
Attending: INTERNAL MEDICINE
Payer: MEDICARE

## 2023-10-23 ENCOUNTER — APPOINTMENT (OUTPATIENT)
Dept: CARDIAC REHAB | Facility: HOSPITAL | Age: 88
End: 2023-10-23
Attending: INTERNAL MEDICINE
Payer: MEDICARE

## 2023-10-25 ENCOUNTER — APPOINTMENT (OUTPATIENT)
Dept: CARDIAC REHAB | Facility: HOSPITAL | Age: 88
End: 2023-10-25
Attending: INTERNAL MEDICINE
Payer: MEDICARE

## 2023-10-30 ENCOUNTER — APPOINTMENT (OUTPATIENT)
Dept: CARDIAC REHAB | Facility: HOSPITAL | Age: 88
End: 2023-10-30
Attending: INTERNAL MEDICINE
Payer: MEDICARE

## 2023-11-01 ENCOUNTER — APPOINTMENT (OUTPATIENT)
Dept: CARDIAC REHAB | Facility: HOSPITAL | Age: 88
End: 2023-11-01
Attending: INTERNAL MEDICINE
Payer: MEDICARE

## 2023-11-06 ENCOUNTER — APPOINTMENT (OUTPATIENT)
Dept: CARDIAC REHAB | Facility: HOSPITAL | Age: 88
End: 2023-11-06
Attending: INTERNAL MEDICINE
Payer: MEDICARE

## 2023-11-08 ENCOUNTER — APPOINTMENT (OUTPATIENT)
Dept: CARDIAC REHAB | Facility: HOSPITAL | Age: 88
End: 2023-11-08
Attending: INTERNAL MEDICINE
Payer: MEDICARE

## 2023-11-13 ENCOUNTER — APPOINTMENT (OUTPATIENT)
Dept: CARDIAC REHAB | Facility: HOSPITAL | Age: 88
End: 2023-11-13
Attending: INTERNAL MEDICINE
Payer: MEDICARE

## 2023-11-15 ENCOUNTER — APPOINTMENT (OUTPATIENT)
Dept: CARDIAC REHAB | Facility: HOSPITAL | Age: 88
End: 2023-11-15
Attending: INTERNAL MEDICINE
Payer: MEDICARE

## 2023-11-20 ENCOUNTER — APPOINTMENT (OUTPATIENT)
Dept: CARDIAC REHAB | Facility: HOSPITAL | Age: 88
End: 2023-11-20
Attending: INTERNAL MEDICINE
Payer: MEDICARE

## 2023-11-22 ENCOUNTER — APPOINTMENT (OUTPATIENT)
Dept: CARDIAC REHAB | Facility: HOSPITAL | Age: 88
End: 2023-11-22
Attending: INTERNAL MEDICINE
Payer: MEDICARE

## 2023-11-27 ENCOUNTER — APPOINTMENT (OUTPATIENT)
Dept: CARDIAC REHAB | Facility: HOSPITAL | Age: 88
End: 2023-11-27
Attending: INTERNAL MEDICINE
Payer: MEDICARE

## 2023-11-29 ENCOUNTER — APPOINTMENT (OUTPATIENT)
Dept: CARDIAC REHAB | Facility: HOSPITAL | Age: 88
End: 2023-11-29
Attending: INTERNAL MEDICINE
Payer: MEDICARE

## 2023-12-04 ENCOUNTER — APPOINTMENT (OUTPATIENT)
Dept: CARDIAC REHAB | Facility: HOSPITAL | Age: 88
End: 2023-12-04
Attending: INTERNAL MEDICINE
Payer: MEDICARE

## 2023-12-06 ENCOUNTER — APPOINTMENT (OUTPATIENT)
Dept: CARDIAC REHAB | Facility: HOSPITAL | Age: 88
End: 2023-12-06
Attending: INTERNAL MEDICINE
Payer: MEDICARE

## 2023-12-11 ENCOUNTER — APPOINTMENT (OUTPATIENT)
Dept: CARDIAC REHAB | Facility: HOSPITAL | Age: 88
End: 2023-12-11
Attending: INTERNAL MEDICINE
Payer: MEDICARE

## 2023-12-13 ENCOUNTER — APPOINTMENT (OUTPATIENT)
Dept: CARDIAC REHAB | Facility: HOSPITAL | Age: 88
End: 2023-12-13
Attending: INTERNAL MEDICINE
Payer: MEDICARE

## 2023-12-18 ENCOUNTER — APPOINTMENT (OUTPATIENT)
Dept: CARDIAC REHAB | Facility: HOSPITAL | Age: 88
End: 2023-12-18
Attending: INTERNAL MEDICINE
Payer: MEDICARE

## 2023-12-20 ENCOUNTER — APPOINTMENT (OUTPATIENT)
Dept: CARDIAC REHAB | Facility: HOSPITAL | Age: 88
End: 2023-12-20
Attending: INTERNAL MEDICINE
Payer: MEDICARE

## 2023-12-25 ENCOUNTER — APPOINTMENT (OUTPATIENT)
Dept: CARDIAC REHAB | Facility: HOSPITAL | Age: 88
End: 2023-12-25
Attending: INTERNAL MEDICINE
Payer: MEDICARE

## 2023-12-27 ENCOUNTER — APPOINTMENT (OUTPATIENT)
Dept: CARDIAC REHAB | Facility: HOSPITAL | Age: 88
End: 2023-12-27
Attending: INTERNAL MEDICINE
Payer: MEDICARE

## 2024-01-01 ENCOUNTER — APPOINTMENT (OUTPATIENT)
Dept: CARDIAC REHAB | Facility: HOSPITAL | Age: 89
End: 2024-01-01
Attending: INTERNAL MEDICINE
Payer: MEDICARE

## 2024-01-03 ENCOUNTER — APPOINTMENT (OUTPATIENT)
Dept: CARDIAC REHAB | Facility: HOSPITAL | Age: 89
End: 2024-01-03
Attending: INTERNAL MEDICINE
Payer: MEDICARE

## 2024-01-08 ENCOUNTER — APPOINTMENT (OUTPATIENT)
Dept: CARDIAC REHAB | Facility: HOSPITAL | Age: 89
End: 2024-01-08
Attending: INTERNAL MEDICINE
Payer: MEDICARE

## 2024-01-10 ENCOUNTER — APPOINTMENT (OUTPATIENT)
Dept: CARDIAC REHAB | Facility: HOSPITAL | Age: 89
End: 2024-01-10
Attending: INTERNAL MEDICINE
Payer: MEDICARE

## 2024-01-15 ENCOUNTER — APPOINTMENT (OUTPATIENT)
Dept: CARDIAC REHAB | Facility: HOSPITAL | Age: 89
End: 2024-01-15
Attending: INTERNAL MEDICINE
Payer: MEDICARE

## 2024-01-17 ENCOUNTER — APPOINTMENT (OUTPATIENT)
Dept: CARDIAC REHAB | Facility: HOSPITAL | Age: 89
End: 2024-01-17
Attending: INTERNAL MEDICINE
Payer: MEDICARE

## 2024-01-22 ENCOUNTER — APPOINTMENT (OUTPATIENT)
Dept: CARDIAC REHAB | Facility: HOSPITAL | Age: 89
End: 2024-01-22
Attending: INTERNAL MEDICINE
Payer: MEDICARE

## 2024-01-24 ENCOUNTER — APPOINTMENT (OUTPATIENT)
Dept: CARDIAC REHAB | Facility: HOSPITAL | Age: 89
End: 2024-01-24
Attending: INTERNAL MEDICINE
Payer: MEDICARE

## 2024-01-29 ENCOUNTER — APPOINTMENT (OUTPATIENT)
Dept: CARDIAC REHAB | Facility: HOSPITAL | Age: 89
End: 2024-01-29
Attending: INTERNAL MEDICINE
Payer: MEDICARE

## 2024-01-31 ENCOUNTER — APPOINTMENT (OUTPATIENT)
Dept: CARDIAC REHAB | Facility: HOSPITAL | Age: 89
End: 2024-01-31
Attending: INTERNAL MEDICINE
Payer: MEDICARE

## 2024-02-05 ENCOUNTER — APPOINTMENT (OUTPATIENT)
Dept: CARDIAC REHAB | Facility: HOSPITAL | Age: 89
End: 2024-02-05
Attending: INTERNAL MEDICINE
Payer: MEDICARE

## 2024-02-07 ENCOUNTER — APPOINTMENT (OUTPATIENT)
Dept: CARDIAC REHAB | Facility: HOSPITAL | Age: 89
End: 2024-02-07
Attending: INTERNAL MEDICINE
Payer: MEDICARE

## 2024-02-12 ENCOUNTER — APPOINTMENT (OUTPATIENT)
Dept: CARDIAC REHAB | Facility: HOSPITAL | Age: 89
End: 2024-02-12
Attending: INTERNAL MEDICINE
Payer: MEDICARE

## 2024-02-14 ENCOUNTER — APPOINTMENT (OUTPATIENT)
Dept: CARDIAC REHAB | Facility: HOSPITAL | Age: 89
End: 2024-02-14
Attending: INTERNAL MEDICINE
Payer: MEDICARE

## 2024-02-19 ENCOUNTER — APPOINTMENT (OUTPATIENT)
Dept: CARDIAC REHAB | Facility: HOSPITAL | Age: 89
End: 2024-02-19
Attending: INTERNAL MEDICINE
Payer: MEDICARE

## 2024-02-21 ENCOUNTER — APPOINTMENT (OUTPATIENT)
Dept: CARDIAC REHAB | Facility: HOSPITAL | Age: 89
End: 2024-02-21
Attending: INTERNAL MEDICINE
Payer: MEDICARE

## 2024-02-26 ENCOUNTER — APPOINTMENT (OUTPATIENT)
Dept: CARDIAC REHAB | Facility: HOSPITAL | Age: 89
End: 2024-02-26
Attending: INTERNAL MEDICINE
Payer: MEDICARE

## 2024-02-28 ENCOUNTER — APPOINTMENT (OUTPATIENT)
Dept: CARDIAC REHAB | Facility: HOSPITAL | Age: 89
End: 2024-02-28
Attending: INTERNAL MEDICINE
Payer: MEDICARE

## 2024-03-04 ENCOUNTER — APPOINTMENT (OUTPATIENT)
Dept: CARDIAC REHAB | Facility: HOSPITAL | Age: 89
End: 2024-03-04
Attending: INTERNAL MEDICINE
Payer: MEDICARE

## 2024-03-06 ENCOUNTER — APPOINTMENT (OUTPATIENT)
Dept: CARDIAC REHAB | Facility: HOSPITAL | Age: 89
End: 2024-03-06
Attending: INTERNAL MEDICINE
Payer: MEDICARE

## 2024-03-11 ENCOUNTER — APPOINTMENT (OUTPATIENT)
Dept: CARDIAC REHAB | Facility: HOSPITAL | Age: 89
End: 2024-03-11
Attending: INTERNAL MEDICINE
Payer: MEDICARE

## 2025-05-14 ENCOUNTER — HOSPITAL ENCOUNTER (OUTPATIENT)
Age: OVER 89
Discharge: HOME | End: 2025-05-14
Attending: STUDENT IN AN ORGANIZED HEALTH CARE EDUCATION/TRAINING PROGRAM
Payer: MEDICARE

## 2025-05-14 VITALS
DIASTOLIC BLOOD PRESSURE: 50 MMHG | RESPIRATION RATE: 18 BRPM | OXYGEN SATURATION: 96 % | SYSTOLIC BLOOD PRESSURE: 170 MMHG | HEART RATE: 90 BPM

## 2025-05-14 DIAGNOSIS — R58 ECCHYMOSIS: ICD-10-CM

## 2025-05-14 DIAGNOSIS — T14.8XXA SKIN AVULSION: ICD-10-CM

## 2025-05-14 DIAGNOSIS — R03.0 ELEVATED BLOOD PRESSURE READING: ICD-10-CM
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
## 2025-05-14 DIAGNOSIS — S51.012A SKIN TEAR OF LEFT ELBOW WITHOUT COMPLICATION, INITIAL ENCOUNTER: Primary | ICD-10-CM

## 2025-05-14 DIAGNOSIS — S59.902A ELBOW INJURY, LEFT, INITIAL ENCOUNTER: ICD-10-CM

## 2025-05-14 PROCEDURE — 99213 OFFICE O/P EST LOW 20 MIN: CPT

## 2025-05-14 PROCEDURE — 99214 OFFICE O/P EST MOD 30 MIN: CPT

## 2025-05-14 RX ORDER — DOXYCYCLINE 100 MG/1
100 CAPSULE ORAL 2 TIMES DAILY
Qty: 10 CAPSULE | Refills: 0 | Status: SHIPPED | OUTPATIENT
Start: 2025-05-14 | End: 2025-05-19

## 2025-05-14 NOTE — ED PROVIDER NOTES
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  Patient reports she had a mechanical fall in her garage 2 days ago, states she hit her head, no LOC, no headaches, no vision changes, she was able to get herself up in 10 minutes, there was bleeding from the left elbow which has been ongoing, she was able to apply Band-Aids but states she could not see the area well to adequately apply the bandaids.  She denies any fevers.  She is unsure of her antibiotic allergies, but per chart review she has allergies listed to Keflex, cefadroxil, clindamycin, Augmentin, and Bactrim, it does appear per chart review she has previously taken doxycycline for cellulitis in 2017 with no reported intolerance.  Patient reports that her last tetanus was updated 1 month ago.  Also per chart review, she completed doxycycline on 4/3/25 for lower extremity cellulitis with no complications.      Objective:     No pertinent past medical history.            No pertinent past surgical history.              No pertinent social history.            Review of  Systems    Positive for stated complaint: arm injury  Other systems are as noted in HPI.  Constitutional and vital signs reviewed.      All other systems reviewed and negative except as noted above.                  Physical Exam     ED Triage Vitals [05/14/25 1423]   BP (!) 170/50   Pulse 90   Resp 18   Temp    Temp src    SpO2 96 %   O2 Device None (Room air)       Current Vitals:   Vital Signs  BP: (!) 170/50  Pulse: 90  Resp: 18    Oxygen Therapy  SpO2: 96 %  O2 Device: None (Room air)          Physical Exam    General: Awake, alert, comfortable on room air, in no distress, tolerating oral secretions, interactive, uses a cane to ambulate comfortably in and out of the immediate care  Pulmonary: No conversational dyspnea  Cardiac: +2 B/L regular radial pulses  Neuro: CN 2-12 intact, No drift in any extremity, intact sensation and motor function of B/L radial, median, and ulnar nerves, normal B/L finger to nose, symmetrical facial expressions on gross observation  Musculoskeletal: 5/5 B/L  strength and plantarflexion and dorsiflexion, intact active flexion and extension of the left elbow, soft compartments throughout the left upper extremity, no TTP throughout the left elbow, no TTP or step-offs of the B/L clavicles or shoulder girdles  HEENT: PERRL, no direct or consensual photophobia, EOMI, no pain with extraocular muscle movement, no periorbital edema or erythema, nonerythematous and nonedematous intact B/L TMs, no hemotympanum, no erythema or edema or ecchymosis of the B/L mastoid regions, nonerythematous and nonedematous B/L tonsils, no tonsillar exudates, no peritonsillar edema, uvula midline, tolerating oral secretions, normal speech, no submandibular edema, no tenderness to palpation of the scalp with no appreciated hematomas  Skin: Superficial ecchymosis of the left posterior elbow where there is a large skin tear with a skin flap but at the cephalad and caudad aspect there are complete skin avulsions,  2 smaller superficial abrasions just lateral to the skin flap, scant oozing of blood with hemostasis achieved with minimal direct pressure and no rebleeding, no purulent drainage, no surrounding erythema, no fluctuance  Neck: No TTP or step-offs of the midline cervical spine  Psych: Normal mood, normal affect          ED Course   No imaging performed as patient refused CT head and left elbow x-ray    MDM   Patient is awake and alert, comfortable on room air, in no distress, she has a nonfocal neuroexam with normal strength and coordination, but given head trauma 2 days ago in a patient who is over 65 years old by [facility] and [place] head CT rules, did discuss recommendation for advanced imaging with CT to ensure no ICH, patient declines, she is aware of the risks of declining imaging and that we cannot rule out associated life-threatening sequela by exam alone/without advanced imaging, intact active range of motion of the left elbow with no tenderness throughout the left elbow but also discussed x-ray imaging of the left elbow given blunt trauma to the left elbow but patient declines XRay, reports a mechanical fall with no suspicion for ACS, exam is consistent with a large skin tear over the left posterior elbow at the most cephalad and caudal regions there is complete skin avulsion with scant oozing of blood, 2 other superficial areas of abrasions, no sign of cellulitis at this time, neurovascular intact with soft compartments with no sign of compartment syndrome  - Patient reports she had a tetanus shot within the last 2 months, therefore tetanus not updated today  - Patient declined x-ray imaging of the left elbow and advanced imaging of the brain and therefore no imaging was performed today  - Mild oozing stopped readily with mild pressure, hemostasis achieved with no rebleeding  - I personally irrigated the wounds of the left elbow with a total of 1 L sterile normal saline under pressure with nozzle, patient  tolerated with no pain  - I applied 3 Steri-Strips while using sterile gloves to the area of the skin tear/flap where I could still approximate the skin, at the cephalad and caudal regions there is complete skin avulsion which remain open, therefore nonadherent gauze was applied  - Patient recommended to change the nonadherent gauze once in the morning and once at night and more frequently if contaminated or saturated  - I encouraged a wound check in 48 to 72 hours with her primary care physician for reassessment as we did discuss that all wounds especially open wounds are at risk for infection, as well as discussed that once the site begins to scab she can leave the wounds open to air to prevent trapping of moisture and to enhance wound healing, steri strips should fall off on their own, patient prefers to return to the immediate care for wound reassessment  - Patient has extensive antibiotic allergy list to Keflex, cefadroxil, Augmentin, clindamycin, and Bactrim, per chart review she tolerated doxycycline on 4/3/25 for cellulitis, no interactions with her daily medications which were reviewed on the primary care note from 5/8/2025  -No indication for renal dosing with normal creatinine of 0.54 and GFR of 85 on 5/12/2025 on patient's BMP  - To follow-up with her primary care physician regarding elevated blood pressure reading      Medical Decision Making  Amount and/or Complexity of Data Reviewed  External Data Reviewed: labs and notes.     Details: Patient's BMP from 5/12/2025 reviewed as well as patient's primary care notes from 4/3/2025 and 5/8/2025 reviewed    Risk  Prescription drug management.          Disposition and Plan     Clinical Impression:  1. Skin tear of left elbow without complication, initial encounter    2. Fall, initial encounter    3. Elbow injury, left, initial encounter    4. Ecchymosis    5. Skin avulsion    6. Elevated blood pressure reading         Disposition:  Discharge  5/14/2025  2:59  pm    Follow-up:  Marychuy Finnegan MD  [address] [facility]  [id number]  [place] [address]  [phone]    In 2 days            Medications Prescribed:  Current Discharge Medication List            doxycycline 100 MG Oral Cap 10 capsule 0 5/14/2025 5/19/2025   Sig:   Take 1 capsule (100 mg total) by mouth 2 (two) times daily for 5 days.     Route:   Oral

## 2025-05-14 NOTE — DISCHARGE INSTRUCTIONS
You have a skin tear which is very superficial and given the superficial nature and the duration of time since your injury, no sutures were applied, but Steri-Strips were placed to help approximate the wound given you have had ongoing bleeding.  You also had skin avulsions in which the skin has torn off completely leaving open superficial skin.  Therefore, antibiotics have been prescribed to help reduce risk of infection.  However, even in the setting of antibiotics, infections can still develop, and with any new, changing, or progressing signs or symptoms or with any redness or fevers or purulence you should be immediately reassessed.    You did not require a tetanus update and you refused x-ray of the left elbow and CT scan of the brain.    Nonadherent gauze has been placed over the wounds, you should change this once in the morning and once at night and more frequently if saturated or contaminated.  Once the site begins to scab, you should leave open to air so as to prevent trapping moisture and to allow for better wound healing.  Steri-Strips should fall off on their own.    I recommend wound check in 48 hours with your primary care physician for reassessment and for further recommendations.    Your blood pressure was noted to be elevated today, I recommend follow up with your primary care physician for reassessment and for further recommendations.

## 2025-05-14 NOTE — ED INITIAL ASSESSMENT (HPI)
Pt fell in garage 2 days ago injuring left arm.  Pt states hit head but denies loc.  Pt put bandages on arm but has not removed them in 2 days

## 2025-05-14 NOTE — ED QUICK NOTES
Follow up in 48 hours discussed with pt.  Pt states has to come back tomorrow and cannot come back in 48 hours.  MD finney

## 2025-05-15 ENCOUNTER — HOSPITAL ENCOUNTER (OUTPATIENT)
Age: OVER 89
Discharge: HOME | End: 2025-05-15
Payer: MEDICARE

## 2025-05-15 VITALS
HEART RATE: 80 BPM | SYSTOLIC BLOOD PRESSURE: 154 MMHG | RESPIRATION RATE: 16 BRPM | TEMPERATURE: 98 F | OXYGEN SATURATION: 97 % | DIASTOLIC BLOOD PRESSURE: 71 MMHG

## 2025-05-15 DIAGNOSIS — S51.012D SKIN TEAR OF LEFT ELBOW WITHOUT COMPLICATION, SUBSEQUENT ENCOUNTER: ICD-10-CM

## 2025-05-15 DIAGNOSIS — Z51.89 VISIT FOR WOUND CHECK: Primary | ICD-10-CM

## 2025-05-15 PROCEDURE — 99212 OFFICE O/P EST SF 10 MIN: CPT

## 2025-05-15 NOTE — ED PROVIDER NOTES
Patient Seen in: [facility] [place]      History     Chief Complaint   Patient presents with    Wound Care     Stated Complaint: follow up  Subjective:   89-year-old female with claustrophobia, DVT, hyperlipidemia presents from home.  Patient states she is here for a dressing change.  She was seen here yesterday with a left elbow skin tear and claims that she was told to return today for a dressing change.  States she did change the dressing at home.  She has cleaned the wound with peroxide.  She did not apply any ointments.  She has been taking the doxycycline as prescribed.  She has not required any pain medication at home.  She has not scheduled a follow-up with her primary physician.  States her tetanus was recently updated.    The history is provided by the patient. No  was used.     Objective:   Past Medical History:    Claustrophobia    Deep vein thrombosis (HCC)    Right leg    Hyperglycemia    Hyperlipidemia    MENOPAUSE    OSTEOARTHRITIS    back    Urge incontinence            Past Surgical History:   Procedure Laterality Date    Back surgery  4/8/2015    L4-5 microdiskectomy    Cataract  10/1/14    right    Fluor gid & loclzj ndl/cath spi dx/ther njx N/A 1/6/2015    Procedure: LUMBAR EPIDURAL;  Surgeon: Carlos Snowden MD;  Location: [facility]    Fluor gid & loclzj ndl/cath spi dx/ther njx N/A 1/19/2015    Procedure: LUMBAR EPIDURAL;  Surgeon: Carlos Snowden MD;  Location: [facility]    Fluor gid & loclzj ndl/cath spi dx/ther njx N/A 1/28/2015    Procedure: LUMBAR EPIDURAL;  Surgeon: Carlos Snowden MD;  Location: [facility]    Injection, w/wo contrast, dx/therapeutic substance, epidural/subarachnoid; lumbar/sacral N/A 1/6/2015    Procedure: LUMBAR EPIDURAL;  Surgeon: Carlos Snowden MD;  Location: [facility]    Injection, w/wo contrast, dx/therapeutic substance, epidural/subarachnoid; lumbar/sacral  N/A 1/19/2015    Procedure: LUMBAR EPIDURAL;  Surgeon: Carlos Snowden MD;  Location: [facility]    Injection, w/wo contrast, dx/therapeutic substance, epidural/subarachnoid; lumbar/sacral N/A 1/28/2015    Procedure: LUMBAR EPIDURAL;  Surgeon: Carlos Snowden MD;  Location: [facility]    Patient documented not to have experienced any of the following events N/A 1/6/2015    Procedure: LUMBAR EPIDURAL;  Surgeon: Carlos Snowden MD;  Location: [facility]    Patient documented not to have experienced any of the following events N/A 1/19/2015    Procedure: LUMBAR EPIDURAL;  Surgeon: Carlos Snowden MD;  Location: [facility]    Patient documented not to have experienced any of the following events N/A 1/28/2015    Procedure: LUMBAR EPIDURAL;  Surgeon: Carlos Snowden MD;  Location: [facility]    Patient withough preoperative order for iv antibiotic surgical site infection prophylaxis. N/A 1/6/2015    Procedure: LUMBAR EPIDURAL;  Surgeon: Carlos Snowden MD;  Location: [facility]    Patient withough preoperative order for iv antibiotic surgical site infection prophylaxis. N/A 1/19/2015    Procedure: LUMBAR EPIDURAL;  Surgeon: Carlos Snowden MD;  Location: [facility]    Patient withough preoperative order for iv antibiotic surgical site infection prophylaxis. N/A 1/28/2015    Procedure: LUMBAR EPIDURAL;  Surgeon: Carlos Snowden MD;  Location: [facility]    Tonsillectomy                Social History     Socioeconomic History    Marital status:     Number of children: 1   Occupational History    Occupation:  retired    Occupation: dance teacher-semi retired   Tobacco Use    Smoking status: Never    Smokeless tobacco: Never   Vaping Use    Vaping status: Never Used   Substance and Sexual Activity    Alcohol use: No     Alcohol/week: 0.0 standard  drinks of alcohol    Drug use: No    Sexual activity: Never     Comment:    Other Topics Concern     Service No    Blood Transfusions No    Caffeine Concern Yes     Comment: 2-3 cup coffee/day    Occupational Exposure No    Hobby Hazards No    Sleep Concern No    Stress Concern No    Weight Concern No    Special Diet Yes    Exercise Yes     Comment: tap dancing champion-senior    Seat Belt Yes    Self-Exams Yes   Social History Narrative    The patient does not use an assistive device..      The patient does live in a home with stairs.  (Basement)     Social Drivers of Health     Food Insecurity: No Food Insecurity (3/27/2025)    Received from [facility]    Hunger Vital Sign     Worried About Running Out of Food in the Last Year: Never true     Ran Out of Food in the Last Year: Never true   Transportation Needs: No Transportation Needs (3/27/2025)    Received from [facility]    PRAPARE - Transportation     Lack of Transportation (Medical): No     Lack of Transportation (Non-Medical): No   Housing Stability: Not At Risk (3/27/2025)    Received from [facility]    NCSS - Housing/Utilities     Has Housing: Yes     Worried About Losing Housing: No     Unable to Get Utilities: No            Review of Systems    Positive for stated complaint: Wound Care    Other systems are as noted in HPI.  Constitutional and vital signs reviewed.      All other systems reviewed and negative except as noted above.    Physical Exam     ED Triage Vitals [05/15/25 1107]   /71   Pulse 80   Resp 16   Temp 98.1 °F (36.7 °C)   Temp src Oral   SpO2 97 %   O2 Device None (Room air)     Current:/71   Pulse 80   Temp 98.1 °F (36.7 °C) (Oral)   Resp 16   SpO2 97%     Physical Exam  Vitals and nursing note reviewed.   Constitutional:       General: She is not in acute distress.     Appearance: Normal appearance. She is not ill-appearing or toxic-appearing.      Comments: Elderly and frail    HENT:      Head: Normocephalic and atraumatic.      Nose: Nose normal.      Mouth/Throat:      Mouth: Mucous membranes are moist.      Pharynx: Oropharynx is clear.   Eyes:      Pupils: Pupils are equal, round, and reactive to light.   Cardiovascular:      Rate and Rhythm: Normal rate and regular rhythm.      Pulses: Normal pulses.      Heart sounds: Murmur heard.   Pulmonary:      Effort: Pulmonary effort is normal. No respiratory distress.      Breath sounds: Normal breath sounds.      Comments: Lungs clear.  No adventitious lung sounds.  No distress.  No hypoxia.  Pulse ox 97% ra. Which is normal    Abdominal:      General: Abdomen is flat.      Palpations: Abdomen is soft.   Musculoskeletal:         General: No signs of injury. Normal range of motion.      Left elbow: Laceration present. No swelling or deformity. No tenderness.      Cervical back: Normal range of motion and neck supple.      Comments: Full range of motion of the left elbow   Skin:     General: Skin is warm and dry.      Capillary Refill: Capillary refill takes less than 2 seconds.      Findings: Laceration present.      Comments: 5cm U-shaped skin tear to the left elbow noted.  A dirty dressing is in place.  This was removed.  It appears the patient remove the Steri-Strips herself.  The skin flap is fairly well approximated.  Close to anatomical alignment.  No bleeding.  No drainage.  No tenderness.   Neurological:      General: No focal deficit present.      Mental Status: She is alert and oriented to person, place, and time.      GCS: GCS eye subscore is 4. GCS verbal subscore is 5. GCS motor subscore is 6.   Psychiatric:         Mood and Affect: Mood normal.         Behavior: Behavior normal.         Thought Content: Thought content normal.         Judgment: Judgment normal.         ED Course   Radiology:  No results found.  Labs Reviewed - No data to display    MDM     Medical Decision Making  Differential diagnoses reflecting the  complexity of care include: Wound check, skin tear, elbow injury, wound infection  Patient seen here yesterday and had Steri-Strips applied to the left elbow skin tear.  She is here requesting a wound check and dressing change.  When her dressing was removed from the left elbow was noted that she had already remove the Steri-Strips herself.  The wound is fairly well-approximated, no bleeding or drainage.  No need to reapply Steri-Strips at this time.  We did discuss the option of CT brain and left elbow x-ray again but patient declined  She states her tetanus is up-to-date.  She is taking doxycycline for infection prophylaxis   The wound was cleaned, a clean dressing was applied    Plan of Care: Very clear verbal and written discharge instructions were provided to the patient.  Stop using peroxide to clean the wound.  Clean with soap and water.  Apply Vaseline.  Change the dressing as needed.  Continue doxycycline as previously prescribed.  Schedule wound check with primary physician next week    Results and plan of care discussed with the patient/family. They are in agreement with discharge. They understand to follow up with their primary doctor or the referral physician for further evaluation, especially if no improvement.  Also discussed the limitations of immediate care, patient is aware that if symptoms are worse they should go to the emergency room. Verbal and written discharge instructions were given.     My independent interpretation of studies of: N/A  Diagnostic tests and medications considered but not ordered were: CT brain, left elbow x-ray  Shared decision making was done by: Patient declined CT brain, left elbow x-ray  Comorbidities that add complexity to management include: DVT, hyperlipidemia  External chart review was done and was noted: Seen here yesterday after a fall approximately 3 days ago with closed head injury.  She had Steri-Strips applied to a left elbow x-ray.  She was discharged home  with doxycycline.  She refused CT brain and left elbow x-ray  History obtained by an independent source was from: N/A  Discussions and management was done with: N/A  Social determinants of health that affect care: Age              Problems Addressed:  Skin tear of left elbow without complication, subsequent encounter: acute illness or injury  Visit for wound check: acute illness or injury    Risk  OTC drugs.        Disposition and Plan     Clinical Impression:  1. Visit for wound check    2. Skin tear of left elbow without complication, subsequent encounter         Disposition:  Discharge  5/15/2025 11:14 am    Follow-up:  Marychuy Finnegan MD  [address] [place] [address]  [phone]    Schedule an appointment as soon as possible for a visit in 1 week  For wound re-check          Medications Prescribed:  Discharge Medication List as of 5/15/2025 11:18 AM

## 2025-05-15 NOTE — ED INITIAL ASSESSMENT (HPI)
Patient arrives ambulatory with cane with c/o wound to left arm. Reports she was told to follow up for wound. Reports no issues. Reports she started the antibiotic she was prescribed yesterday.

## 2025-05-15 NOTE — DISCHARGE INSTRUCTIONS
Clean the wound with soap and water.  Stop using peroxide.  Apply a generous amount of Vaseline over the wound.  Change the dressing daily and as needed if soiled.  Continue the antibiotic.  Have your doctor recheck the wound next week

## 2025-05-21 ENCOUNTER — APPOINTMENT (OUTPATIENT)
Dept: ULTRASOUND IMAGING | Age: OVER 89
End: 2025-05-21
Attending: EMERGENCY MEDICINE

## 2025-05-21 ENCOUNTER — HOSPITAL ENCOUNTER (EMERGENCY)
Age: OVER 89
Discharge: HOME | End: 2025-05-22
Attending: EMERGENCY MEDICINE

## 2025-05-21 DIAGNOSIS — I10 CHRONIC HYPERTENSION: ICD-10-CM

## 2025-05-21 DIAGNOSIS — Z79.01 CURRENT USE OF LONG TERM ANTICOAGULATION: ICD-10-CM

## 2025-05-21 DIAGNOSIS — M71.22 BAKER'S CYST OF KNEE, LEFT: ICD-10-CM

## 2025-05-21 DIAGNOSIS — Z99.89 USES WALKER: ICD-10-CM

## 2025-05-21 DIAGNOSIS — M79.605 LEFT LEG PAIN: Primary | ICD-10-CM

## 2025-05-21 DIAGNOSIS — I70.90 ATHEROSCLEROSIS: ICD-10-CM

## 2025-05-21 DIAGNOSIS — M25.462 EFFUSION OF LEFT KNEE: ICD-10-CM

## 2025-05-21 DIAGNOSIS — M25.562 ACUTE PAIN OF LEFT KNEE: ICD-10-CM

## 2025-05-21 DIAGNOSIS — M17.12 ARTHRITIS OF LEFT KNEE: ICD-10-CM

## 2025-05-21 PROBLEM — S33.6XXA SPRAIN OF SACROILIAC LIGAMENT: Status: ACTIVE | Noted: 2021-06-29

## 2025-05-21 PROBLEM — M54.50 LOW BACK PAIN AT MULTIPLE SITES: Status: ACTIVE | Noted: 2018-07-18

## 2025-05-21 PROBLEM — Z86.718 HISTORY OF DEEP VENOUS THROMBOSIS (DVT) OF DISTAL VEIN OF RIGHT LOWER EXTREMITY: Status: ACTIVE | Noted: 2023-02-25

## 2025-05-21 PROBLEM — Z95.2 S/P TAVR (TRANSCATHETER AORTIC VALVE REPLACEMENT): Status: ACTIVE | Noted: 2023-07-27

## 2025-05-21 PROBLEM — D50.0 IRON DEFICIENCY ANEMIA DUE TO CHRONIC BLOOD LOSS: Status: ACTIVE | Noted: 2023-03-09

## 2025-05-21 PROBLEM — F40.240 CLAUSTROPHOBIA: Status: ACTIVE | Noted: 2018-03-01

## 2025-05-21 PROBLEM — I11.0 HYPERTENSIVE HEART DISEASE WITH CHRONIC DIASTOLIC CONGESTIVE HEART FAILURE  (CMD): Status: ACTIVE | Noted: 2023-02-10

## 2025-05-21 PROBLEM — I70.0 ATHEROSCLEROSIS OF AORTA (CMD): Status: ACTIVE | Noted: 2022-12-19

## 2025-05-21 PROBLEM — I50.32 HYPERTENSIVE HEART DISEASE WITH CHRONIC DIASTOLIC CONGESTIVE HEART FAILURE  (CMD): Status: ACTIVE | Noted: 2023-02-10

## 2025-05-21 PROBLEM — M79.18 MYOFASCIAL MUSCLE PAIN: Status: ACTIVE | Noted: 2022-10-27

## 2025-05-21 PROBLEM — K41.90 FEMORAL HERNIA OF LEFT SIDE: Status: ACTIVE | Noted: 2018-01-19

## 2025-05-21 PROBLEM — D64.9 ANEMIA: Status: ACTIVE | Noted: 2023-02-25

## 2025-05-21 PROBLEM — K55.1: Status: ACTIVE | Noted: 2025-01-21

## 2025-05-21 PROBLEM — I45.10 RBBB: Status: ACTIVE | Noted: 2023-07-27

## 2025-05-21 PROBLEM — I82.411 ACUTE DEEP VEIN THROMBOSIS (DVT) OF FEMORAL VEIN OF RIGHT LOWER EXTREMITY  (CMD): Status: ACTIVE | Noted: 2023-02-25

## 2025-05-21 PROBLEM — H35.363 DRUSEN (DEGENERATIVE) OF MACULA, BILATERAL: Status: ACTIVE | Noted: 2018-03-01

## 2025-05-21 PROBLEM — C49.A0: Status: ACTIVE | Noted: 2023-08-03

## 2025-05-21 PROBLEM — K40.90 LEFT INGUINAL HERNIA: Status: ACTIVE | Noted: 2023-02-27

## 2025-05-21 PROBLEM — E46 PROTEIN-CALORIE MALNUTRITION  (CMD): Status: ACTIVE | Noted: 2021-12-13

## 2025-05-21 PROCEDURE — 93971 EXTREMITY STUDY: CPT

## 2025-05-21 PROCEDURE — 99284 EMERGENCY DEPT VISIT MOD MDM: CPT | Performed by: EMERGENCY MEDICINE

## 2025-05-21 RX ORDER — ATORVASTATIN CALCIUM 10 MG/1
1 TABLET, FILM COATED ORAL DAILY
COMMUNITY
Start: 2025-03-26

## 2025-05-21 RX ORDER — DOXYCYCLINE 100 MG/1
CAPSULE ORAL
COMMUNITY
Start: 2025-05-14

## 2025-05-21 RX ORDER — ACETAMINOPHEN 500 MG
1000 TABLET ORAL ONCE
Status: COMPLETED | OUTPATIENT
Start: 2025-05-22 | End: 2025-05-22

## 2025-05-21 RX ORDER — TRAMADOL HYDROCHLORIDE 50 MG/1
50 TABLET ORAL ONCE
Status: COMPLETED | OUTPATIENT
Start: 2025-05-22 | End: 2025-05-22

## 2025-05-21 RX ORDER — TIZANIDINE HYDROCHLORIDE 4 MG/1
1 CAPSULE, GELATIN COATED ORAL 3 TIMES DAILY PRN
COMMUNITY
Start: 2025-04-28

## 2025-05-21 ASSESSMENT — PAIN SCALES - GENERAL: PAINLEVEL_OUTOF10: 8

## 2025-05-22 ENCOUNTER — APPOINTMENT (OUTPATIENT)
Dept: GENERAL RADIOLOGY | Age: OVER 89
End: 2025-05-22
Attending: EMERGENCY MEDICINE

## 2025-05-22 VITALS
TEMPERATURE: 98.1 F | BODY MASS INDEX: 18.73 KG/M2 | HEART RATE: 80 BPM | RESPIRATION RATE: 18 BRPM | HEIGHT: 60 IN | SYSTOLIC BLOOD PRESSURE: 171 MMHG | DIASTOLIC BLOOD PRESSURE: 73 MMHG | OXYGEN SATURATION: 99 %

## 2025-05-22 PROCEDURE — 73562 X-RAY EXAM OF KNEE 3: CPT

## 2025-05-22 PROCEDURE — 10002803 HB RX 637: Performed by: EMERGENCY MEDICINE

## 2025-05-22 PROCEDURE — 10004651 HB RX, NO CHARGE ITEM: Performed by: EMERGENCY MEDICINE

## 2025-05-22 RX ADMIN — TRAMADOL HYDROCHLORIDE 50 MG: 50 TABLET, COATED ORAL at 00:22

## 2025-05-22 RX ADMIN — TIZANIDINE 4 MG: 4 TABLET ORAL at 00:22

## 2025-05-22 RX ADMIN — ACETAMINOPHEN 1000 MG: 500 TABLET ORAL at 00:22

## 2025-06-01 ENCOUNTER — APPOINTMENT (OUTPATIENT)
Dept: GENERAL RADIOLOGY | Facility: HOSPITAL | Age: OVER 89
End: 2025-06-01
Attending: EMERGENCY MEDICINE
Payer: MEDICARE

## 2025-06-01 ENCOUNTER — HOSPITAL ENCOUNTER (INPATIENT)
Facility: HOSPITAL | Age: OVER 89
LOS: 3 days | Discharge: NURSING, REHAB OR LONG-TERM CARE | End: 2025-06-04
Attending: EMERGENCY MEDICINE | Admitting: INTERNAL MEDICINE
Payer: MEDICARE

## 2025-06-01 DIAGNOSIS — S72.059A: ICD-10-CM

## 2025-06-01 DIAGNOSIS — R00.2 PALPITATIONS: ICD-10-CM

## 2025-06-01 DIAGNOSIS — S72.002A CLOSED FRACTURE OF NECK OF LEFT FEMUR, INITIAL ENCOUNTER (HCC): Primary | ICD-10-CM

## 2025-06-01 DIAGNOSIS — M54.16 LUMBAR RADICULOPATHY: ICD-10-CM

## 2025-06-01 LAB
ANION GAP SERPL CALC-SCNC: 8 MMOL/L (ref 0–18)
APTT PPP: 33.3 SECONDS (ref 23–36)
BASOPHILS # BLD AUTO: 0.08 X10(3) UL (ref 0–0.2)
BASOPHILS NFR BLD AUTO: 0.4 %
BUN BLD-MCNC: 20 MG/DL (ref 9–23)
BUN/CREAT SERPL: 27 (ref 10–20)
CALCIUM BLD-MCNC: 9.5 MG/DL (ref 8.7–10.4)
CHLORIDE SERPL-SCNC: 99 MMOL/L (ref 98–112)
CO2 SERPL-SCNC: 31 MMOL/L (ref 21–32)
CREAT BLD-MCNC: 0.74 MG/DL (ref 0.55–1.02)
DEPRECATED RDW RBC AUTO: 44.2 FL (ref 35.1–46.3)
EGFRCR SERPLBLD CKD-EPI 2021: 75 ML/MIN/1.73M2 (ref 60–?)
EOSINOPHIL # BLD AUTO: 0.02 X10(3) UL (ref 0–0.7)
EOSINOPHIL NFR BLD AUTO: 0.1 %
ERYTHROCYTE [DISTWIDTH] IN BLOOD BY AUTOMATED COUNT: 12.8 % (ref 11–15)
GLUCOSE BLD-MCNC: 239 MG/DL (ref 70–99)
HCT VFR BLD AUTO: 31.9 % (ref 35–48)
HGB BLD-MCNC: 9.6 G/DL (ref 12–16)
IMM GRANULOCYTES # BLD AUTO: 0.16 X10(3) UL (ref 0–1)
IMM GRANULOCYTES NFR BLD: 0.8 %
INR BLD: 0.97 (ref 0.8–1.2)
LYMPHOCYTES # BLD AUTO: 0.5 X10(3) UL (ref 1–4)
LYMPHOCYTES NFR BLD AUTO: 2.6 %
MCH RBC QN AUTO: 28.4 PG (ref 26–34)
MCHC RBC AUTO-ENTMCNC: 30.1 G/DL (ref 31–37)
MCV RBC AUTO: 94.4 FL (ref 80–100)
MONOCYTES # BLD AUTO: 1.28 X10(3) UL (ref 0.1–1)
MONOCYTES NFR BLD AUTO: 6.6 %
MRSA NASAL: NEGATIVE
NEUTROPHILS # BLD AUTO: 17.41 X10 (3) UL (ref 1.5–7.7)
NEUTROPHILS # BLD AUTO: 17.41 X10(3) UL (ref 1.5–7.7)
NEUTROPHILS NFR BLD AUTO: 89.5 %
OSMOLALITY SERPL CALC.SUM OF ELEC: 296 MOSM/KG (ref 275–295)
PLATELET # BLD AUTO: 406 10(3)UL (ref 150–450)
POTASSIUM SERPL-SCNC: 3.3 MMOL/L (ref 3.5–5.1)
PROTHROMBIN TIME: 13.5 SECONDS (ref 11.6–14.8)
RBC # BLD AUTO: 3.38 X10(6)UL (ref 3.8–5.3)
SODIUM SERPL-SCNC: 138 MMOL/L (ref 136–145)
STAPH A BY PCR: NEGATIVE
TROPONIN I SERPL HS-MCNC: 8 NG/L (ref ?–34)
WBC # BLD AUTO: 19.5 X10(3) UL (ref 4–11)

## 2025-06-01 PROCEDURE — 87641 MR-STAPH DNA AMP PROBE: CPT | Performed by: EMERGENCY MEDICINE

## 2025-06-01 PROCEDURE — 99285 EMERGENCY DEPT VISIT HI MDM: CPT

## 2025-06-01 PROCEDURE — 85025 COMPLETE CBC W/AUTO DIFF WBC: CPT | Performed by: EMERGENCY MEDICINE

## 2025-06-01 PROCEDURE — 73502 X-RAY EXAM HIP UNI 2-3 VIEWS: CPT | Performed by: EMERGENCY MEDICINE

## 2025-06-01 PROCEDURE — 85610 PROTHROMBIN TIME: CPT | Performed by: EMERGENCY MEDICINE

## 2025-06-01 PROCEDURE — 93010 ELECTROCARDIOGRAM REPORT: CPT

## 2025-06-01 PROCEDURE — 84484 ASSAY OF TROPONIN QUANT: CPT | Performed by: EMERGENCY MEDICINE

## 2025-06-01 PROCEDURE — 72100 X-RAY EXAM L-S SPINE 2/3 VWS: CPT | Performed by: EMERGENCY MEDICINE

## 2025-06-01 PROCEDURE — 87640 STAPH A DNA AMP PROBE: CPT | Performed by: EMERGENCY MEDICINE

## 2025-06-01 PROCEDURE — 80048 BASIC METABOLIC PNL TOTAL CA: CPT | Performed by: EMERGENCY MEDICINE

## 2025-06-01 PROCEDURE — 93005 ELECTROCARDIOGRAM TRACING: CPT

## 2025-06-01 PROCEDURE — 85730 THROMBOPLASTIN TIME PARTIAL: CPT | Performed by: EMERGENCY MEDICINE

## 2025-06-01 PROCEDURE — 96374 THER/PROPH/DIAG INJ IV PUSH: CPT

## 2025-06-01 PROCEDURE — 73560 X-RAY EXAM OF KNEE 1 OR 2: CPT | Performed by: EMERGENCY MEDICINE

## 2025-06-01 RX ORDER — KETOROLAC TROMETHAMINE 15 MG/ML
15 INJECTION, SOLUTION INTRAMUSCULAR; INTRAVENOUS ONCE
Status: COMPLETED | OUTPATIENT
Start: 2025-06-01 | End: 2025-06-01

## 2025-06-01 RX ORDER — ATORVASTATIN CALCIUM 10 MG/1
10 TABLET, FILM COATED ORAL DAILY
Status: DISCONTINUED | OUTPATIENT
Start: 2025-06-02 | End: 2025-06-04

## 2025-06-01 RX ORDER — TIZANIDINE 2 MG/1
4 TABLET ORAL 3 TIMES DAILY PRN
Status: DISCONTINUED | OUTPATIENT
Start: 2025-06-01 | End: 2025-06-04

## 2025-06-01 RX ORDER — POTASSIUM CHLORIDE 1500 MG/1
40 TABLET, EXTENDED RELEASE ORAL ONCE
Status: COMPLETED | OUTPATIENT
Start: 2025-06-01 | End: 2025-06-01

## 2025-06-01 RX ORDER — SENNOSIDES 8.6 MG
17.2 TABLET ORAL NIGHTLY PRN
Status: DISCONTINUED | OUTPATIENT
Start: 2025-06-01 | End: 2025-06-04

## 2025-06-01 RX ORDER — MORPHINE SULFATE 2 MG/ML
1 INJECTION, SOLUTION INTRAMUSCULAR; INTRAVENOUS EVERY 2 HOUR PRN
Status: DISCONTINUED | OUTPATIENT
Start: 2025-06-01 | End: 2025-06-04

## 2025-06-01 RX ORDER — SODIUM CHLORIDE 9 MG/ML
INJECTION, SOLUTION INTRAVENOUS CONTINUOUS
Status: DISCONTINUED | OUTPATIENT
Start: 2025-06-01 | End: 2025-06-02

## 2025-06-01 RX ORDER — ASPIRIN 81 MG/1
81 TABLET, CHEWABLE ORAL DAILY
Status: ON HOLD | COMMUNITY
Start: 2023-06-22 | End: 2025-06-04

## 2025-06-01 RX ORDER — BISACODYL 10 MG
10 SUPPOSITORY, RECTAL RECTAL
Status: DISCONTINUED | OUTPATIENT
Start: 2025-06-01 | End: 2025-06-04

## 2025-06-01 RX ORDER — HYDROCODONE BITARTRATE AND ACETAMINOPHEN 5; 325 MG/1; MG/1
2 TABLET ORAL EVERY 4 HOURS PRN
Status: DISCONTINUED | OUTPATIENT
Start: 2025-06-01 | End: 2025-06-04

## 2025-06-01 RX ORDER — ACETAMINOPHEN 325 MG/1
650 TABLET ORAL EVERY 4 HOURS PRN
Status: DISCONTINUED | OUTPATIENT
Start: 2025-06-01 | End: 2025-06-04

## 2025-06-01 RX ORDER — ATORVASTATIN CALCIUM 10 MG/1
1 TABLET, FILM COATED ORAL DAILY
COMMUNITY
Start: 2025-03-26 | End: 2025-06-01

## 2025-06-01 RX ORDER — METOCLOPRAMIDE HYDROCHLORIDE 5 MG/ML
5 INJECTION INTRAMUSCULAR; INTRAVENOUS EVERY 8 HOURS PRN
Status: DISCONTINUED | OUTPATIENT
Start: 2025-06-01 | End: 2025-06-04

## 2025-06-01 RX ORDER — POLYETHYLENE GLYCOL 3350 17 G/17G
17 POWDER, FOR SOLUTION ORAL DAILY PRN
Status: DISCONTINUED | OUTPATIENT
Start: 2025-06-01 | End: 2025-06-04

## 2025-06-01 RX ORDER — HYDRALAZINE HYDROCHLORIDE 20 MG/ML
10 INJECTION INTRAMUSCULAR; INTRAVENOUS EVERY 6 HOURS PRN
Status: DISPENSED | OUTPATIENT
Start: 2025-06-01 | End: 2025-06-02

## 2025-06-01 RX ORDER — MORPHINE SULFATE 2 MG/ML
2 INJECTION, SOLUTION INTRAMUSCULAR; INTRAVENOUS EVERY 2 HOUR PRN
Status: DISCONTINUED | OUTPATIENT
Start: 2025-06-01 | End: 2025-06-04

## 2025-06-01 RX ORDER — HYDROCODONE BITARTRATE AND ACETAMINOPHEN 5; 325 MG/1; MG/1
1 TABLET ORAL EVERY 4 HOURS PRN
Status: DISCONTINUED | OUTPATIENT
Start: 2025-06-01 | End: 2025-06-04

## 2025-06-01 RX ORDER — SODIUM PHOSPHATE, DIBASIC AND SODIUM PHOSPHATE, MONOBASIC 7; 19 G/230ML; G/230ML
1 ENEMA RECTAL ONCE AS NEEDED
Status: DISCONTINUED | OUTPATIENT
Start: 2025-06-01 | End: 2025-06-04

## 2025-06-01 RX ORDER — MORPHINE SULFATE 4 MG/ML
4 INJECTION, SOLUTION INTRAMUSCULAR; INTRAVENOUS EVERY 2 HOUR PRN
Status: DISCONTINUED | OUTPATIENT
Start: 2025-06-01 | End: 2025-06-04

## 2025-06-01 RX ORDER — ONDANSETRON 2 MG/ML
4 INJECTION INTRAMUSCULAR; INTRAVENOUS EVERY 6 HOURS PRN
Status: DISCONTINUED | OUTPATIENT
Start: 2025-06-01 | End: 2025-06-04

## 2025-06-01 NOTE — ED PROVIDER NOTES
Patient Seen in: [facility] Emergency Department        History  Chief Complaint   Patient presents with    Leg or Foot Injury    Arrythmia/Palpitations    Fall     Stated Complaint: L knee pain    Subjective:   HPI            89-year-old female with history of TAVR, CAD, diastolic heart failure, pulmonary hypertension, anemia,  presents for evaluation of left leg pain. She has been struggling with left knee pain for a couple of weeks.  She was evaluated in outside ED and left knee x-ray showed arthritis and DVT study was negative.  She followed up with orthopedics and had a steroid injection of the left knee but only got minimal relief.  Today she was walking, lost her balance and fell.  Since then she has pain at the low back radiating into the left thigh and knee.  Additionally she complained of palpitations, lightheaded, and sweating at the forehead and palms, though no chest pain or dyspnea.    Objective:     Past Medical History:    Claustrophobia    Deep vein thrombosis (HCC)    Right leg    Hyperglycemia    Hyperlipidemia    MENOPAUSE    OSTEOARTHRITIS    back    Urge incontinence              Past Surgical History:   Procedure Laterality Date    Back surgery  4/8/2015    L4-5 microdiskectomy    Cataract  10/1/14    right    Fluor gid & loclzj ndl/cath spi dx/ther njx N/A 1/6/2015    Procedure: LUMBAR EPIDURAL;  Surgeon: Carlos Snowden MD;  Location: [facility]    Fluor gid & loclzj ndl/cath spi dx/ther njx N/A 1/19/2015    Procedure: LUMBAR EPIDURAL;  Surgeon: Carlos Snowden MD;  Location: [facility]    Fluor gid & loclzj ndl/cath spi dx/ther njx N/A 1/28/2015    Procedure: LUMBAR EPIDURAL;  Surgeon: Carlos Snowden MD;  Location: [facility]    Injection, w/wo contrast, dx/therapeutic substance, epidural/subarachnoid; lumbar/sacral N/A 1/6/2015    Procedure: LUMBAR EPIDURAL;  Surgeon: Carlos Snowden MD;  Location: [facility]    Injection, w/wo contrast, dx/therapeutic substance, epidural/subarachnoid; lumbar/sacral N/A 1/19/2015    Procedure: LUMBAR EPIDURAL;  Surgeon: Carlos Snowden MD;  Location: [facility]    Injection, w/wo contrast, dx/therapeutic substance, epidural/subarachnoid; lumbar/sacral N/A 1/28/2015    Procedure: LUMBAR EPIDURAL;  Surgeon: Carlos Snowden MD;  Location: [facility]    Patient documented not to have experienced any of the following events N/A 1/6/2015    Procedure: LUMBAR EPIDURAL;  Surgeon: Carlos Snowden MD;  Location: [facility]    Patient documented not to have experienced any of the following events N/A 1/19/2015    Procedure: LUMBAR EPIDURAL;  Surgeon: Carlos Snowden MD;  Location: [facility]    Patient documented not to have experienced any of the following events N/A 1/28/2015    Procedure: LUMBAR EPIDURAL;  Surgeon: Carlos Snowden MD;  Location: [facility]    Patient withough preoperative order for iv antibiotic surgical site infection prophylaxis. N/A 1/6/2015    Procedure: LUMBAR EPIDURAL;  Surgeon: Carlos Snowden MD;  Location: [facility]    Patient withough preoperative order for iv antibiotic surgical site infection prophylaxis. N/A 1/19/2015    Procedure: LUMBAR EPIDURAL;  Surgeon: Carlos Snowden MD;  Location: [facility]    Patient withough preoperative order for iv antibiotic surgical site infection prophylaxis. N/A 1/28/2015    Procedure: LUMBAR EPIDURAL;  Surgeon: Carlos Snowden MD;  Location: [facility]    Tonsillectomy                  Social History     Socioeconomic History    Marital status:     Number of children: 1   Occupational History    Occupation:  retired    Occupation: dance teacher-semi retired   Tobacco Use    Smoking status: Never    Smokeless tobacco: Never   Vaping Use     Vaping status: Never Used   Substance and Sexual Activity    Alcohol use: No     Alcohol/week: 0.0 standard drinks of alcohol    Drug use: No    Sexual activity: Never     Comment:    Other Topics Concern     Service No    Blood Transfusions No    Caffeine Concern Yes     Comment: 2-3 cup coffee/day    Occupational Exposure No    Hobby Hazards No    Sleep Concern No    Stress Concern No    Weight Concern No    Special Diet Yes    Exercise Yes     Comment: tap dancing champion-senior    Seat Belt Yes    Self-Exams Yes   Social History Narrative    The patient does not use an assistive device..      The patient does live in a home with stairs.  (Basement)     Social Drivers of Health     Food Insecurity: No Food Insecurity (6/1/2025)    NCSS - Food Insecurity     Worried About Running Out of Food in the Last Year: No     Ran Out of Food in the Last Year: No   Transportation Needs: No Transportation Needs (6/1/2025)    NCSS - Transportation     Lack of Transportation: No   Housing Stability: Not At Risk (6/1/2025)    NCSS - Housing/Utilities     Has Housing: Yes     Worried About Losing Housing: No     Unable to Get Utilities: No                                Physical Exam    ED Triage Vitals   BP 06/01/25 1608 (!) 188/65   Pulse 06/01/25 1608 98   Resp 06/01/25 1608 22   Temp 06/01/25 1610 98.5 °F (36.9 °C)   Temp src 06/01/25 1610 Oral   SpO2 06/01/25 1610 97 %   O2 Device 06/01/25 1608 None (Room air)       Current Vitals:   Vital Signs  BP: (!) 180/69 (Pain severe, pain medication to be given)  Pulse: 99  Resp: 20  Temp: 98.7 °F (37.1 °C)  Temp src: Oral  MAP (mmHg): (!) 101    Oxygen Therapy  SpO2: 99 %  O2 Device: None (Room air)            Physical Exam  Vitals and nursing note reviewed.   Constitutional:       Appearance: She is well-developed.   HENT:      Head: Normocephalic and atraumatic.   Eyes:      Extraocular Movements: Extraocular movements intact.   Cardiovascular:      Rate and  Rhythm: Normal rate and regular rhythm.      Heart sounds: Normal heart sounds.      Comments: Bilateral DP and PT pulses 2+  Pulmonary:      Effort: Pulmonary effort is normal.      Breath sounds: Normal breath sounds.   Abdominal:      General: There is no distension.      Palpations: Abdomen is soft.      Tenderness: There is no abdominal tenderness.   Musculoskeletal:      Cervical back: Normal range of motion.      Comments: +TTP at the left low back and L knee with small effusion. Diminshed ROM of the L hip and knee 2/2 pain. Muscular compartments soft throughout the LLE   Skin:     General: Skin is warm.      Capillary Refill: Capillary refill takes less than 2 seconds.   Neurological:      Mental Status: She is alert.      Comments: No focal deficits       Differential diagnosis includes but is not limited to lumbosacral radiculopathy, osteoarthritis, fracture, neuropathy        ED Course  Labs Reviewed   BASIC METABOLIC PANEL (8) - Abnormal; Notable for the following components:       Result Value    Glucose 239 (*)     Potassium 3.3 (*)     BUN/CREA Ratio 27.0 (*)     Calculated Osmolality 296 (*)     All other components within normal limits   CBC WITH DIFFERENTIAL WITH PLATELET - Abnormal; Notable for the following components:    WBC 19.5 (*)     RBC 3.38 (*)     HGB 9.6 (*)     HCT 31.9 (*)     MCHC 30.1 (*)     Neutrophil Absolute Prelim 17.41 (*)     Neutrophil Absolute 17.41 (*)     Lymphocyte Absolute 0.50 (*)     Monocyte Absolute 1.28 (*)     All other components within normal limits   TROPONIN I HIGH SENSITIVITY - Normal   PROTHROMBIN TIME (PT) - Normal   PTT, ACTIVATED - Normal   URINALYSIS WITH CULTURE REFLEX   RAINBOW DRAW LAVENDER   RAINBOW DRAW LIGHT GREEN   MRSA/SA SCRN BY PCR:[facility]ER[facility] ORTHO SURG ONLY     EKG    Rate, intervals and axes as noted on EKG Report.  Rate: 97  Rhythm: Sinus Rhythm  Reading: No STEMI, diffuse ST depressions present, incomplete right bundle branch block, abnormal  EKG              XR LUMBAR SPINE (MIN 2 VIEWS) (CPT=72100)  Result Date: 6/1/2025  CONCLUSION:  1. No radiographically apparent acute lumbar spine compression fracture. 2. Multilevel lumbar spine degenerative changes with levoscoliosis of the lumbar spine and degenerative anterolisthesis of L5 relative to S1.   Dictated by (CST): Cesario Huynh MD on 6/01/2025 at 5:09 PM     Finalized by (CST): Cesario Huynh MD on 6/01/2025 at 5:11 PM          XR HIP W OR WO PELVIS 2 OR 3 VIEWS, LEFT (CPT=73502)  Result Date: 6/1/2025   Acute displaced and mildly impacted left femoral neck fracture.  Mild bilateral hip osteoarthritis.  Demineralization.  Atherosclerosis.  Osteitis pubis.  Partially imaged lower lumbar spine degeneration.    Dictated by (CST): Cesario Huynh MD on 6/01/2025 at 5:08 PM     Finalized by (CST): Cesario Huynh MD on 6/01/2025 at 5:09 PM          XR KNEE (1 OR 2 VIEWS), LEFT (CPT=73560)  Result Date: 6/1/2025  CONCLUSION:  1. No radiographically visible acute osseous injury of the left knee. 2. Mild-to-moderate tricompartmental osteoarthritis.  There is also chondrocalcinosis, which can be seen in the setting of coexistent crystal deposition arthropathy.   Dictated by (CST): Cesario Huynh MD on 6/01/2025 at 5:07 PM     Finalized by (CST): Cesario Huynh MD on 6/01/2025 at 5:08 PM                            [facility]       Admission disposition: 6/1/2025  5:44 PM           Medical Decision Making  On arrival to the ED, patient noted to be hypertensive with other vital signs stable.  EKG with ST depressions as above.  No ST elevations, no chest pain, troponin within normal limits.  Left hip x-ray per my independent interpretation is concerning for femoral neck fracture. D/w Dr Ford with orthopedic surgery who requests cardiac clearance and n.p.o. at midnight. Dr Raymundo with cardiology consulted. D/w Dr Alejandro for admission.      Problems Addressed:  Closed fracture of neck of left femur, initial encounter  (HCC): complicated acute illness or injury with systemic symptoms that poses a threat to life or bodily functions  Palpitations: complicated acute illness or injury with systemic symptoms that poses a threat to life or bodily functions    Amount and/or Complexity of Data Reviewed  Labs: ordered. Decision-making details documented in ED Course.  Radiology: ordered and independent interpretation performed. Decision-making details documented in ED Course.  ECG/medicine tests: ordered and independent interpretation performed. Decision-making details documented in ED Course.  Discussion of management or test interpretation with external provider(s): As above    Risk  Decision regarding hospitalization.        Disposition and Plan     Clinical Impression:  1. Closed fracture of neck of left femur, initial encounter ([facility])    2. Palpitations         Disposition:  Admit  6/1/2025  5:44 pm    Follow-up:  No follow-up provider specified.  We recommend that you schedule follow up care with a primary care provider within the next three months to obtain basic health screening including reassessment of your blood pressure.      Medications Prescribed:  Current Discharge Medication List                Supplementary Documentation:         Hospital Problems       Present on Admission  Date Reviewed: 5/15/2025          ICD-10-CM Noted POA    * (Principal) Closed fracture of neck of left femur, initial encounter ([facility]) S72.002A 6/1/2025 Unknown    Palpitations R00.2 6/1/2025 Unknown

## 2025-06-01 NOTE — ED QUICK NOTES
Orders for admission, patient is aware of plan and ready to go upstairs. Any questions, please call ED RN Tanika at extension [phone].     Patient Covid vaccination status: Fully vaccinated     COVID Test Ordered in ED: None    COVID Suspicion at Admission: N/A    Running Infusions: Medication Infusions[1] None    Mental Status/LOC at time of transport: alert and orientated x4, poor historian, at home walks with walker, lives alone.     Other pertinent information:   CIWA score: N/A   NIH score:  N/A               [1]

## 2025-06-01 NOTE — ED INITIAL ASSESSMENT (HPI)
Pt to ED via stretcher, here for worsening L knee pain x2 weeks, fall today at approx 1100, unwitnessed, denies hitting head, denies loc. PT reports \"my knee gave out\".

## 2025-06-01 NOTE — H&P
[facility] Hospitalist H&P       CC:   Chief Complaint   Patient presents with    Leg or Foot Injury    Arrythmia/Palpitations    Fall        PCP: Marychuy Finnegan MD    History of Present Illness: Patient is a 89 year old female with PMH DVT, hyperlipidemia, GIST, iron deficiency anemia, history of coronary artery disease with PCI in the past aortic stenosis status post TAVR who presents with mechanical fall and left hip fracture.  Earlier last week patient started having left knee pain she saw  As an outpatient underwent left knee injection this seemed to help 10% with regards to her left knee pain, she had an x-ray today in the emergency room of the knee which did not reveal any fractures but some osteoarthritis and chondrocalcinosis.  She lives alone she uses a walker or cane to get around today she lost her balance due to knee pain and fell on her left side she came to the hospital for further evaluation x-ray of the hip revealed acute displaced and mildly impacted left femoral neck fracture.       Patient is being mated for further operative management per Ortho and cardiology service was consulted due to episode of palpitations and abnormal EKG findings given cardiac history for preop cardiac evaluation.     At this time patient is unsure if she would like to proceed with surgery however she has a goal in mind to continue ambulating independently and I explained to her that surgery may be required to obtain this goal.     PMH  Past Medical History[1]     PSH  Past Surgical History[2]     ALL:  Allergies[3]     Home Medications:  Medications Taking[4]      Soc Hx  Social History     Tobacco Use    Smoking status: Never    Smokeless tobacco: Never   Substance Use Topics    Alcohol use: No     Alcohol/week: 0.0 standard drinks of alcohol        Fam Hx  Family History[5]    Review of Systems  Comprehensive ROS reviewed and negative except for what's stated above.     OBJECTIVE:  BP (!) 175/69    Pulse 103   Temp 98.5 °F (36.9 °C) (Oral)   Resp 23   Ht 5' (1.524 m)   Wt 94 lb (42.6 kg)   SpO2 99%   Breastfeeding No   BMI 18.36 kg/m²   General:  Alert, no distress   Head:  Normocephalic   Eyes:  Sclera anicteric   Nose: Nares normal. Septum midline   Throat: Lips, mucosa, and tongue normal   Neck: Supple, symmetrical, trachea midline,   Lungs:   Clear to auscultation bilaterally. Normal effort   Chest wall:  No tenderness or deformity.   Heart:  Regular rate and rhythm loud S2 systolic murmur in place   Abdomen:   Soft, non-tender. Bowel sounds normal.   Extremities: No edema in the legs no erythema tenderness or warmth over the left knee no apparent effusion   Skin: Skin color, texture, turgor normal   Neurologic: Normal strength     Diagnostic Data:    CBC/Chem  Recent Labs   Lab 06/01/25  1618   WBC 19.5*   HGB 9.6*   MCV 94.4   .0       Recent Labs   Lab 06/01/25  1618      K 3.3*   CL 99   CO2 31.0   BUN 20   CREATSERUM 0.74   *   CA 9.5       No results for input(s): \"ALT\", \"AST\", \"ALB\", \"AMYLASE\", \"LIPASE\", \"LDH\" in the last 168 hours.    Invalid input(s): \"ALPHOS\", \"TBIL\", \"DBIL\", \"TPROT\"    No results for input(s): \"TROP\" in the last 168 hours.    Additional Diagnostics: ECG: Normal sinus rhythm incomplete right bundle branch block      Radiology: XR LUMBAR SPINE (MIN 2 VIEWS) (CPT=72100)  Result Date: 6/1/2025  CONCLUSION:  1. No radiographically apparent acute lumbar spine compression fracture. 2. Multilevel lumbar spine degenerative changes with levoscoliosis of the lumbar spine and degenerative anterolisthesis of L5 relative to S1.   Dictated by (CST): Cesario Huynh MD on 6/01/2025 at 5:09 PM     Finalized by (CST): Cesario Huynh MD on 6/01/2025 at 5:11 PM          XR HIP W OR WO PELVIS 2 OR 3 VIEWS, LEFT (CPT=73502)  Result Date: 6/1/2025   Acute displaced and mildly impacted left femoral neck fracture.  Mild bilateral hip osteoarthritis.  Demineralization.   Atherosclerosis.  Osteitis pubis.  Partially imaged lower lumbar spine degeneration.    Dictated by (CST): Cesario Huynh MD on 6/01/2025 at 5:08 PM     Finalized by (CST): Cesario Huynh MD on 6/01/2025 at 5:09 PM          XR KNEE (1 OR 2 VIEWS), LEFT (CPT=73560)  Result Date: 6/1/2025  CONCLUSION:  1. No radiographically visible acute osseous injury of the left knee. 2. Mild-to-moderate tricompartmental osteoarthritis.  There is also chondrocalcinosis, which can be seen in the setting of coexistent crystal deposition arthropathy.   Dictated by (CST): Cesario Huynh MD on 6/01/2025 at 5:07 PM     Finalized by (CST): Cesario Huynh MD on 6/01/2025 at 5:08 PM              ASSESSMENT / PLAN:   Patient is a 89 year old female with PMH DVT, hyperlipidemia, GIST, iron deficiency anemia, history of coronary artery disease with PCI in the past aortic stenosis status post TAVR who presents with mechanical fall and left hip fracture.    Mechanical fall with resulting acute displaced mildly impacted left femoral neck fracture  - Ortho consulted for possible operative management discussed risks and benefits with patient she has desire to continue ambulating would consider operative management after discussion with Ortho  Currently only taking aspirin did not take any aspirin today per her report  No longer taking oral blood thinners  Will keep n.p.o. at midnight pending Ortho eval  Given cardiac history cardiology service has been consulted for preoperative cardiac risk assessment    2.  Coronary artery disease history of PCI in the past aortic stenosis status post TAVR  - Hold home aspirin  Hold home Lasix due to slightly elevated BUN to creatinine ratio  Will give general fluids overnight at 30 an hour for 12 hours  Continue home statin    3.  Chronic pain muscle aches okay to continue tizanidine    GERD no longer taking PPI    Left knee pain exam appears reassuring  Outpatient follow-up recommended on the  calcinosis    FN:  - IVF: Saline at 30 an hour while NPO  - Diet: Okay for diet this evening but n.p.o. after midnight    DVT Prophy: Hold in anticipation for surgery  Lines: Peripheral IV      DNR select confirmed with patient and daughter at bedside on admission    Discussed case with ER provider    Dispo: pending clinical course    Outpatient records or previous hospital records reviewed.     Further recommendations pending patient's clinical course.  [facility] hospitalist to continue to follow patient while in house    Patient and/or patient's family given opportunity to ask questions and note understanding and agreeing with therapeutic plan as outlined    Thank You,  Evan Alejandro DO    Hospitalist with [facility]  Answering Service number: [phone]         [1]   Past Medical History:   Claustrophobia    Deep vein thrombosis (HCC)    Right leg    Hyperglycemia    Hyperlipidemia    MENOPAUSE    OSTEOARTHRITIS    back    Urge incontinence   [2]   Past Surgical History:  Procedure Laterality Date    Back surgery  4/8/2015    L4-5 microdiskectomy    Cataract  10/1/14    right    Fluor gid & loclzj ndl/cath spi dx/ther njx N/A 1/6/2015    Procedure: LUMBAR EPIDURAL;  Surgeon: Carlos Snowden MD;  Location: [facility] FOR PAIN MANA[facility]EMENT    Fluor gid & loclzj ndl/cath spi dx/ther njx N/A 1/19/2015    Procedure: LUMBAR EPIDURAL;  Surgeon: Carlos Snowden MD;  Location: [facility] FOR PAIN MA[facility]    Fluor gid & loclzj ndl/cath spi dx/ther njx N/A 1/28/2015    Procedure: LUMBAR EPIDURAL;  Surgeon: Carlos Snowden MD;  Location: [facility] PAIN MA[facility]    Injection, w/wo contrast, dx/therapeutic substance, epidural/subarachnoid; lumbar/sacral N/A 1/6/2015    Procedure: LUMBAR EPIDURAL;  Surgeon: Carlos Snowden MD;  Location: [facility] PAIN MA[facility]    Injection, w/wo contrast, dx/therapeutic substance, epidural/subarachnoid; lumbar/sacral N/A 1/19/2015    Procedure: LUMBAR EPIDURAL;  Surgeon:  Carlos Snowden MD;  Location: [facility] FOR PAIN MANAGEMENT    Injection, w/wo contrast, dx/therapeutic substance, epidural/subarachnoid; lumbar/sacral N/A 1/28/2015    Procedure: LUMBAR EPIDURAL;  Surgeon: Carlos Snowden MD;  Location: [facility] FOR PAIN MANAGEMENT    Patient documented not to have experienced any of the following events N/A 1/6/2015    Procedure: LUMBAR EPIDURAL;  Surgeon: Carlos Snowden MD;  Location: [facility] FOR PAIN MANA[facility]EMENT    Patient documented not to have experienced any of the following events N/A 1/19/2015    Procedure: LUMBAR EPIDURAL;  Surgeon: Carlos Snowden MD;  Location: [facility] FOR PAIN MANA[facility]EMENT    Patient documented not to have experienced any of the following events N/A 1/28/2015    Procedure: LUMBAR EPIDURAL;  Surgeon: Carlos Snowden MD;  Location: [facility] FOR PAIN [facility]    Patient withough preoperative order for iv antibiotic surgical site infection prophylaxis. N/A 1/6/2015    Procedure: LUMBAR EPIDURAL;  Surgeon: Carlos Snowden MD;  Location: [facility] FOR PAIN [facility]    Patient withough preoperative order for iv antibiotic surgical site infection prophylaxis. N/A 1/19/2015    Procedure: LUMBAR EPIDURAL;  Surgeon: Carlos Snowden MD;  Location: [facility] FOR PAIN [facility]    Patient withough preoperative order for iv antibiotic surgical site infection prophylaxis. N/A 1/28/2015    Procedure: LUMBAR EPIDURAL;  Surgeon: Carlos Snowden MD;  Location: [facility] FOR PAIN [facility]    Tonsillectomy     [3]   Allergies  Allergen Reactions    Keflex DIARRHEA    Bumetanide OTHER (SEE COMMENTS)     Constipation    Cefadroxil DIARRHEA     severe    Clindamycin DIARRHEA    Pantoprazole OTHER (SEE COMMENTS)     constipation    Augmentin [Amoxicillin-Pot Clavulanate] DIARRHEA    Prednisone DIARRHEA    Sulfamethoxazole W/Trimethoprim DIARRHEA   [4]   Outpatient Medications Marked as Taking for the 6/1/25 encounter (Hospital Encounter)   Medication Sig  Dispense Refill    aspirin 81 MG Oral Chew Tab Chew 1 tablet (81 mg total) by mouth daily.     [5]   Family History  Problem Relation Age of Onset    Heart Disorder Father         MI    Diabetes Father     Diabetes Maternal Grandmother

## 2025-06-02 ENCOUNTER — APPOINTMENT (OUTPATIENT)
Dept: GENERAL RADIOLOGY | Facility: HOSPITAL | Age: OVER 89
End: 2025-06-02
Attending: ORTHOPAEDIC SURGERY
Payer: MEDICARE

## 2025-06-02 ENCOUNTER — ANESTHESIA EVENT (OUTPATIENT)
Dept: SURGERY | Facility: HOSPITAL | Age: OVER 89
End: 2025-06-02
Payer: MEDICARE

## 2025-06-02 ENCOUNTER — ANESTHESIA (OUTPATIENT)
Dept: SURGERY | Facility: HOSPITAL | Age: OVER 89
End: 2025-06-02
Payer: MEDICARE

## 2025-06-02 LAB
ANION GAP SERPL CALC-SCNC: 6 MMOL/L (ref 0–18)
ANTIBODY SCREEN: NEGATIVE
ATRIAL RATE: 97 BPM
BILIRUB UR QL: NEGATIVE
BUN BLD-MCNC: 16 MG/DL (ref 9–23)
BUN/CREAT SERPL: 27.1 (ref 10–20)
CALCIUM BLD-MCNC: 9.5 MG/DL (ref 8.7–10.4)
CHLORIDE SERPL-SCNC: 104 MMOL/L (ref 98–112)
CO2 SERPL-SCNC: 30 MMOL/L (ref 21–32)
COLOR UR: YELLOW
CREAT BLD-MCNC: 0.59 MG/DL (ref 0.55–1.02)
DEPRECATED RDW RBC AUTO: 45.7 FL (ref 35.1–46.3)
EGFRCR SERPLBLD CKD-EPI 2021: 83 ML/MIN/1.73M2 (ref 60–?)
ERYTHROCYTE [DISTWIDTH] IN BLOOD BY AUTOMATED COUNT: 13.2 % (ref 11–15)
GLUCOSE BLD-MCNC: 149 MG/DL (ref 70–99)
GLUCOSE UR-MCNC: NORMAL MG/DL
HCT VFR BLD AUTO: 32 % (ref 35–48)
HGB BLD-MCNC: 9.4 G/DL (ref 12–16)
KETONES UR-MCNC: NEGATIVE MG/DL
LEUKOCYTE ESTERASE UR QL STRIP.AUTO: NEGATIVE
MCH RBC QN AUTO: 27.7 PG (ref 26–34)
MCHC RBC AUTO-ENTMCNC: 29.4 G/DL (ref 31–37)
MCV RBC AUTO: 94.4 FL (ref 80–100)
MRSA DNA SPEC QL NAA+PROBE: NEGATIVE
NITRITE UR QL STRIP.AUTO: NEGATIVE
OSMOLALITY SERPL CALC.SUM OF ELEC: 294 MOSM/KG (ref 275–295)
P AXIS: 61 DEGREES
P-R INTERVAL: 160 MS
PH UR: 8 [PH] (ref 5–8)
PLATELET # BLD AUTO: 338 10(3)UL (ref 150–450)
POTASSIUM SERPL-SCNC: 4.3 MMOL/L (ref 3.5–5.1)
POTASSIUM SERPL-SCNC: 4.3 MMOL/L (ref 3.5–5.1)
PROT UR-MCNC: 100 MG/DL
Q-T INTERVAL: 360 MS
QRS DURATION: 114 MS
QTC CALCULATION (BEZET): 457 MS
R AXIS: 73 DEGREES
RBC # BLD AUTO: 3.39 X10(6)UL (ref 3.8–5.3)
RH BLOOD TYPE: POSITIVE
SODIUM SERPL-SCNC: 140 MMOL/L (ref 136–145)
SP GR UR STRIP: 1.02 (ref 1–1.03)
T AXIS: 75 DEGREES
UROBILINOGEN UR STRIP-ACNC: NORMAL
VENTRICULAR RATE: 97 BPM
WBC # BLD AUTO: 12.5 X10(3) UL (ref 4–11)

## 2025-06-02 PROCEDURE — 86900 BLOOD TYPING SEROLOGIC ABO: CPT | Performed by: ORTHOPAEDIC SURGERY

## 2025-06-02 PROCEDURE — 86901 BLOOD TYPING SEROLOGIC RH(D): CPT | Performed by: ORTHOPAEDIC SURGERY

## 2025-06-02 PROCEDURE — 76000 FLUOROSCOPY <1 HR PHYS/QHP: CPT | Performed by: ORTHOPAEDIC SURGERY

## 2025-06-02 PROCEDURE — 87641 MR-STAPH DNA AMP PROBE: CPT | Performed by: ORTHOPAEDIC SURGERY

## 2025-06-02 PROCEDURE — 73502 X-RAY EXAM HIP UNI 2-3 VIEWS: CPT | Performed by: ORTHOPAEDIC SURGERY

## 2025-06-02 PROCEDURE — 85027 COMPLETE CBC AUTOMATED: CPT | Performed by: INTERNAL MEDICINE

## 2025-06-02 PROCEDURE — 86850 RBC ANTIBODY SCREEN: CPT | Performed by: ORTHOPAEDIC SURGERY

## 2025-06-02 PROCEDURE — 81001 URINALYSIS AUTO W/SCOPE: CPT | Performed by: EMERGENCY MEDICINE

## 2025-06-02 PROCEDURE — 88305 TISSUE EXAM BY PATHOLOGIST: CPT | Performed by: ORTHOPAEDIC SURGERY

## 2025-06-02 PROCEDURE — 84132 ASSAY OF SERUM POTASSIUM: CPT | Performed by: INTERNAL MEDICINE

## 2025-06-02 PROCEDURE — 0SRS0J9 REPLACEMENT OF LEFT HIP JOINT, FEMORAL SURFACE WITH SYNTHETIC SUBSTITUTE, CEMENTED, OPEN APPROACH: ICD-10-PCS | Performed by: ORTHOPAEDIC SURGERY

## 2025-06-02 PROCEDURE — 80048 BASIC METABOLIC PNL TOTAL CA: CPT | Performed by: INTERNAL MEDICINE

## 2025-06-02 PROCEDURE — 88311 DECALCIFY TISSUE: CPT | Performed by: ORTHOPAEDIC SURGERY

## 2025-06-02 DEVICE — SMARTSET HIGH PERFORMANCE MV MEDIUM VISCOSITY BONE CEMENT 40G
Type: IMPLANTABLE DEVICE | Site: HIP | Status: FUNCTIONAL
Brand: SMARTSET

## 2025-06-02 DEVICE — M-SPEC METAL FEMORAL HEAD 12/14 TAPER DIAMETER 28MM +1.5: Type: IMPLANTABLE DEVICE | Site: HIP | Status: FUNCTIONAL

## 2025-06-02 DEVICE — SELF CENTERING BI-POLAR HEAD 28MM ID 45MM OD
Type: IMPLANTABLE DEVICE | Site: HIP | Status: FUNCTIONAL
Brand: SELF CENTERING

## 2025-06-02 DEVICE — CEMENTRALIZER STEM CENTRALIZER 11.0MM CEMENTED
Type: IMPLANTABLE DEVICE | Site: HIP | Status: FUNCTIONAL
Brand: CEMENTRALIZER

## 2025-06-02 DEVICE — BONE PREPARATION KIT
Type: IMPLANTABLE DEVICE | Site: HIP
Brand: BIOPREP

## 2025-06-02 DEVICE — SUMMIT FEMORAL STEM 12/14 TAPER CEMENTED SIZE 6 STD 127MM
Type: IMPLANTABLE DEVICE | Site: HIP | Status: FUNCTIONAL
Brand: SUMMIT

## 2025-06-02 RX ORDER — SODIUM CHLORIDE 9 MG/ML
INJECTION, SOLUTION INTRAVENOUS CONTINUOUS PRN
Status: DISCONTINUED | OUTPATIENT
Start: 2025-06-02 | End: 2025-06-02 | Stop reason: SURG

## 2025-06-02 RX ORDER — NICOTINE POLACRILEX 4 MG
15 LOZENGE BUCCAL
Status: DISCONTINUED | OUTPATIENT
Start: 2025-06-02 | End: 2025-06-02 | Stop reason: HOSPADM

## 2025-06-02 RX ORDER — ONDANSETRON 2 MG/ML
INJECTION INTRAMUSCULAR; INTRAVENOUS AS NEEDED
Status: DISCONTINUED | OUTPATIENT
Start: 2025-06-02 | End: 2025-06-02 | Stop reason: SURG

## 2025-06-02 RX ORDER — CYCLOBENZAPRINE HCL 5 MG
5 TABLET ORAL EVERY 8 HOURS PRN
Status: DISCONTINUED | OUTPATIENT
Start: 2025-06-02 | End: 2025-06-04

## 2025-06-02 RX ORDER — METOCLOPRAMIDE HYDROCHLORIDE 5 MG/ML
5 INJECTION INTRAMUSCULAR; INTRAVENOUS EVERY 8 HOURS PRN
Status: DISCONTINUED | OUTPATIENT
Start: 2025-06-02 | End: 2025-06-04

## 2025-06-02 RX ORDER — DEXTROSE MONOHYDRATE 25 G/50ML
50 INJECTION, SOLUTION INTRAVENOUS
Status: DISCONTINUED | OUTPATIENT
Start: 2025-06-02 | End: 2025-06-02 | Stop reason: HOSPADM

## 2025-06-02 RX ORDER — HYDROMORPHONE HYDROCHLORIDE 1 MG/ML
0.2 INJECTION, SOLUTION INTRAMUSCULAR; INTRAVENOUS; SUBCUTANEOUS EVERY 5 MIN PRN
Status: DISCONTINUED | OUTPATIENT
Start: 2025-06-02 | End: 2025-06-02 | Stop reason: HOSPADM

## 2025-06-02 RX ORDER — ONDANSETRON 2 MG/ML
4 INJECTION INTRAMUSCULAR; INTRAVENOUS EVERY 6 HOURS PRN
Status: DISCONTINUED | OUTPATIENT
Start: 2025-06-02 | End: 2025-06-02 | Stop reason: HOSPADM

## 2025-06-02 RX ORDER — POLYETHYLENE GLYCOL 3350 17 G/17G
17 POWDER, FOR SOLUTION ORAL DAILY PRN
Status: DISCONTINUED | OUTPATIENT
Start: 2025-06-02 | End: 2025-06-04

## 2025-06-02 RX ORDER — NALOXONE HYDROCHLORIDE 0.4 MG/ML
80 INJECTION, SOLUTION INTRAMUSCULAR; INTRAVENOUS; SUBCUTANEOUS AS NEEDED
Status: DISCONTINUED | OUTPATIENT
Start: 2025-06-02 | End: 2025-06-02 | Stop reason: HOSPADM

## 2025-06-02 RX ORDER — SODIUM CHLORIDE 9 MG/ML
INJECTION, SOLUTION INTRAVENOUS CONTINUOUS
Status: DISCONTINUED | OUTPATIENT
Start: 2025-06-02 | End: 2025-06-03

## 2025-06-02 RX ORDER — CELECOXIB 200 MG/1
200 CAPSULE ORAL ONCE
OUTPATIENT
Start: 2025-06-02 | End: 2025-06-02

## 2025-06-02 RX ORDER — DIPHENHYDRAMINE HYDROCHLORIDE 50 MG/ML
12.5 INJECTION, SOLUTION INTRAMUSCULAR; INTRAVENOUS EVERY 4 HOURS PRN
Status: DISCONTINUED | OUTPATIENT
Start: 2025-06-02 | End: 2025-06-04

## 2025-06-02 RX ORDER — METOPROLOL SUCCINATE 25 MG/1
25 TABLET, EXTENDED RELEASE ORAL
Status: DISCONTINUED | OUTPATIENT
Start: 2025-06-02 | End: 2025-06-04

## 2025-06-02 RX ORDER — SODIUM PHOSPHATE, DIBASIC AND SODIUM PHOSPHATE, MONOBASIC 7; 19 G/230ML; G/230ML
1 ENEMA RECTAL ONCE AS NEEDED
Status: DISCONTINUED | OUTPATIENT
Start: 2025-06-02 | End: 2025-06-04

## 2025-06-02 RX ORDER — OXYCODONE HYDROCHLORIDE 5 MG/1
2.5 TABLET ORAL EVERY 4 HOURS PRN
Status: DISCONTINUED | OUTPATIENT
Start: 2025-06-02 | End: 2025-06-04

## 2025-06-02 RX ORDER — TRANEXAMIC ACID 10 MG/ML
1000 INJECTION, SOLUTION INTRAVENOUS ONCE
Status: CANCELLED | OUTPATIENT
Start: 2025-06-03 | End: 2025-06-03

## 2025-06-02 RX ORDER — SENNOSIDES 8.6 MG
17.2 TABLET ORAL NIGHTLY
Status: DISCONTINUED | OUTPATIENT
Start: 2025-06-02 | End: 2025-06-04

## 2025-06-02 RX ORDER — PHENYLEPHRINE HCL 10 MG/ML
VIAL (ML) INJECTION AS NEEDED
Status: DISCONTINUED | OUTPATIENT
Start: 2025-06-02 | End: 2025-06-02 | Stop reason: SURG

## 2025-06-02 RX ORDER — HYDROMORPHONE HYDROCHLORIDE 1 MG/ML
0.4 INJECTION, SOLUTION INTRAMUSCULAR; INTRAVENOUS; SUBCUTANEOUS EVERY 5 MIN PRN
Status: DISCONTINUED | OUTPATIENT
Start: 2025-06-02 | End: 2025-06-02 | Stop reason: HOSPADM

## 2025-06-02 RX ORDER — ENOXAPARIN SODIUM 100 MG/ML
30 INJECTION SUBCUTANEOUS DAILY
Status: DISCONTINUED | OUTPATIENT
Start: 2025-06-02 | End: 2025-06-04

## 2025-06-02 RX ORDER — ONDANSETRON 2 MG/ML
4 INJECTION INTRAMUSCULAR; INTRAVENOUS EVERY 6 HOURS PRN
Status: DISCONTINUED | OUTPATIENT
Start: 2025-06-02 | End: 2025-06-04

## 2025-06-02 RX ORDER — SODIUM CHLORIDE 9 MG/ML
INJECTION, SOLUTION INTRAVENOUS CONTINUOUS
Status: DISCONTINUED | OUTPATIENT
Start: 2025-06-02 | End: 2025-06-02 | Stop reason: HOSPADM

## 2025-06-02 RX ORDER — ACETAMINOPHEN 500 MG
1000 TABLET ORAL EVERY 8 HOURS SCHEDULED
Status: DISCONTINUED | OUTPATIENT
Start: 2025-06-02 | End: 2025-06-04

## 2025-06-02 RX ORDER — MORPHINE SULFATE 4 MG/ML
4 INJECTION, SOLUTION INTRAMUSCULAR; INTRAVENOUS EVERY 10 MIN PRN
Status: DISCONTINUED | OUTPATIENT
Start: 2025-06-02 | End: 2025-06-02 | Stop reason: HOSPADM

## 2025-06-02 RX ORDER — OXYCODONE HYDROCHLORIDE 5 MG/1
5 TABLET ORAL EVERY 4 HOURS PRN
Status: DISCONTINUED | OUTPATIENT
Start: 2025-06-02 | End: 2025-06-04

## 2025-06-02 RX ORDER — NICOTINE POLACRILEX 4 MG
30 LOZENGE BUCCAL
Status: DISCONTINUED | OUTPATIENT
Start: 2025-06-02 | End: 2025-06-02 | Stop reason: HOSPADM

## 2025-06-02 RX ORDER — DIPHENHYDRAMINE HCL 25 MG
25 CAPSULE ORAL EVERY 4 HOURS PRN
Status: DISCONTINUED | OUTPATIENT
Start: 2025-06-02 | End: 2025-06-04

## 2025-06-02 RX ORDER — KETOROLAC TROMETHAMINE 15 MG/ML
15 INJECTION, SOLUTION INTRAMUSCULAR; INTRAVENOUS EVERY 6 HOURS
Status: DISCONTINUED | OUTPATIENT
Start: 2025-06-02 | End: 2025-06-03

## 2025-06-02 RX ORDER — TRANEXAMIC ACID 10 MG/ML
1000 INJECTION, SOLUTION INTRAVENOUS ONCE
Status: COMPLETED | OUTPATIENT
Start: 2025-06-02 | End: 2025-06-02

## 2025-06-02 RX ORDER — TRANEXAMIC ACID 10 MG/ML
INJECTION, SOLUTION INTRAVENOUS AS NEEDED
Status: DISCONTINUED | OUTPATIENT
Start: 2025-06-02 | End: 2025-06-02 | Stop reason: SURG

## 2025-06-02 RX ORDER — DIPHENHYDRAMINE HYDROCHLORIDE 50 MG/ML
25 INJECTION, SOLUTION INTRAMUSCULAR; INTRAVENOUS ONCE AS NEEDED
Status: ACTIVE | OUTPATIENT
Start: 2025-06-02 | End: 2025-06-02

## 2025-06-02 RX ORDER — BISACODYL 10 MG
10 SUPPOSITORY, RECTAL RECTAL
Status: DISCONTINUED | OUTPATIENT
Start: 2025-06-02 | End: 2025-06-04

## 2025-06-02 RX ORDER — LABETALOL HYDROCHLORIDE 5 MG/ML
INJECTION, SOLUTION INTRAVENOUS AS NEEDED
Status: DISCONTINUED | OUTPATIENT
Start: 2025-06-02 | End: 2025-06-02 | Stop reason: SURG

## 2025-06-02 RX ORDER — DOCUSATE SODIUM 100 MG/1
100 CAPSULE, LIQUID FILLED ORAL 2 TIMES DAILY
Status: DISCONTINUED | OUTPATIENT
Start: 2025-06-02 | End: 2025-06-04

## 2025-06-02 RX ORDER — ROCURONIUM BROMIDE 10 MG/ML
INJECTION, SOLUTION INTRAVENOUS AS NEEDED
Status: DISCONTINUED | OUTPATIENT
Start: 2025-06-02 | End: 2025-06-02 | Stop reason: SURG

## 2025-06-02 RX ORDER — HYDROMORPHONE HYDROCHLORIDE 1 MG/ML
0.4 INJECTION, SOLUTION INTRAMUSCULAR; INTRAVENOUS; SUBCUTANEOUS EVERY 2 HOUR PRN
Status: DISCONTINUED | OUTPATIENT
Start: 2025-06-02 | End: 2025-06-04

## 2025-06-02 RX ORDER — HYDROMORPHONE HYDROCHLORIDE 1 MG/ML
0.2 INJECTION, SOLUTION INTRAMUSCULAR; INTRAVENOUS; SUBCUTANEOUS EVERY 2 HOUR PRN
Status: DISCONTINUED | OUTPATIENT
Start: 2025-06-02 | End: 2025-06-04

## 2025-06-02 RX ORDER — LIDOCAINE HYDROCHLORIDE 10 MG/ML
INJECTION, SOLUTION EPIDURAL; INFILTRATION; INTRACAUDAL; PERINEURAL AS NEEDED
Status: DISCONTINUED | OUTPATIENT
Start: 2025-06-02 | End: 2025-06-02 | Stop reason: SURG

## 2025-06-02 RX ORDER — MORPHINE SULFATE 4 MG/ML
2 INJECTION, SOLUTION INTRAMUSCULAR; INTRAVENOUS EVERY 10 MIN PRN
Status: DISCONTINUED | OUTPATIENT
Start: 2025-06-02 | End: 2025-06-02 | Stop reason: HOSPADM

## 2025-06-02 RX ORDER — LABETALOL HYDROCHLORIDE 5 MG/ML
5 INJECTION, SOLUTION INTRAVENOUS EVERY 10 MIN PRN
Status: DISCONTINUED | OUTPATIENT
Start: 2025-06-02 | End: 2025-06-02 | Stop reason: HOSPADM

## 2025-06-02 RX ADMIN — PHENYLEPHRINE HCL 150 MCG: 10 MG/ML VIAL (ML) INJECTION at 15:21:00

## 2025-06-02 RX ADMIN — PHENYLEPHRINE HCL 100 MCG: 10 MG/ML VIAL (ML) INJECTION at 14:23:00

## 2025-06-02 RX ADMIN — ROCURONIUM BROMIDE 20 MG: 10 INJECTION, SOLUTION INTRAVENOUS at 14:22:00

## 2025-06-02 RX ADMIN — PHENYLEPHRINE HCL 50 MCG: 10 MG/ML VIAL (ML) INJECTION at 14:25:00

## 2025-06-02 RX ADMIN — SODIUM CHLORIDE: 9 INJECTION, SOLUTION INTRAVENOUS at 13:54:00

## 2025-06-02 RX ADMIN — LABETALOL HYDROCHLORIDE 5 MG: 5 INJECTION, SOLUTION INTRAVENOUS at 14:46:00

## 2025-06-02 RX ADMIN — SODIUM CHLORIDE: 9 INJECTION, SOLUTION INTRAVENOUS at 14:12:00

## 2025-06-02 RX ADMIN — ONDANSETRON 4 MG: 2 INJECTION INTRAMUSCULAR; INTRAVENOUS at 15:19:00

## 2025-06-02 RX ADMIN — SODIUM CHLORIDE: 9 INJECTION, SOLUTION INTRAVENOUS at 15:34:00

## 2025-06-02 RX ADMIN — SODIUM CHLORIDE: 9 INJECTION, SOLUTION INTRAVENOUS at 14:55:00

## 2025-06-02 RX ADMIN — PHENYLEPHRINE HCL 50 MCG: 10 MG/ML VIAL (ML) INJECTION at 15:02:00

## 2025-06-02 RX ADMIN — ROCURONIUM BROMIDE 20 MG: 10 INJECTION, SOLUTION INTRAVENOUS at 13:58:00

## 2025-06-02 RX ADMIN — PHENYLEPHRINE HCL 50 MCG: 10 MG/ML VIAL (ML) INJECTION at 15:11:00

## 2025-06-02 RX ADMIN — PHENYLEPHRINE HCL 100 MCG: 10 MG/ML VIAL (ML) INJECTION at 15:26:00

## 2025-06-02 RX ADMIN — LIDOCAINE HYDROCHLORIDE 50 MG: 10 INJECTION, SOLUTION EPIDURAL; INFILTRATION; INTRACAUDAL; PERINEURAL at 13:57:00

## 2025-06-02 RX ADMIN — TRANEXAMIC ACID 1000 MG: 10 INJECTION, SOLUTION INTRAVENOUS at 14:20:00

## 2025-06-02 RX ADMIN — PHENYLEPHRINE HCL 100 MCG: 10 MG/ML VIAL (ML) INJECTION at 15:17:00

## 2025-06-02 NOTE — PAYOR COMM NOTE
--------------  ADMISSION REVIEW     [place]or: [place]  Subscriber #:  [id number]  Authorization Number: [id number]    Admit date: 6/1/25  Admit time:  6:22 PM       REVIEW DOCUMENTATION:     ED Provider Notes        ED Provider Notes signed by Rina Markham MD at 6/1/2025  7:24 PM       Author: Rina Markham MD Service: Emergency Medicine Author Type: Physician    Filed: 6/1/2025  7:24 PM Date of Service: 6/1/2025  5:24 PM Status: Signed    : Rina Markham MD (Physician)           Patient Seen in: [facility] Emergency Department        History  Chief Complaint   Patient presents with    Leg or Foot Injury    Arrythmia/Palpitations    Fall     Stated Complaint: L knee pain    Subjective:   HPI            89-year-old female with history of TAVR, CAD, diastolic heart failure, pulmonary hypertension, anemia,  presents for evaluation of left leg pain. She has been struggling with left knee pain for a couple of weeks.  She was evaluated in outside ED and left knee x-ray showed arthritis and DVT study was negative.  She followed up with orthopedics and had a steroid injection of the left knee but only got minimal relief.  Today she was walking, lost her balance and fell.  Since then she has pain at the low back radiating into the left thigh and knee.  Additionally she complained of palpitations, lightheaded, and sweating at the forehead and palms, though no chest pain or dyspnea.    Objective:     Past Medical History:    Claustrophobia    Deep vein thrombosis (HCC)    Right leg    Hyperglycemia    Hyperlipidemia    MENOPAUSE    OSTEOARTHRITIS    back    Urge incontinence       Physical Exam    ED Triage Vitals   BP 06/01/25 1608 (!) 188/65   Pulse 06/01/25 1608 98   Resp 06/01/25 1608 22   Temp 06/01/25 1610 98.5 °F (36.9 °C)   Temp src 06/01/25 1610 Oral   SpO2 06/01/25 1610 97 %   O2 Device 06/01/25 1608 None (Room air)       Current Vitals:   Vital Signs  BP: (!) 180/69 (Pain severe, pain  medication to be given)  Pulse: 99  Resp: 20  Temp: 98.7 °F (37.1 °C)  Temp src: Oral  MAP (mmHg): (!) 101    Oxygen Therapy  SpO2: 99 %  O2 Device: None (Room air)            Physical Exam  Vitals and nursing note reviewed.   Constitutional:       Appearance: She is well-developed.   HENT:      Head: Normocephalic and atraumatic.   Eyes:      Extraocular Movements: Extraocular movements intact.   Cardiovascular:      Rate and Rhythm: Normal rate and regular rhythm.      Heart sounds: Normal heart sounds.      Comments: Bilateral DP and PT pulses 2+  Pulmonary:      Effort: Pulmonary effort is normal.      Breath sounds: Normal breath sounds.   Abdominal:      General: There is no distension.      Palpations: Abdomen is soft.      Tenderness: There is no abdominal tenderness.   Musculoskeletal:      Cervical back: Normal range of motion.      Comments: +TTP at the left low back and L knee with small effusion. Diminshed ROM of the L hip and knee 2/2 pain. Muscular compartments soft throughout the LLE   Skin:     General: Skin is warm.      Capillary Refill: Capillary refill takes less than 2 seconds.   Neurological:      Mental Status: She is alert.      Comments: No focal deficits       Differential diagnosis includes but is not limited to lumbosacral radiculopathy, osteoarthritis, fracture, neuropathy        ED Course  Labs Reviewed   BASIC METABOLIC PANEL (8) - Abnormal; Notable for the following components:       Result Value    Glucose 239 (*)     Potassium 3.3 (*)     BUN/CREA Ratio 27.0 (*)     Calculated Osmolality 296 (*)     All other components within normal limits   CBC WITH DIFFERENTIAL WITH PLATELET - Abnormal; Notable for the following components:    WBC 19.5 (*)     RBC 3.38 (*)     HGB 9.6 (*)     HCT 31.9 (*)     MCHC 30.1 (*)     Neutrophil Absolute Prelim 17.41 (*)     Neutrophil Absolute 17.41 (*)     Lymphocyte Absolute 0.50 (*)     Monocyte Absolute 1.28 (*)     All other components within  normal limits   TROPONIN I HIGH SENSITIVITY - Normal   PROTHROMBIN TIME (PT) - Normal   PTT, ACTIVATED - Normal   URINALYSIS WITH CULTURE REFLEX   RAINBOW DRAW LAVENDER   RAINBOW DRAW LIGHT GREEN   MRSA/SA SCRN BY PCR:[facility] ORTHO SURG ONLY     EKG    Rate, intervals and axes as noted on EKG Report.  Rate: 97  Rhythm: Sinus Rhythm  Reading: No STEMI, diffuse ST depressions present, incomplete right bundle branch block, abnormal EKG      XR LUMBAR SPINE (MIN 2 VIEWS) (CPT=72100)  Result Date: 6/1/2025  CONCLUSION:  1. No radiographically apparent acute lumbar spine compression fracture. 2. Multilevel lumbar spine degenerative changes with levoscoliosis of the lumbar spine and degenerative anterolisthesis of L5 relative to S1.   Dictated by (CST): Cesario Huynh MD on 6/01/2025 at 5:09 PM     Finalized by (CST): Cesario Huynh MD on 6/01/2025 at 5:11 PM          XR HIP W OR WO PELVIS 2 OR 3 VIEWS, LEFT (CPT=73502)  Result Date: 6/1/2025   Acute displaced and mildly impacted left femoral neck fracture.  Mild bilateral hip osteoarthritis.  Demineralization.  Atherosclerosis.  Osteitis pubis.  Partially imaged lower lumbar spine degeneration.    Dictated by (CST): Cesario Huynh MD on 6/01/2025 at 5:08 PM     Finalized by (CST): Cesario Huynh MD on 6/01/2025 at 5:09 PM          XR KNEE (1 OR 2 VIEWS), LEFT (CPT=73560)  Result Date: 6/1/2025  CONCLUSION:  1. No radiographically visible acute osseous injury of the left knee. 2. Mild-to-moderate tricompartmental osteoarthritis.  There is also chondrocalcinosis, which can be seen in the setting of coexistent crystal deposition arthropathy.   Dictated by (CST): Cesario Huynh MD on 6/01/2025 at 5:07 PM     Finalized by (CST): Cesario Huynh MD on 6/01/2025 at 5:08 PM               Admission disposition: 6/1/2025  5:44 PM           Medical Decision Making  On arrival to the ED, patient noted to be hypertensive with other vital signs stable.  EKG with ST depressions as  above.  No ST elevations, no chest pain, troponin within normal limits.  Left hip x-ray per my independent interpretation is concerning for femoral neck fracture. D/w Dr Ford with orthopedic surgery who requests cardiac clearance and n.p.o. at midnight. Dr Raymundo with cardiology consulted. D/w Dr Alejandro for admission.      Problems Addressed:  Closed fracture of neck of left femur, initial encounter ([facility]): complicated acute illness or injury with systemic symptoms that poses a threat to life or bodily functions  Palpitations: complicated acute illness or injury with systemic symptoms that poses a threat to life or bodily functions    Amount and/or Complexity of Data Reviewed  Labs: ordered. Decision-making details documented in ED Course.  Radiology: ordered and independent interpretation performed. Decision-making details documented in ED Course.  ECG/medicine tests: ordered and independent interpretation performed. Decision-making details documented in ED Course.  Discussion of management or test interpretation with external provider(s): As above    Risk  Decision regarding hospitalization.        Disposition and Plan     Clinical Impression:  1. Closed fracture of neck of left femur, initial encounter ([facility])    2. Palpitations         Disposition:  Admit  6/1/2025  5:44 pm    Hospital Problems       Present on Admission  Date Reviewed: 5/15/2025          ICD-10-CM Noted POA    * (Principal) Closed fracture of neck of left femur, initial encounter ([facility]) S72.002A 6/1/2025 Unknown    Palpitations R00.2 6/1/2025 Unknown             6/1 H&P    History of Present Illness: Patient is a 89 year old female with PMH DVT, hyperlipidemia, GIST, iron deficiency anemia, history of coronary artery disease with PCI in the past aortic stenosis status post TAVR who presents with mechanical fall and left hip fracture.  Earlier last week patient started having left knee pain she saw  As an outpatient underwent left knee injection  this seemed to help 10% with regards to her left knee pain, she had an x-ray today in the emergency room of the knee which did not reveal any fractures but some osteoarthritis and chondrocalcinosis.  She lives alone she uses a walker or cane to get around today she lost her balance due to knee pain and fell on her left side she came to the hospital for further evaluation x-ray of the hip revealed acute displaced and mildly impacted left femoral neck fracture.         Patient is being mated for further operative management per Ortho and cardiology service was consulted due to episode of palpitations and abnormal EKG findings given cardiac history for preop cardiac evaluation.      At this time patient is unsure if she would like to proceed with surgery however she has a goal in mind to continue ambulating independently and I explained to her that surgery may be required to obtain this goal.     Patient is a 89 year old female with PMH DVT, hyperlipidemia, GIST, iron deficiency anemia, history of coronary artery disease with PCI in the past aortic stenosis status post TAVR who presents with mechanical fall and left hip fracture.     Mechanical fall with resulting acute displaced mildly impacted left femoral neck fracture  - Ortho consulted for possible operative management discussed risks and benefits with patient she has desire to continue ambulating would consider operative management after discussion with Ortho  Currently only taking aspirin did not take any aspirin today per her report  No longer taking oral blood thinners  Will keep n.p.o. at midnight pending Ortho eval  Given cardiac history cardiology service has been consulted for preoperative cardiac risk assessment     2.  Coronary artery disease history of PCI in the past aortic stenosis status post TAVR  - Hold home aspirin  Hold home Lasix due to slightly elevated BUN to creatinine ratio  Will give general fluids overnight at 30 an hour for 12  hours  Continue home statin     3.  Chronic pain muscle aches okay to continue tizanidine     GERD no longer taking PPI     Left knee pain exam appears reassuring  Outpatient follow-up recommended on the calcinosis     FN:  - IVF: Saline at 30 an hour while NPO  - Diet: Okay for diet this evening but n.p.o. after midnight     DVT Prophy: Hold in anticipation for surgery  Lines: Peripheral IV            6/2  Procedure(s):  LEFT HIP HEMIARTHROPLASTY POSSIBLE LEFT TOTAL HIP ARTHOPLASTY  Indication: Fracture of femoral head (HCC) [S72.059A]        MEDICATIONS ADMINISTERED IN LAST 1 DAY:  atorvastatin (Lipitor) tab 10 mg       Date Action Dose Route User    6/2/2025 0935 Given 10 mg Oral Sisi Trinidad RN          fentaNYL (Sublimaze) 50 mcg/mL injection       Date Action Dose Route User    6/2/2025 1439 Given 50 mcg Intravenous Chrissy Kaur MD    6/2/2025 1414 Given 50 mcg Intravenous Chrissy Kaur MD    6/2/2025 1357 Given 50 mcg Intravenous Chrissy Kaur MD          HYDROcodone-acetaminophen (Norco) 5-325 MG per tab 1 tablet       Date Action Dose Route User    6/2/2025 0603 Given 1 tablet Oral Carr, Lila    6/2/2025 0011 Given 1 tablet Oral Carr, Lila    6/1/2025 1851 Given 1 tablet Oral AntoineIgnacio amaya RN          ketorolac (Toradol) 15 MG/ML injection 15 mg       Date Action Dose Route User    6/1/2025 1710 Given 15 mg Intravenous Tanika Gonzalez RN          labetalol (Trandate) 5 mg/mL injection       Date Action Dose Route User    6/2/2025 1446 Given 5 mg Intravenous Chrissy Kaur MD          lidocaine PF (Xylocaine-MPF) 1% injection       Date Action Dose Route User    6/2/2025 1357 Given 50 mg Intravenous Chrissy Kaur MD          lidocaine-menthol 4-1 % patch 1 patch       Date Action Dose Route User    6/2/2025 0935 Patch Applied 1 patch Transdermal (Left Leg) Sisi Trinidad RN    6/1/2025 1714 Patch Applied 1 patch Transdermal (Left Lower Back) Tanika Gonzalez RN           metoprolol succinate ER (Toprol XL) 24 hr tab 25 mg       Date Action Dose Route User    6/2/2025 0947 Given 25 mg Oral Sisi Trinidad RN          morphINE PF 2 MG/ML injection 2 mg       Date Action Dose Route User    6/2/2025 0935 Given 2 mg Intravenous Sisi Trinidad RN          tranexamic acid (Cyklokapron) 1,000 mg in sodium chloride 0.9% 100 mL irrigation (NOT FOR IV USE)       Date Action Dose Route User    6/2/2025 1507 Given 1,000 mg Irrigation (Left Hip) Randall Boateng,           phenylephrine (Tushar-Synephrine) 10 MG/ML injection       Date Action Dose Route User    6/2/2025 1511 Given 50 mcg Intravenous Saba Herron CRNA    6/2/2025 1502 Given 50 mcg Intravenous Saba Herron CRNA    6/2/2025 1425 Given 50 mcg Intravenous Chrissy Kaur MD    6/2/2025 1423 Given 100 mcg Intravenous Chrissy Kaur MD          potassium chloride (Klor-Con M20) tab 40 mEq       Date Action Dose Route User    6/1/2025 2044 Given 40 mEq Oral Carr, Lila          propofol (Diprivan) 10 MG/ML injection       Date Action Dose Route User    6/2/2025 1438 Given 50 mg Intravenous Chrissy Kaur MD    6/2/2025 1429 Given 50 mg Intravenous Chrissy Kaur MD    6/2/2025 1357 Given 100 mg Intravenous Chrissy Kaur MD          rocuronium (Zemuron) 50 mg/5mL injection       Date Action Dose Route User    6/2/2025 1422 Given 20 mg Intravenous Chrissy Kaur MD    6/2/2025 1358 Given 20 mg Intravenous Chrissy Kaur MD          sodium chloride 0.9% infusion       Date Action Dose Route User    6/2/2025 1354 Restarted (none) Intravenous Chrissy Kaur MD    6/2/2025 1353 Continued by Anesthesia (none) Intravenous Chrissy Kaur MD    6/1/2025 1852 New Bag (none) Intravenous Ignacio Antoine RN          sodium chloride 0.9% infusion       Date Action Dose Route User    6/2/2025 1412 New Bag (none) Intravenous Chrissy Kaur MD          tranexamic acid in sodium chloride 0.7%  (Cyklokapron) 1000 mg/100mL infusion premix       Date Action Dose Route User    6/2/2025 1420 Given 1,000 mg Intravenous Chrissy Kaur MD          vancomycin (Vancocin) 1,000 mg in sodium chloride 0.9% 250 mL IVPB-ADDV       Date Action Dose Route User    6/2/2025 1332 New Bag 1,000 mg Intravenous Nguyen Montero, GASTON            Vitals (last day)       Date/Time Temp Pulse Resp BP SpO2 Weight O2 Device O2 Flow Rate (L/min) [facility]    06/02/25 1318 98.1 °F (36.7 °C) 87 18 171/60 91 % -- None (Room air) --     06/02/25 1128 -- 92 -- 165/68 -- -- -- --     06/02/25 0946 -- 95 -- 162/61 99 % -- None (Room air) --     06/02/25 0717 99.5 °F (37.5 °C) 101 18 174/80 95 % -- None (Room air) --     06/02/25 0240 -- -- -- 157/66 -- -- -- -- MYAH    06/02/25 0211 98.3 °F (36.8 °C) 96 18 163/87 98 % -- None (Room air) --     06/01/25 1953 -- 102 18 164/63 95 % -- None (Room air) -- IM    06/01/25 1839 -- -- -- -- -- 97 lb 9.6 oz (44.3 kg) -- -- RP    06/01/25 1833 98.7 °F (37.1 °C) 99 20 180/69 99 % -- None (Room air) -- RP    06/01/25 1715 -- 103 23 175/69 99 % -- None (Room air) -- EO    06/01/25 1700 -- 99 26 186/90 100 % -- None (Room air) -- EO    06/01/25 1610 98.5 °F (36.9 °C) -- -- -- 97 % -- None (Room air) -- EO    06/01/25 1608 -- 98 22 188/65 -- 94 lb (42.6 kg) None (Room air) -- EO          CIWA Scores (since admission)       None

## 2025-06-02 NOTE — PLAN OF CARE
Patient is A&Ox4. RA. SCD's on for DVT prophylaxis. NPO since midnight. NS infusing at 30 ml/hr. Voiding. Denies s/s of urinary retention. Michael heels elevated. Bed locked and lowered. Call light within reach. Plan for surgery today.    Problem: Patient Centered Care  Goal: Patient preferences are identified and integrated in the patient's plan of care  Description: Interventions:  - What would you like us to know as we care for you?   - Provide timely, complete, and accurate information to patient/family  - Incorporate patient and family knowledge, values, beliefs, and cultural backgrounds into the planning and delivery of care  - Encourage patient/family to participate in care and decision-making at the level they choose  - Honor patient and family perspectives and choices  Outcome: Progressing     Problem: Patient/Family Goals  Goal: Patient/Family Long Term Goal  Description: Patient's Long Term Goal:     Interventions:  -   - See additional Care Plan goals for specific interventions  Outcome: Progressing  Goal: Patient/Family Short Term Goal  Description: Patient's Short Term Goal:     Interventions:   -   - See additional Care Plan goals for specific interventions  Outcome: Progressing

## 2025-06-02 NOTE — ANESTHESIA PROCEDURE NOTES
Airway  Date/Time: 6/2/2025 2:01 PM  Reason: Elective    Airway not difficult    General Information and Staff   Patient location during procedure: OR  Anesthesiologist: Chrissy Kaur MD  Performed: anesthesiologist   Performed by: Chrissy Kaur MD  Authorized by: Chrissy Kaur MD        Indications and Patient Condition  Indications for airway management: anesthesia  Sedation level: deep      Preoxygenated: yesPatient position: ramp    Mask difficulty assessment: 1 - vent by mask  Planned trial extubation    Final Airway Details    Final airway type: endotracheal airway    Successful airway: ETT  Cuffed: yes   Successful intubation technique: Video laryngoscopy  Facilitating devices/methods: intubating stylet and cricoid pressure  Endotracheal tube insertion site: oral  Blade: GlideScope  Blade size: #3  ETT size (mm): 7.0    Cormack-Lehane Classification: grade I - full view of glottis  Placement verified by: capnometry   Measured from: lips  ETT to lips (cm): 21  Number of attempts at approach: 1    Additional Comments  Intubated easily on first attempt, no dental or soft tissue damage. No signs of aspiration.   Neck kept in neutral position throughout

## 2025-06-02 NOTE — ANESTHESIA PROCEDURE NOTES
Arterial Line    Date/Time: 6/2/2025 2:09 PM    Performed by: Chrissy Kaur MD  Authorized by: Chrissy Kaur MD    General Information and Staff    Procedure Start:  6/2/2025 2:09 PM  Procedure End:  6/2/2025 2:11 PM  Anesthesiologist:  Chrissy Kaur MD  Performed By:  Anesthesiologist  Patient Location:  OR  Indication: continuous blood pressure monitoring and blood sampling needed    Site Identification: surface landmarks    Preanesthetic Checklist: 2 patient identifiers, IV checked, risks and benefits discussed, monitors and equipment checked, pre-op evaluation, timeout performed, anesthesia consent and sterile technique used    Procedure Details    Catheter Size:  20 G  Catheter Length:  1 and 3/4 inch  Catheter Type:  Arrow  Seldinger Technique?: Yes    Laterality:  Left  Site:  Radial artery  Site Prep: chlorhexidine    Line Secured:  Wrist Brace, tape and Tegaderm    Assessment    Events: patient tolerated procedure well with no complications      Medications  6/2/2025 2:09 PM      Additional Comments

## 2025-06-02 NOTE — ANESTHESIA PREPROCEDURE EVALUATION
Anesthesia PreOp Note    HPI:     Joana Antony is a 89 year old female who presents for preoperative consultation requested by: Randall Boateng DO    Date of Surgery: 6/1/2025 - 6/2/2025    Procedure(s):  LEFT HIP HEMIARTHROPLASTY POSSIBLE LEFT TOTAL HIP ARTHOPLASTY  Indication: Fracture of femoral head (HCC) [S72.059A]    Relevant Problems   No relevant active problems       NPO:  Last Liquid Consumption Date: 06/02/25  Last Liquid Consumption Time: 0947 (sip w med)  Last Solid Consumption Date: 06/01/25  Last Solid Consumption Time: 1100  Last Liquid Consumption Date: 06/02/25          History Review:  Patient Active Problem List    Diagnosis Date Noted    Closed fracture of neck of left femur, initial encounter ([facility]) 06/01/2025    Palpitations 06/01/2025    Acute deep vein thrombosis (DVT) of femoral vein of right lower extremity ([facility]) 02/25/2023    Anemia, unspecified type 02/25/2023    Protein-calorie malnutrition, unspecified severity ([facility]) 12/13/2021    Sprain of sacroiliac ligament, subsequent encounter 06/29/2021    Low back pain at multiple sites 07/18/2018    Aortic stenosis, moderate 06/12/2018    Claustrophobia 03/01/2018    Drusen (degenerative) of macula, bilateral 03/01/2018    Femoral hernia of left side 01/19/2018    Sacroiliac joint pain 11/03/2016    Coccydynia 05/05/2016    L5-S1 left severe/right mod-severe, L4-5 right mod-severe, L3-4 right mod, L2-3 left mod foraminal stenosis 02/11/2016    bilateral L5-S1 radiculopathies 02/11/2016    Right hand pain 02/11/2016    Unspecified arthropathy, shoulder region 08/25/2015    Pain in joint of left shoulder 08/25/2015    Brachial neuritis or radiculitis NOS 07/07/2015    Bilateral leg edema 06/05/2015    L4-5 & L5-S1 right sided laminectomy 04/16/2015    Uterine mass 03/27/2015    Spondylolisthesis at L4-L5 level grade 1-2 unstable 03/23/2015    Spondylolisthesis at L3-L4 level grade 1 unstable 03/23/2015    Spondylolisthesis at L5-S1  level grade 1-2 slightly unstable 03/23/2015    L4-5 right large HNP 01/28/2015    L5-S1 mod diffuse & right large far lateral, L4-5 mod diffuse, L3-4 right mod, L2-3 mod diffuse bugling discs 01/05/2015    L4-5 mod central, L3-4 mod-severe central, L2-3 mod central stenosis 01/05/2015    Abnormal CT scan, chest 10/27/2014    Abnormal CXR 10/21/2014    Urge incontinence 03/31/2008    Osteoarthrosis, unspecified whether generalized or localized, lower leg 03/31/2008       Past Medical History[1]    Past Surgical History[2]    Prescriptions Prior to Admission[3]  Current Medications and Prescriptions Ordered in Epic[4]    Allergies[5]    Family History[6]  Social Hx on file[7]    Available pre-op labs reviewed.  Lab Results   Component Value Date    WBC 12.5 (H) 06/02/2025    RBC 3.39 (L) 06/02/2025    HGB 9.4 (L) 06/02/2025    HCT 32.0 (L) 06/02/2025    MCV 94.4 06/02/2025    MCH 27.7 06/02/2025    MCHC 29.4 (L) 06/02/2025    RDW 13.2 06/02/2025    .0 06/02/2025     Lab Results   Component Value Date     06/02/2025    K 4.3 06/02/2025    K 4.3 06/02/2025     06/02/2025    CO2 30.0 06/02/2025    BUN 16 06/02/2025    CREATSERUM 0.59 06/02/2025     (H) 06/02/2025    CA 9.5 06/02/2025     Lab Results   Component Value Date    INR 0.97 06/01/2025       Vital Signs:  Body mass index is 19.06 kg/m².   height is 1.524 m (5') and weight is 44.3 kg (97 lb 9.6 oz). Her oral temperature is 98.1 °F (36.7 °C). Her blood pressure is 171/60 (abnormal) and her pulse is 87. Her respiration is 18 and oxygen saturation is 91%.   Vitals:    06/02/25 0717 06/02/25 0946 06/02/25 1128 06/02/25 1318   BP: (!) 174/80 (!) 162/61 (!) 165/68 (!) 171/60   Pulse: 101 95 92 87   Resp: 18   18   Temp: 99.5 °F (37.5 °C)   98.1 °F (36.7 °C)   TempSrc: Oral   Oral   SpO2: 95% 99%  91%   Weight:       Height:            Anesthesia Evaluation     Patient summary reviewed and Nursing notes reviewed    No history of anesthetic  complications   Airway   Mallampati: II  TM distance: >3 FB  Neck ROM: full  Dental - Dentition appears grossly intact     Pulmonary - normal exam   Cardiovascular - normal exam  (+) valvular problems/murmurs (history of AS s/p TAVR) AS, CAD    Neuro/Psych    (+)  neuromuscular disease,        Comments: Chronic muscle pain    GI/Hepatic/Renal    (+) GERD    Endo/Other    (+) blood dyscrasia (anemia), arthritis  Abdominal   (+) scaphoid                 Anesthesia Plan:   ASA:  4  Plan:   General  Monitors and Lines:   A-line, Additonal IV and BIS  Airway:  ETT and Video laryngoscope  Post-op Pain Management: IV analgesics  Plan Comments: Cardiac clearance in chart. NPO for surgery.  Informed Consent Plan and Risks Discussed With:  Patient  Use of Blood Products Discussed With:  Patient  Blood Product Use Consented    Discussed plan with:  Surgeon      I have informed Joana ELIJAH Arpan and/or legal guardian or family member of the nature of the anesthetic plan, benefits, risks including possible dental damage if relevant, major complications, and any alternative forms of anesthetic management.   All of the patient's questions were answered to the best of my ability. The patient desires the anesthetic management as planned.  Chrissy Kaur MD  6/2/2025 1:42 PM  Present on Admission:  **None**           [1]   Past Medical History:   Claustrophobia    Deep vein thrombosis (HCC)    Right leg    Hyperglycemia    Hyperlipidemia    MENOPAUSE    OSTEOARTHRITIS    back    Urge incontinence   [2]   Past Surgical History:  Procedure Laterality Date    Back surgery  4/8/2015    L4-5 microdiskectomy    Cataract  10/1/14    right    Fluor gid & loclzj ndl/cath spi dx/ther njx N/A 1/6/2015    Procedure: LUMBAR EPIDURAL;  Surgeon: Carlos Snowden MD;  [place] [facility]    Fluor gid & loclzj ndl/cath spi dx/ther njx N/A 1/19/2015    Procedure: LUMBAR EPIDURAL;  Surgeon: Carlos Snowden MD;  [place] [facility] [place]  [place] [facility]    Fluor gid & loclzj ndl/cath spi dx/ther njx N/A 1/28/2015    Procedure: LUMBAR EPIDURAL;  Surgeon: Carlos Snowden MD;  Location: [facility]    Injection, w/wo contrast, dx/therapeutic substance, epidural/subarachnoid; lumbar/sacral N/A 1/6/2015    Procedure: LUMBAR EPIDURAL;  Surgeon: Carlos Snowden MD;  Location: [facility]    Injection, w/wo contrast, dx/therapeutic substance, epidural/subarachnoid; lumbar/sacral N/A 1/19/2015    Procedure: LUMBAR EPIDURAL;  Surgeon: Carlos Snowden MD;  Location: [facility]    Injection, w/wo contrast, dx/therapeutic substance, epidural/subarachnoid; lumbar/sacral N/A 1/28/2015    Procedure: LUMBAR EPIDURAL;  Surgeon: Carlos Snowden MD;  Location: [facility]    Patient documented not to have experienced any of the following events N/A 1/6/2015    Procedure: LUMBAR EPIDURAL;  Surgeon: Carlos Snowden MD;  Location: [facility]    Patient documented not to have experienced any of the following events N/A 1/19/2015    Procedure: LUMBAR EPIDURAL;  Surgeon: Carlos Snowden MD;  Location: [facility]    Patient documented not to have experienced any of the following events N/A 1/28/2015    Procedure: LUMBAR EPIDURAL;  Surgeon: Carlos Snowden MD;  Location: [facility]    Patient withough preoperative order for iv antibiotic surgical site infection prophylaxis. N/A 1/6/2015    Procedure: LUMBAR EPIDURAL;  Surgeon: Carlos Snowden MD;  Location: [facility]    Patient withough preoperative order for iv antibiotic surgical site infection prophylaxis. N/A 1/19/2015    Procedure: LUMBAR EPIDURAL;  Surgeon: Carlos Snowden MD;  Location: [facility]    Patient withough preoperative order for iv antibiotic surgical site infection prophylaxis. N/A 1/28/2015    Procedure: LUMBAR EPIDURAL;   Surgeon: Carlos Snowden MD;  [facility] [facility]    Tonsillectomy     [3]   Medications Prior to Admission   Medication Sig Dispense Refill Last Dose/Taking    aspirin 81 MG Oral Chew Tab Chew 1 tablet (81 mg total) by mouth daily.   6/1/2025 at  9:00 AM    ferrous sulfate 325 (65 FE) MG Oral Tab EC Take 1 tablet (325 mg total) by mouth 2 (two) times daily with meals. 60 tablet 0 6/1/2025 at  9:00 AM    acetaminophen 325 MG Oral Tab Take 1 tablet (325 mg total) by mouth daily as needed for Pain.   6/1/2025 at  9:00 AM    TRAMADOL 50 MG Oral Tab TAKE ONE TABLET BY MOUTH TWICE DAILY 60 tablet 0 6/1/2025 at  9:00 AM    furosemide 20 MG Oral Tab Take 3 tablets (60 mg total) by mouth in the morning. 135 tablet 0 6/1/2025 at  9:00 AM    TIZANIDINE HCL 4 MG Oral Cap TAKE ONE CAPSULE BY MOUTH THREE TIMES DAILY AS NEEDED 90 capsule 5 6/1/2025 at  9:00 AM    Multiple Vitamin (ONE-DAILY MULTI VITAMINS) Oral Tab Take 1 tablet by mouth in the morning.   6/1/2025 at  9:00 AM   [4]   Current Facility-Administered Medications Ordered in Epic   Medication Dose Route Frequency Provider Last Rate Last Admin    [Transfer Hold] metoprolol succinate ER (Toprol XL) 24 hr tab 25 mg  25 mg Oral Daily Beta Blocker Arturo Guzman MD   25 mg at 06/02/25 0947    vancomycin (Vancocin) 1,000 mg in sodium chloride 0.9% 250 mL IVPB-ADDV  1,000 mg Intravenous Once Randall Boateng  mL/hr at 06/02/25 1332 1,000 mg at 06/02/25 1332    ceFAZolin (Ancef) 2g in 10mL IV syringe premix  2 g Intravenous Once Randall Boateng DO        clonidine-EPINEPHrine-ropivacaine-ketorolac (CERTS) (Duraclon-Adrenalin-Naropin-Toradol) pain cocktail irrigation   Intra-articular Once Randall Boateng DO        [Transfer Hold] lidocaine-menthol 4-1 % patch 1 patch  1 patch Transdermal Daily Rina Markham MD   1 patch at 06/02/25 0935    [Transfer Hold] tiZANidine (Zanaflex) tab 4 mg  4 mg Oral TID PRN Long, Evan, DO         [Transfer Hold] acetaminophen (Tylenol) tab 650 mg  650 mg Oral Q4H PRN Long, Evan, DO        Or    [Transfer Hold] HYDROcodone-acetaminophen (Norco) 5-325 MG per tab 1 tablet  1 tablet Oral Q4H PRN Long, Evan, DO   1 tablet at 06/02/25 0603    Or    [Transfer Hold] HYDROcodone-acetaminophen (Norco) 5-325 MG per tab 2 tablet  2 tablet Oral Q4H PRN Long, Evan, DO        [Transfer Hold] melatonin tab 3 mg  3 mg Oral Nightly PRN Long, Evan, DO        [Transfer Hold] ondansetron (Zofran) 4 MG/2ML injection 4 mg  4 mg Intravenous Q6H PRN Long, Evan, DO        [Transfer Hold] metoclopramide (Reglan) 5 mg/mL injection 5 mg  5 mg Intravenous Q8H PRN Long, Evan, DO        [Transfer Hold] polyethylene glycol (PEG 3350) (Miralax) 17 g oral packet 17 g  17 g Oral Daily PRN Long, Evan, DO        [Transfer Hold] sennosides (Senokot) tab 17.2 mg  17.2 mg Oral Nightly PRN Long, Evan, DO        [Transfer Hold] bisacodyl (Dulcolax) 10 MG rectal suppository 10 mg  10 mg Rectal Daily PRN Long, Evan, DO        [Transfer Hold] fleet enema (Fleet) rectal enema 133 mL  1 enema Rectal Once PRN Long, Evan, DO        [Transfer Hold] morphINE PF 2 MG/ML injection 1 mg  1 mg Intravenous Q2H PRN Long, Evan, DO        Or    [Transfer Hold] morphINE PF 2 MG/ML injection 2 mg  2 mg Intravenous Q2H PRN Long, Evan, DO   2 mg at 06/02/25 0935    Or    [Transfer Hold] morphINE PF 4 MG/ML injection 4 mg  4 mg Intravenous Q2H PRN Long, Evan, DO        sodium chloride 0.9% infusion   Intravenous Continuous Long, Evan, DO 30 mL/hr at 06/01/25 1852 New Bag at 06/01/25 1852    [Transfer Hold] atorvastatin (Lipitor) tab 10 mg  10 mg Oral Daily Long, Evan, DO   10 mg at 06/02/25 0935    [Transfer Hold] hydrALAzine (Apresoline) 20 mg/mL injection 10 mg  10 mg Intravenous Q6H PRN Darien Hernandez MD         No current Epic-ordered outpatient medications on file.   [5]   Allergies  Allergen Reactions    Keflex  DIARRHEA    Bumetanide OTHER (SEE COMMENTS)     Constipation    Cefadroxil DIARRHEA     severe    Clindamycin DIARRHEA    Pantoprazole OTHER (SEE COMMENTS)     constipation    Augmentin [Amoxicillin-Pot Clavulanate] DIARRHEA    Prednisone DIARRHEA    Sulfamethoxazole W/Trimethoprim DIARRHEA   [6]   Family History  Problem Relation Age of Onset    Heart Disorder Father         MI    Diabetes Father     Diabetes Maternal Grandmother    [7]   Social History  Socioeconomic History    Marital status:     Number of children: 1   Occupational History    Occupation:  retired    Occupation: dance teacher-semi retired   Tobacco Use    Smoking status: Never    Smokeless tobacco: Never   Vaping Use    Vaping status: Never Used   Substance and Sexual Activity    Alcohol use: No     Alcohol/week: 0.0 standard drinks of alcohol    Drug use: No    Sexual activity: Never     Comment:    Other Topics Concern     Service No    Blood Transfusions No    Caffeine Concern Yes     Comment: 2-3 cup coffee/day    Occupational Exposure No    Hobby Hazards No    Sleep Concern No    Stress Concern No    Weight Concern No    Special Diet Yes    Exercise Yes     Comment: tap dancing champion-senior    Seat Belt Yes    Self-Exams Yes

## 2025-06-02 NOTE — PLAN OF CARE
A&ox4. RA. Lovenox for dvt prophylaxis. Straight cathed this am for urinary retention. Surgery today on L hip. Dressing cdi. Neuro/vs q4. Tolerating general diet. Up to chair for dinner 1 assist and walker. Daughter at bedside.     Problem: Patient Centered Care  Goal: Patient preferences are identified and integrated in the patient's plan of care  Description: Interventions:  - What would you like us to know as we care for you? I studied Bengali in college  - Provide timely, complete, and accurate information to patient/family  - Incorporate patient and family knowledge, values, beliefs, and cultural backgrounds into the planning and delivery of care  - Encourage patient/family to participate in care and decision-making at the level they choose  - Honor patient and family perspectives and choices  Outcome: Progressing

## 2025-06-02 NOTE — ANESTHESIA PROCEDURE NOTES
Peripheral IV  Date/Time: 6/2/2025 2:12 PM  Inserted by: Chrissy Kaur MD    Placement  Needle size: 18 G  Laterality: right  Location: antecubital  Site prep: alcohol  Technique: anatomical landmarks  Attempts: 1

## 2025-06-02 NOTE — OPERATIVE REPORT
DOS: 6/2/2025    PRE-OPERATIVE DIAGNOSIS: LEFT Displaced Femoral Neck Fracture Closed      POST-OPERATIVE DIAGNOSIS: Same      PROCEDURE:  LEFT Hip Hemiarthroplasty    SURGEON: Randall Boateng D.O.    ASSISTANT:    Jazmin ROCHA    Assistance was necessary secondary to the need for efficient manipulation of limb, components, hardware and closure to minimize OR and anesthesia time.      ANESTHESIA:  General      EBL: 150 ml      FLUIDS:  See anesthesia record      SPECIMEN:  femoral head      COMPLICATIONS:  none      FINDINGS:  Subcapital femoral neck fracture      IMPLANTS: DePuy  Channing, Femoral Stem, 12/14 taper Cemented, Size  6 Standard    Cementralizer, Stem Centralizer, Size 11 Cemented wasted  Self Centering Bi-Polar Head, 28mm ID,  45mm OD  M-SPEC Metal Femoral Head, Size 28mm, +1.5mm neck 12/14 Taper  MV Bone Cement  2      PRE-OPERATIVE DECISION MAKING:    Patient sustained fall and suffered a displaced  LEFT femoral neck fracture.  Patient’s previous activity level was discussed and  Treatment options were reviewed with patient/family.  Risks/Benefits/Options were reviewed and include but not limited to Death, Infection, Pulmonary Embolus, DVT, Reaction to Anesthesia, Leg Length Discrepancy, Intra-operative Fracture, Implant loosening, Failure of Hardware, Repeat Surgery, Nerve and Blood Vessel Damage.        DESCRIPTION OF PROCEDURE:   The patient was brought to the operating room and laid supine on the Boyd table after general anesthesia was induced.  The patient was secured to the Boyd Table all bony prominences were protected. Next, the  LEFT lower extremity was prepped and draped in the usual sterile fashion. Approximately, a 10 cm incision was made directly overlying the Tensor Fascia Michelle Muscle, approximately 3cm lateral and 2 cm distal to the ASIS. A direct anterior approach to the hip joint was performed.  Sharp dissection through the subcutaneous tissue to the fascia overlying the Tensor  Fascia Michelle muscle which was then incised in line with the fibers.  Manual separation of the fascia from the muscle was performed medially down to the hip capsule lateral to the rectus femoris muscle.  The anterior circumflex vessels were identified and coagulated. The capsule was identified and a Cobra retractor was placed along the superior neck.   The capsulotomy was performed in line of the femoral neck from the Trochanteric ridge to the Acetabulum taking care not to injure the labrum.  The capsule was tagged with #1 Ethibond and was then further released proximally and distally down to the lesser trochanter and proximally along the greater trochanter.  The capsule was opened and retractors were repositioned. The femoral head was then removed using a corkscrew after a new femoral neck osteotomy was made. The femoral head measured  45.   Next, the femur was then prepared for the implant. The [place] hook was placed, the canal finder was used followed by the, box cut osteotome. Then, broaching was initiated with the initial Chili Pepper for lateralization and the size  6 broach determined to be the appropriate size. Next, trials were performed for the femoral head, and a size  1.5 was reduced without any difficulty.  The length and offset of the hip was evaluated with fluoroscopy, using the transischial line, overlay, and patellar heights.  The hip was stable and a size  1.5x45 femoral head was chosen.  Next the real size  6 implant was cemented after the trial hip was dislocated and proximal femur was exposed.  The canal was prepared with cement plug, brush, and cocktail with epinephrine soaked raytecs placed in canal why cement was being mixed.  Once mixed, the raytecs were removed.  The canal was filled with cement and then pressurized. The real implant was placed and held in position until hardened.  After the cement had hardened the real Bi-Polar femoral head was placed on a clean dry trunnion.  The hip was then  reduced.   Once the hip was reduced, excellent range of motion and stability were noted. Next, the joint was thoroughly irrigated with normal saline. Next, the anterior capsule was repaired and the Tensor Fascia was closed with 0-Vicryl. The subcutaneous layer was closed with 0 and  2-0 Vicryl suture in an inverted fashion. 3-0 Monocryl was used to close the skin. Sterile dressings were applied. The patient was awoken from anesthesia and was then transferred over to the bed. The patient was sent to the PACU for further care without difficulty.    Dressing removed POD#7  Follow up appointment needs to be scheduled with Esther Mohr NP  [facility]  Deep venous thrombosis prophylaxis: Lovenox in hospital 30 mg Daily while in hospital, then Aspirin 81mg BID for 5 weeks/PRICE hose/ NELYs  Continue physical therapy  Activity as tolerated  Pain management: Oral meds  Ok to shower no tub soaks  Ice to surgery site 20 minutes every hour PRN  D/C plan: undecided  DC once medically stable and clears PT   Needs home prescriptions   Call if any issues

## 2025-06-02 NOTE — CONSULTS
Ortho Consult    Chief complaint: Left hip pain    HPI: The patient is a 89-year-old female who presented to the emergency room complaining of left hip pain after she fell onto her left side earlier today.  She denies any pain in her left lower extremity or bilateral upper extremities..      Past medical history: I reviewed the patient's past medical history today.    Past surgical history: I reviewed the patient's past surgical history today.    Medications: I reviewed the patient's medication list today.    Allergies:  Keflex Medium DIARRHEA   Bumetanide Not Specified OTHER (SEE COMMENTS) Constipation  Cefadroxil Not Specified DIARRHEA severe  Clindamycin Not Specified DIARRHEA   Pantoprazole Not Specified OTHER (SEE COMMENTS) constipation  Augmentin [amoxicillin-pot Clavulanate] Low DIARRHEA   Prednisone Low DIARRHEA   Sulfamethoxazole W/trimethoprim Low DIARRHEA     Family history: I reviewed the patient's family history today.    Social history: The patient denies the use of tobacco or illicit drugs.    Review of systems: I reviewed the patient's review of systems today which includes left hip pain.  She denies any pain in her right lower extremity and bilateral upper extremities.  She denies any chest pain, shortness of breath, or fever/chills.      Physical Examination  Vitals: Temperature 98.5 °F, pulse 98, respiratory 20, blood pressure 180/69    General: The patient is resting in her bed comfortably and in no apparent distress.    Musculoskeletal: The patient is a thin female who presents in no apparent distress.  Examination of the patient's left lower extremity shows her skin to be intact.  Her compartments are soft in her left lower extremity.  She has no calf tenderness.  She is able dorsiflex and plantarflex.  Her sensation is intact to touch and she has brisk capillary refill in all the toes of her foot.  She has a palpable distal pulse in her foot.  She has no tenderness over her knee, shin, and  foot.  She has tenderness over her hip.      Imaging:  X-rays taken of the left hip shows a displaced femoral neck fracture.  There are no dislocations.  There are signs of hip arthritis.      Assessment/Plan: The patient is a 89-year-old female with a left femoral neck fracture.    1.  I discussed both surgical and nonoperative management options with the patient today with regards to her left femoral neck fracture.  Due to the comorbidities associated with nonoperative management and the displaced nature of her left hip fracture, the patient stated that she is interested in surgical options.  I explained to the patient that she is a candidate for a left hip hemiarthroplasty.  The risk and benefits of surgical procedure were explained to the patient.  The patient understood the risks and benefits associated with the procedure and stated that she would like to proceed with surgery for her left femoral neck fracture.  The patient will require medical and cardiac clearance.  The patient will be n.p.o. after midnight tonight for surgery tomorrow pending medical and cardiac clearance.  The patient will be nonweightbearing for her left lower extremity. The surgery will be performed by Dr. Randall Boateng.    2.  The patient understood the discussion today and agreed with the plan.  All of her questions were answered.      Jerman Ford MD

## 2025-06-02 NOTE — CONSULTS
[facility] CARDIOLOGY CONSULT      Joana Antony is a 89 year old female who I assessed as follows:  Chief Complaint   Patient presents with    Leg or Foot Injury    Arrythmia/Palpitations    Fall          REASON FOR CONSULTATION:    pre-op eval            ASSESSMENT/PLAN:     IMPRESSIONS:  Fall leading to left femoral neck fracture  CAD s/p PCI left main 7/13/23  Aortic stenosis s/p TAVR 20mm bioprosthetic valve (Lombardi Jose Antonio 3 Ultra valve). Moderate regurgitation on last echo 2/25  HL  History DVT  HTN  Chronic HFpEF; appears compensated          PLAN:  ECG reviewed  Moderate risk for cardiac complications sg-operatively for upcoming hip surgery.  Will give low dose beta blocker sg-operatively  Diuretic (last was torsemide 40 daily) held, watch for CHF  Follow BP post-op. Elevated pre-op.   Not sure why not on statin. Will discuss with her post-op            --------------------------------------------------------------------------------------------------------------------------------    HPI:       History of CAD s/p PCI left main 7/23, TAVR 7/23 presents with fall and broke left hip. Complains left hip pain. No syncope, chest pain, dyspnea, palpitations.     ECG with some ST depression. Troponin normal. No ischemic symptoms.             Current Medications[1]   Past Medical History[2]  Past Surgical History[3]  Family History[4]   Social History:  Short Social Hx on File[5]       Medications:  Current Hospital Medications[6]        Allergies  Allergies[7]            REVIEW OF SYSTEMS:   GENERAL HEALTH: no fevers  SKIN: denies any unusual skin lesions or rashes   EARS: no recent changes in hearing  EYE: no recent vision changes  THROAT: denies sore throat  RESPIRATORY: denies cough or shortness of breath with exertion  CARDIOVASCULAR: denies chest pain, syncope, or palpitations  GI: denies abdominal pain, nausea and vomiting  NEURO: denies headaches and focal neurologic symptoms  PSYCH: no  recent depression  ENDOCRINE: no change in sleep pattern  HEME: no easy bruising  MS; left hip pain  All other systems reviewed and negative.          EXAM:         TELE:          BP (!) 174/80 (BP Location: Right arm)   Pulse 101   Temp 99.5 °F (37.5 °C) (Oral)   Resp 18   Ht 5' (1.524 m)   Wt 97 lb 9.6 oz (44.3 kg)   SpO2 95%   Breastfeeding No   BMI 19.06 kg/m²   Temp (24hrs), Av.8 °F (37.1 °C), Min:98.3 °F (36.8 °C), Max:99.5 °F (37.5 °C)    Wt Readings from Last 3 Encounters:   25 97 lb 9.6 oz (44.3 kg)   23 99 lb (44.9 kg)   23 99 lb (44.9 kg)         Intake/Output Summary (Last 24 hours) at 2025 0932  Last data filed at 2025 0554  Gross per 24 hour   Intake --   Output 350 ml   Net -350 ml         GENERAL: well developed, well nourished, in no apparent distress  SKIN: no rashes  HEENT: atraumatic, normocephalic, throat without erythema  NECK: supple, no bruits  LUNGS: clear to auscultation  CARDIO: RRR without murmur or S3   GI: soft, nontender  EXTREMITIES: no cyanosis, clubbing or edema  NEUROLOGY: alert  PSYCH: cooperative          LABORATORY DATA:               ECG:        ECHO :  1. Left ventricle: The cavity size is normal. Wall thickness is mildly      increased. Systolic function is hyperdynamic by visual assessment. The      estimated ejection fraction is 70-75%. Wall motion is normal; there are      no regional wall motion abnormalities. There is diastolic dysfunction.   2. Aortic valve: A 20mm bioprosthetic valve (Lombardi Jose Antonio 3 Ultra valve)      is present. There is moderate regurgitation that appears to be      paravalvular. The peak systolic velocity is 2.7m/sec. The mean systolic      gradient is 17mm Hg. The valve area is 1.2cm^2. The valve area index is      0.86cm^2/m^2.   3. Mitral valve: The annulus is mildly calcified.   4. Right ventricle: The cavity size is normal. Wall thickness is normal.      Systolic function is normal by visual  assessment. Estimation of the right      ventricular systolic pressure is mildly increased. The estimated peak      pressure is 34mm Hg. The RV pressure during systole is 34mm Hg.   5. Right atrium: The atrium is dilated.   6. Tricuspid valve: There is moderate-severe regurgitation.   7. Pericardium, extracardiac: There is no significant pericardial effusion.   Impressions:     - Prior study date: 07/12/2024.   - There has been progression of the aortic regurgitation.         Labs:  Recent Labs   Lab 06/01/25  1618 06/02/25  0632   * 149*   BUN 20 16   CREATSERUM 0.74 0.59   CA 9.5 9.5    140   K 3.3* 4.3  4.3   CL 99 104   CO2 31.0 30.0     No results for input(s): \"TROP\" in the last 168 hours.  Recent Labs   Lab 06/01/25  1618 06/02/25  0632   RBC 3.38* 3.39*   HGB 9.6* 9.4*   HCT 31.9* 32.0*   MCV 94.4 94.4   MCH 28.4 27.7   MCHC 30.1* 29.4*   RDW 12.8 13.2   NEPRELIM 17.41*  --    WBC 19.5* 12.5*   .0 338.0     Recent Labs   Lab 06/01/25  1618   TROPHS 8       Imaging:  XR LUMBAR SPINE (MIN 2 VIEWS) (CPT=72100)  Result Date: 6/1/2025  CONCLUSION:  1. No radiographically apparent acute lumbar spine compression fracture. 2. Multilevel lumbar spine degenerative changes with levoscoliosis of the lumbar spine and degenerative anterolisthesis of L5 relative to S1.   Dictated by (CST): Cesario Huynh MD on 6/01/2025 at 5:09 PM     Finalized by (CST): Cesario Hyunh MD on 6/01/2025 at 5:11 PM          XR HIP W OR WO PELVIS 2 OR 3 VIEWS, LEFT (CPT=73502)  Result Date: 6/1/2025   Acute displaced and mildly impacted left femoral neck fracture.  Mild bilateral hip osteoarthritis.  Demineralization.  Atherosclerosis.  Osteitis pubis.  Partially imaged lower lumbar spine degeneration.    Dictated by (CST): Cesario Huynh MD on 6/01/2025 at 5:08 PM     Finalized by (CST): Cesario Huynh MD on 6/01/2025 at 5:09 PM          XR KNEE (1 OR 2 VIEWS), LEFT (CPT=73560)  Result Date: 6/1/2025  CONCLUSION:  1.  No radiographically visible acute osseous injury of the left knee. 2. Mild-to-moderate tricompartmental osteoarthritis.  There is also chondrocalcinosis, which can be seen in the setting of coexistent crystal deposition arthropathy.   Dictated by (CST): Cesario Huynh MD on 6/01/2025 at 5:07 PM     Finalized by (CST): Cesario Huynh MD on 6/01/2025 at 5:08 PM                 Arturo Guzman MD          [1]   No current outpatient medications on file.   [2]   Past Medical History:   Claustrophobia    Deep vein thrombosis (HCC)    Right leg    Hyperglycemia    Hyperlipidemia    MENOPAUSE    OSTEOARTHRITIS    back    Urge incontinence   [3]   Past Surgical History:  Procedure Laterality Date    Back surgery  4/8/2015    L4-5 microdiskectomy    Cataract  10/1/14    right    Fluor gid & loclzj ndl/cath spi dx/ther njx N/A 1/6/2015    Procedure: LUMBAR EPIDURAL;  Surgeon: Carlos Snowden MD;  Location: [facility]    Fluor gid & loclzj ndl/cath spi dx/ther njx N/A 1/19/2015    Procedure: LUMBAR EPIDURAL;  Surgeon: Carlos Snowden MD;  Location: [facility]    Fluor gid & loclzj ndl/cath spi dx/ther njx N/A 1/28/2015    Procedure: LUMBAR EPIDURAL;  Surgeon: Carlos Snowden MD;  Location: [facility]    Injection, w/wo contrast, dx/therapeutic substance, epidural/subarachnoid; lumbar/sacral N/A 1/6/2015    Procedure: LUMBAR EPIDURAL;  Surgeon: Carlos Snowden MD;  Location: [facility]    Injection, w/wo contrast, dx/therapeutic substance, epidural/subarachnoid; lumbar/sacral N/A 1/19/2015    Procedure: LUMBAR EPIDURAL;  Surgeon: Carlos Snowden MD;  Location: [facility]    Injection, w/wo contrast, dx/therapeutic substance, epidural/subarachnoid; lumbar/sacral N/A 1/28/2015    Procedure: LUMBAR EPIDURAL;  Surgeon: Carlos Snowden MD;  Location: [facility]    Patient documented not to have experienced any  of the following events N/A 1/6/2015    Procedure: LUMBAR EPIDURAL;  Surgeon: Carlos Snowden MD;  Location: [facility]    Patient documented not to have experienced any of the following events N/A 1/19/2015    Procedure: LUMBAR EPIDURAL;  Surgeon: Carlos Snowden MD;  Location: [facility]    Patient documented not to have experienced any of the following events N/A 1/28/2015    Procedure: LUMBAR EPIDURAL;  Surgeon: Carlos Snowden MD;  Location: [facility]    Patient withough preoperative order for iv antibiotic surgical site infection prophylaxis. N/A 1/6/2015    Procedure: LUMBAR EPIDURAL;  Surgeon: Carlos Snowden MD;  Location: [facility]    Patient withough preoperative order for iv antibiotic surgical site infection prophylaxis. N/A 1/19/2015    Procedure: LUMBAR EPIDURAL;  Surgeon: Carlos Snowden MD;  Location: [facility]    Patient withough preoperative order for iv antibiotic surgical site infection prophylaxis. N/A 1/28/2015    Procedure: LUMBAR EPIDURAL;  Surgeon: Carlos Snowden MD;  Location: [facility]    Tonsillectomy     [4]   Family History  Problem Relation Age of Onset    Heart Disorder Father         MI    Diabetes Father     Diabetes Maternal Grandmother    [5]   Social History  Socioeconomic History    Marital status:     Number of children: 1   Occupational History    Occupation:  retired    Occupation: dance teacher-semi retired   Tobacco Use    Smoking status: Never    Smokeless tobacco: Never   Vaping Use    Vaping status: Never Used   Substance and Sexual Activity    Alcohol use: No     Alcohol/week: 0.0 standard drinks of alcohol    Drug use: No    Sexual activity: Never     Comment:    Other Topics Concern     Service No    Blood Transfusions No    Caffeine Concern Yes     Comment: 2-3 cup coffee/day    Occupational Exposure No     Hobby Hazards No    Sleep Concern No    Stress Concern No    Weight Concern No    Special Diet Yes    Exercise Yes     Comment: tap dancing champion-senior    Seat Belt Yes    Self-Exams Yes   Social History Narrative    The patient does not use an assistive device..      The patient does live in a home with stairs.  (Basement)     Social Drivers of Health     Food Insecurity: No Food Insecurity (6/1/2025)    NCSS - Food Insecurity     Worried About Running Out of Food in the Last Year: No     Ran Out of Food in the Last Year: No   Transportation Needs: No Transportation Needs (6/1/2025)    NCSS - Transportation     Lack of Transportation: No   Housing Stability: Not At Risk (6/1/2025)    NCSS - Housing/Utilities     Has Housing: Yes     Worried About Losing Housing: No     Unable to Get Utilities: No   [6]   Current Facility-Administered Medications   Medication Dose Route Frequency    lidocaine-menthol 4-1 % patch 1 patch  1 patch Transdermal Daily    tiZANidine (Zanaflex) tab 4 mg  4 mg Oral TID PRN    acetaminophen (Tylenol) tab 650 mg  650 mg Oral Q4H PRN    Or    HYDROcodone-acetaminophen (Norco) 5-325 MG per tab 1 tablet  1 tablet Oral Q4H PRN    Or    HYDROcodone-acetaminophen (Norco) 5-325 MG per tab 2 tablet  2 tablet Oral Q4H PRN    melatonin tab 3 mg  3 mg Oral Nightly PRN    ondansetron (Zofran) 4 MG/2ML injection 4 mg  4 mg Intravenous Q6H PRN    metoclopramide (Reglan) 5 mg/mL injection 5 mg  5 mg Intravenous Q8H PRN    polyethylene glycol (PEG 3350) (Miralax) 17 g oral packet 17 g  17 g Oral Daily PRN    sennosides (Senokot) tab 17.2 mg  17.2 mg Oral Nightly PRN    bisacodyl (Dulcolax) 10 MG rectal suppository 10 mg  10 mg Rectal Daily PRN    fleet enema (Fleet) rectal enema 133 mL  1 enema Rectal Once PRN    morphINE PF 2 MG/ML injection 1 mg  1 mg Intravenous Q2H PRN    Or    morphINE PF 2 MG/ML injection 2 mg  2 mg Intravenous Q2H PRN    Or    morphINE PF 4 MG/ML injection 4 mg  4 mg Intravenous  Q2H PRN    sodium chloride 0.9% infusion   Intravenous Continuous    atorvastatin (Lipitor) tab 10 mg  10 mg Oral Daily    hydrALAzine (Apresoline) 20 mg/mL injection 10 mg  10 mg Intravenous Q6H PRN   [7]   Allergies  Allergen Reactions    Keflex DIARRHEA    Bumetanide OTHER (SEE COMMENTS)     Constipation    Cefadroxil DIARRHEA     severe    Clindamycin DIARRHEA    Pantoprazole OTHER (SEE COMMENTS)     constipation    Augmentin [Amoxicillin-Pot Clavulanate] DIARRHEA    Prednisone DIARRHEA    Sulfamethoxazole W/Trimethoprim DIARRHEA

## 2025-06-02 NOTE — ANESTHESIA POSTPROCEDURE EVALUATION
Patient: Joana Antony    Procedure Summary       Date: 06/02/25 Room / Location: [facility] MAIN OR 09 / EM MAIN OR    Anesthesia Start: 1353 Anesthesia Stop: 1547    Procedure: LEFT HIP HEMIARTHROPLASTY (Left: Hip) Diagnosis:       Fracture of femoral head (HCC)      (Fracture of femoral head (HCC) [S72.059A])    Surgeons: Randall Boateng DO Anesthesiologist: Joni Funes MD    Anesthesia Type: general ASA Status: 4            Anesthesia Type: No value filed.    Vitals Value Taken Time   /70 06/02/25 15:46   Temp 97.4 06/02/25 15:49   Pulse 80 06/02/25 15:48   Resp 22 06/02/25 15:48   SpO2 94 % 06/02/25 15:47   Vitals shown include unfiled device data.    [facility] AN Post Evaluation:   Patient Evaluated in PACU  Patient Participation: complete - patient participated  Level of Consciousness: awake  Pain Score: 0  Pain Management: adequate  Airway Patency:patent  Dental exam unchanged from preop  Yes    Nausea/Vomiting: none  Cardiovascular Status: acceptable  Respiratory Status: acceptable  Postoperative Hydration acceptable      Saba Herron CRNA  6/2/2025 3:49 PM

## 2025-06-02 NOTE — PROGRESS NOTES
[facility] Hospitalist Progress Note     CC: Hospital Follow up    PCP: Marychuy Finnegan MD       Assessment/Plan:     Principal Problem:    Closed fracture of neck of left femur, initial encounter ([facility])  Active Problems:    Palpitations        Patient is a 89 year old female with PMH DVT, hyperlipidemia, GIST, iron deficiency anemia, history of coronary artery disease with PCI in the past aortic stenosis status post TAVR who presents with mechanical fall and left hip fracture.     Mechanical fall with resulting acute displaced mildly impacted left femoral neck fracture  -Ortho planning left hip hemiarthroplasty today  -Cardiac risk assessment completed by cardiology  - Perioperative beta-blocker ordered  Diuretics still on hold as p.o. intake is poor     2.  Coronary artery disease history of PCI in the past aortic stenosis status post TAVR  - Hold home aspirin for OR, can resume after when safe per ortho   Hold home Lasix as n.p.o. continue gentle fluids at 30 an hour until OR then can stop  Continue home statin     3.  Chronic pain muscle aches okay to continue tizanidine     GERD no longer taking PPI     Left knee pain exam appears reassuring  Outpatient follow-up recommended on the calcinosis     FN:  - IVF: Saline at 30 an hour while NPO, stop after OR   - Diet: N.p.o. pending or today     DVT Prophy: Hold  anticipation for surgery  Lines: Peripheral IV        DNR select confirmed with patient and daughter at bedside on admission     Discussed case with ER provider     Dispo: pending clinical course, suspect would need JEAN    Questions/concerns were discussed with patient and/or family by bedside.    Thank You,  Evan Alejandro,     Hospitalist with [facility]     Subjective:     Patient overall feels well anxious to get surgery asking what time states she does not have pain as long as she sits still no fevers or chills    OBJECTIVE:    Blood pressure (!) 165/68, pulse 92, temperature 99.5  °F (37.5 °C), temperature source Oral, resp. rate 18, height 5' (1.524 m), weight 97 lb 9.6 oz (44.3 kg), SpO2 99%, not currently breastfeeding.    Temp:  [98.3 °F (36.8 °C)-99.5 °F (37.5 °C)] 99.5 °F (37.5 °C)  Pulse:  [] 92  Resp:  [18-26] 18  BP: (157-188)/(61-90) 165/68  SpO2:  [95 %-100 %] 99 %      Intake/Output:    Intake/Output Summary (Last 24 hours) at 6/2/2025 1232  Last data filed at 6/2/2025 1056  Gross per 24 hour   Intake --   Output 1050 ml   Net -1050 ml       Last 3 Weights   06/01/25 1839 97 lb 9.6 oz (44.3 kg)   06/01/25 1608 94 lb (42.6 kg)   04/21/23 0939 99 lb (44.9 kg)   03/01/23 1241 99 lb (44.9 kg)   02/25/23 2016 99 lb 4.8 oz (45 kg)   02/25/23 1715 97 lb 10.6 oz (44.3 kg)   02/25/23 1601 95 lb (43.1 kg)       Exam   Gen: No acute distress, elderly frail-appearing  Pulm: Lungs clear, normal respiratory effort  CV: Heart with regular rate and rhythm, systolic murmur present  Abd: Abdomen soft, nontender, nondistended      Data Review:       Labs:     Recent Labs   Lab 06/01/25  1618 06/02/25  0632   RBC 3.38* 3.39*   HGB 9.6* 9.4*   HCT 31.9* 32.0*   MCV 94.4 94.4   MCH 28.4 27.7   MCHC 30.1* 29.4*   RDW 12.8 13.2   NEPRELIM 17.41*  --    WBC 19.5* 12.5*   .0 338.0         Recent Labs   Lab 06/01/25  1618 06/02/25  0632   * 149*   BUN 20 16   CREATSERUM 0.74 0.59   EGFRCR 75 83   CA 9.5 9.5    140   K 3.3* 4.3  4.3   CL 99 104   CO2 31.0 30.0       No results for input(s): \"ALT\", \"AST\", \"ALB\", \"AMYLASE\", \"LIPASE\", \"LDH\" in the last 168 hours.    Invalid input(s): \"ALPHOS\", \"TBIL\", \"DBIL\", \"TPROT\"      Imaging:  XR LUMBAR SPINE (MIN 2 VIEWS) (CPT=72100)  Result Date: 6/1/2025  CONCLUSION:  1. No radiographically apparent acute lumbar spine compression fracture. 2. Multilevel lumbar spine degenerative changes with levoscoliosis of the lumbar spine and degenerative anterolisthesis of L5 relative to S1.   Dictated by (CST): Cesario Huynh MD on 6/01/2025 at 5:09 PM      Finalized by (CST): Cesario Huynh MD on 6/01/2025 at 5:11 PM          XR HIP W OR WO PELVIS 2 OR 3 VIEWS, LEFT (CPT=73502)  Result Date: 6/1/2025   Acute displaced and mildly impacted left femoral neck fracture.  Mild bilateral hip osteoarthritis.  Demineralization.  Atherosclerosis.  Osteitis pubis.  Partially imaged lower lumbar spine degeneration.    Dictated by (CST): Cesario Huynh MD on 6/01/2025 at 5:08 PM     Finalized by (CST): Cesario Huynh MD on 6/01/2025 at 5:09 PM          XR KNEE (1 OR 2 VIEWS), LEFT (CPT=73560)  Result Date: 6/1/2025  CONCLUSION:  1. No radiographically visible acute osseous injury of the left knee. 2. Mild-to-moderate tricompartmental osteoarthritis.  There is also chondrocalcinosis, which can be seen in the setting of coexistent crystal deposition arthropathy.   Dictated by (CST): Cesario Huynh MD on 6/01/2025 at 5:07 PM     Finalized by (CST): Cesario Huynh MD on 6/01/2025 at 5:08 PM              Meds:     Scheduled Medications[1]  Medication Infusions[2]  PRN Medications[3]           [1]    metoprolol succinate ER  25 mg Oral Daily Beta Blocker    lidocaine-menthol  1 patch Transdermal Daily    atorvastatin  10 mg Oral Daily   [2]    sodium chloride 30 mL/hr at 06/01/25 1852   [3]   tiZANidine    acetaminophen **OR** HYDROcodone-acetaminophen **OR** HYDROcodone-acetaminophen    melatonin    ondansetron    metoclopramide    polyethylene glycol (PEG 3350)    sennosides    bisacodyl    fleet enema    morphINE **OR** morphINE **OR** morphINE    hydrALAzine

## 2025-06-03 LAB
ANION GAP SERPL CALC-SCNC: 7 MMOL/L (ref 0–18)
BUN BLD-MCNC: 19 MG/DL (ref 9–23)
BUN/CREAT SERPL: 28.8 (ref 10–20)
CALCIUM BLD-MCNC: 9.1 MG/DL (ref 8.7–10.4)
CHLORIDE SERPL-SCNC: 110 MMOL/L (ref 98–112)
CO2 SERPL-SCNC: 24 MMOL/L (ref 21–32)
CREAT BLD-MCNC: 0.66 MG/DL (ref 0.55–1.02)
DEPRECATED RDW RBC AUTO: 45.9 FL (ref 35.1–46.3)
EGFRCR SERPLBLD CKD-EPI 2021: 81 ML/MIN/1.73M2 (ref 60–?)
ERYTHROCYTE [DISTWIDTH] IN BLOOD BY AUTOMATED COUNT: 13 % (ref 11–15)
GLUCOSE BLD-MCNC: 174 MG/DL (ref 70–99)
HCT VFR BLD AUTO: 30.1 % (ref 35–48)
HGB BLD-MCNC: 8.9 G/DL (ref 12–16)
MCH RBC QN AUTO: 28.7 PG (ref 26–34)
MCHC RBC AUTO-ENTMCNC: 29.6 G/DL (ref 31–37)
MCV RBC AUTO: 97.1 FL (ref 80–100)
OSMOLALITY SERPL CALC.SUM OF ELEC: 298 MOSM/KG (ref 275–295)
PLATELET # BLD AUTO: 311 10(3)UL (ref 150–450)
POTASSIUM SERPL-SCNC: 4.3 MMOL/L (ref 3.5–5.1)
RBC # BLD AUTO: 3.1 X10(6)UL (ref 3.8–5.3)
SODIUM SERPL-SCNC: 141 MMOL/L (ref 136–145)
WBC # BLD AUTO: 14.1 X10(3) UL (ref 4–11)

## 2025-06-03 PROCEDURE — 85014 HEMATOCRIT: CPT | Performed by: INTERNAL MEDICINE

## 2025-06-03 PROCEDURE — 97165 OT EVAL LOW COMPLEX 30 MIN: CPT

## 2025-06-03 PROCEDURE — 97530 THERAPEUTIC ACTIVITIES: CPT

## 2025-06-03 PROCEDURE — 80048 BASIC METABOLIC PNL TOTAL CA: CPT | Performed by: INTERNAL MEDICINE

## 2025-06-03 PROCEDURE — 85027 COMPLETE CBC AUTOMATED: CPT | Performed by: INTERNAL MEDICINE

## 2025-06-03 PROCEDURE — 97116 GAIT TRAINING THERAPY: CPT

## 2025-06-03 PROCEDURE — 97535 SELF CARE MNGMENT TRAINING: CPT

## 2025-06-03 PROCEDURE — 97162 PT EVAL MOD COMPLEX 30 MIN: CPT

## 2025-06-03 PROCEDURE — 85018 HEMOGLOBIN: CPT | Performed by: INTERNAL MEDICINE

## 2025-06-03 RX ORDER — ATORVASTATIN CALCIUM 10 MG/1
10 TABLET, FILM COATED ORAL DAILY
Qty: 30 TABLET | Refills: 0 | Status: SHIPPED | OUTPATIENT
Start: 2025-06-03

## 2025-06-03 RX ORDER — HYDROCODONE BITARTRATE AND ACETAMINOPHEN 5; 325 MG/1; MG/1
1 TABLET ORAL EVERY 8 HOURS PRN
Qty: 20 TABLET | Refills: 0 | Status: SHIPPED | OUTPATIENT
Start: 2025-06-03 | End: 2025-06-04

## 2025-06-03 NOTE — PROGRESS NOTES
[facility] Hospitalist Progress Note     CC: Hospital Follow up    PCP: Marychuy Finnegan MD       Assessment/Plan:     Principal Problem:    Closed fracture of neck of left femur, initial encounter ([facility])  Active Problems:    Palpitations    Patient is a 89 year old female with PMH DVT, hyperlipidemia, GIST, iron deficiency anemia, history of coronary artery disease with PCI in the past aortic stenosis status post TAVR who presents with mechanical fall and left hip fracture.     Mechanical fall with resulting acute displaced mildly impacted left femoral neck fracture  - Ortho consulted s/p left hip hemiarthroplasty   - Cardiac risk assessment completed by cardiology  - Perioperative beta-blocker ordered  - Diuretics still on hold as p.o. intake is poor     Coronary artery disease history of PCI in the past aortic stenosis status post TAVR  - Hold home aspirin for OR, can resume after when safe per ortho   - Hold home Lasix, gentle fluids complete  - Continue home statin     Chronic pain muscle aches okay to continue tizanidine  - tramadol switched to norco      GERD no longer taking PPI     Left knee pain exam appears reassuring  - Outpatient follow-up recommended on the calcinosis     FN:  - IVF: complete   - Diet: ADAT      DVT Prophy: lovenox   Lines: Peripheral IV        DNR select confirmed with patient and daughter at bedside on admission     Dispo: pending clinical course, suspect [facility]    Questions/concerns were discussed with patient and/or family by bedside.    Thank You,  Ghassan Canales MD     Hospitalist with [facility]     Subjective:     Patient overall feels well.  Up to chair and tolerated ambulation.   Up to chair.     OBJECTIVE:    Blood pressure 128/53, pulse 84, temperature 98 °F (36.7 °C), temperature source Temporal, resp. rate 18, height 5' (1.524 m), weight 97 lb 9.6 oz (44.3 kg), SpO2 99%, not currently breastfeeding.    Temp:  [97.4 °F (36.3 °C)-98.4 °F (36.9 °C)] 98 °F  (36.7 °C)  Pulse:  [74-87] 84  Resp:  [13-24] 18  BP: (114-171)/(41-84) 128/53  SpO2:  [91 %-99 %] 99 %      Intake/Output:    Intake/Output Summary (Last 24 hours) at 6/3/2025 1209  Last data filed at 6/3/2025 0741  Gross per 24 hour   Intake 2340 ml   Output 0 ml   Net 2340 ml       Last 3 Weights   06/01/25 1839 97 lb 9.6 oz (44.3 kg)   06/01/25 1608 94 lb (42.6 kg)   04/21/23 0939 99 lb (44.9 kg)   03/01/23 1241 99 lb (44.9 kg)   02/25/23 2016 99 lb 4.8 oz (45 kg)   02/25/23 1715 97 lb 10.6 oz (44.3 kg)   02/25/23 1601 95 lb (43.1 kg)       Exam   Gen: No acute distress, elderly frail-appearing  Pulm: Lungs clear, normal respiratory effort  CV: Heart with regular rate and rhythm, systolic murmur present  Abd: Abdomen soft, nontender, nondistended  Ext: left hip dressing c/d/i      Data Review:       Labs:     Recent Labs   Lab 06/01/25 1618 06/02/25 0632 06/03/25  0639   RBC 3.38* 3.39* 3.10*   HGB 9.6* 9.4* 8.9*   HCT 31.9* 32.0* 30.1*   MCV 94.4 94.4 97.1   MCH 28.4 27.7 28.7   MCHC 30.1* 29.4* 29.6*   RDW 12.8 13.2 13.0   NEPRELIM 17.41*  --   --    WBC 19.5* 12.5* 14.1*   .0 338.0 311.0         Recent Labs   Lab 06/01/25 1618 06/02/25  0632 06/03/25  0639   * 149* 174*   BUN 20 16 19   CREATSERUM 0.74 0.59 0.66   EGFRCR 75 83 81   CA 9.5 9.5 9.1    140 141   K 3.3* 4.3  4.3 4.3   CL 99 104 110   CO2 31.0 30.0 24.0       No results for input(s): \"ALT\", \"AST\", \"ALB\", \"AMYLASE\", \"LIPASE\", \"LDH\" in the last 168 hours.    Invalid input(s): \"ALPHOS\", \"TBIL\", \"DBIL\", \"TPROT\"      Imaging:  XR HIP W OR WO PELVIS 2 OR 3 VIEWS, LEFT (CPT=73502)  Result Date: 6/2/2025  CONCLUSION: Expected postsurgical changes status post recent left hip arthroplasty.    Dictated by (CST): Cecil Vaughn MD on 6/02/2025 at 4:51 PM     Finalized by (CST): Cecil Vaughn MD on 6/02/2025 at 4:51 PM          XR FLUOROSCOPY C-ARM TIME LESS THAN 1 HOUR (CPT=76000)  Result Date: 6/2/2025  CONCLUSION: Intraoperative  exam. Please see operative report for further details.     Dictated by (CST): Chris Copeland MD on 6/02/2025 at 4:42 PM     Finalized by (CST): Chris Copeland MD on 6/02/2025 at 4:45 PM          XR LUMBAR SPINE (MIN 2 VIEWS) (CPT=72100)  Result Date: 6/1/2025  CONCLUSION:  1. No radiographically apparent acute lumbar spine compression fracture. 2. Multilevel lumbar spine degenerative changes with levoscoliosis of the lumbar spine and degenerative anterolisthesis of L5 relative to S1.   Dictated by (CST): Cesario Huynh MD on 6/01/2025 at 5:09 PM     Finalized by (CST): Cesario Huynh MD on 6/01/2025 at 5:11 PM          XR HIP W OR WO PELVIS 2 OR 3 VIEWS, LEFT (CPT=73502)  Result Date: 6/1/2025   Acute displaced and mildly impacted left femoral neck fracture.  Mild bilateral hip osteoarthritis.  Demineralization.  Atherosclerosis.  Osteitis pubis.  Partially imaged lower lumbar spine degeneration.    Dictated by (CST): Cesario Huynh MD on 6/01/2025 at 5:08 PM     Finalized by (CST): Cesario Huynh MD on 6/01/2025 at 5:09 PM          XR KNEE (1 OR 2 VIEWS), LEFT (CPT=73560)  Result Date: 6/1/2025  CONCLUSION:  1. No radiographically visible acute osseous injury of the left knee. 2. Mild-to-moderate tricompartmental osteoarthritis.  There is also chondrocalcinosis, which can be seen in the setting of coexistent crystal deposition arthropathy.   Dictated by (CST): Cesario Huynh MD on 6/01/2025 at 5:07 PM     Finalized by (CST): Cesario Huynh MD on 6/01/2025 at 5:08 PM              Meds:     Scheduled Medications[1]  Medication Infusions[2]  PRN Medications[3]           [1]    metoprolol succinate ER  25 mg Oral Daily Beta Blocker    ceFAZolin  2 g Intravenous Once    sennosides  17.2 mg Oral Nightly    docusate sodium  100 mg Oral BID    enoxaparin  30 mg Subcutaneous Daily    acetaminophen  1,000 mg Oral Q8H SUSAN    ketorolac  15 mg Intravenous Q6H    lidocaine-menthol  1 patch Transdermal Daily    atorvastatin  10  mg Oral Daily   [2]    sodium chloride 30 mL/hr at 06/02/25 1730   [3]   sodium chloride    polyethylene glycol (PEG 3350)    magnesium hydroxide    bisacodyl    fleet enema    ondansetron    metoclopramide    diphenhydrAMINE **OR** diphenhydrAMINE    oxyCODONE **OR** oxyCODONE    HYDROmorphone **OR** HYDROmorphone    cyclobenzaprine    tiZANidine    acetaminophen **OR** HYDROcodone-acetaminophen **OR** HYDROcodone-acetaminophen    melatonin    ondansetron    metoclopramide    polyethylene glycol (PEG 3350)    sennosides    bisacodyl    fleet enema    morphINE **OR** morphINE **OR** morphINE

## 2025-06-03 NOTE — PHYSICAL THERAPY NOTE
PHYSICAL THERAPY HIP EVALUATION - INPATIENT     Room Number: 432/432-A  Evaluation Date: 6/3/2025  Type of Evaluation: Initial  Physician Order: PT Eval and Treat    Presenting Problem: S/P L hip hemiarthroplasty  Co-Morbidities : DVT, hyperlipidemia, GIST, iron deficiency anemia, history of coronary artery disease with PCI in the past aortic stenosis status post TAVR  Reason for Therapy: Mobility Dysfunction and Discharge Planning    PHYSICAL THERAPY ASSESSMENT   Patient is a 89 year old female admitted 6/1/2025 for S/P L hip hemiarthroplasty.  Prior to admission, patient's baseline is mod I using RW.  Patient is currently functioning below baseline with bed mobility, transfers, gait, standing prolonged periods, and performing household tasks.  Patient is requiring minimal assist and moderate assist as a result of the following impairments: decreased functional strength, decreased endurance/aerobic capacity, pain, impaired standing balance, decreased muscular endurance, and medical status.  Physical Therapy will continue to follow for duration of hospitalization.    Patient will benefit from continued skilled PT Services to promote return to prior level of function and safety with continuous assistance and gradual rehabilitative therapy . Pt and her daughter who was present towards the end of this evaluation verbalized their concern as to patient going home alone and requested for [facility] placement given her hx of multiple falls.     PLAN DURING HOSPITALIZATION  Nursing Mobility Recommendation : 1 Assist  PT Device Recommendation: Rolling walker  PT Treatment Plan: Bed mobility, Patient education, Gait training, Transfer training  Rehab Potential : Fair  Frequency (Obs): Daily     PHYSICAL THERAPY MEDICAL/SOCIAL HISTORY   History related to current admission:  S/P L hip hemiarthroplasty     Problem List  Principal Problem:    Closed fracture of neck of left femur, initial encounter ([facility])  Active Problems:     Palpitations      HOME SITUATION  Type of Home: House  Home Layout: One level, Other (Comment) (0 steps)  Lives With: Alone    Drives: Yes   Patient Regularly Uses: Rolling walker     Prior Level of [place]: mod I using RW    SUBJECTIVE  \" I need this purewick on at all times\"     PHYSICAL THERAPY EXAMINATION   OBJECTIVE  Precautions: DAMOIN global precautions  Fall Risk: Standard fall risk    WEIGHT BEARING RESTRICTION  L Lower Extremity: Weight Bearing as Tolerated      PAIN ASSESSMENT  Rating: Unable to rate  Location: L hip/L LE  Management Techniques: Activity promotion, Body mechanics, Breathing techniques, Relaxation, Repositioning    COGNITION  Overall Cognitive Status:  WFL - within functional limits    RANGE OF MOTION AND STRENGTH ASSESSMENT  Upper extremity ROM and strength are within functional limits B UE  Lower extremity ROM is within functional limits except for the following:  L LE  Lower extremity strength is within functional limits except for the following:   L hip- 3-/5    BALANCE  Static Sitting: Fair  Dynamic Sitting: Fair -  Static Standing: Fair -  Dynamic Standing: Poor +      AM-PAC '6-Clicks' INPATIENT SHORT FORM - BASIC MOBILITY  How much difficulty does the patient currently have...  Patient Difficulty: Turning over in bed (including adjusting bedclothes, sheets and blankets)?: A Lot   Patient Difficulty: Sitting down on and standing up from a chair with arms (e.g., wheelchair, bedside commode, etc.): A Little   Patient Difficulty: Moving from lying on back to sitting on the side of the bed?: A Lot   How much help from another person does the patient currently need...   Help from Another: Moving to and from a bed to a chair (including a wheelchair)?: A Little   Help from Another: Need to walk in hospital room?: A Little   Help from Another: Climbing 3-5 steps with a railing?: Total     AM-PAC Score:  Raw Score: 14   Approx Degree of Impairment: 61.29%   Standardized Score (AM-PAC  Scale): 38.1   CMS Modifier (G-Code): CL    FUNCTIONAL ABILITY STATUS  Functional Mobility/Gait Assessment  Gait Assistance: Minimum assistance  Distance (ft): 15  Assistive Device: Rolling walker  Pattern: L Decreased stance time  Rolling: moderate assist  Supine to Sit: moderate assist  Sit to Supine: moderate assist  Sit to Stand: minimal assist    Exercise/Education Provided:  Bed mobility  Gait training  Neuromuscular re-educate  Transfer training    Skilled Therapy Provided: Pt was cleared to participate with PT and OT per RN. Pt was received while seated in the chair and was agreeable to participate with PT. PT educated pt as to global hip precaution with hand outs provided. Pt was able to complete STS and SPT using RW at min A. Bed mob with sit <> supine at mod A . Pt was able to ambulate using RW x 15ft with min A with chair follow for safety. Discussed as to dc rec with pt and pt's daughter verbalized their concern with pt's hx of falls and daughter prefers pt to dc to [facility]. Pt was left in chair, call light within reach, daughter at bedside and handoff to nurse.    The patient's Approx Degree of Impairment: 61.29% has been calculated based on documentation in the [facility] '6 clicks' Inpatient Basic Mobility Short Form.  Research supports that patients with this level of impairment may benefit from continued PT skilled services with rec for [facility]/GR.  Final disposition will be made by interdisciplinary medical team.    Patient End of Session: Up in chair, Needs met, Call light within reach, RN aware of session/findings, All patient questions and concerns addressed, Hospital anti-slip socks, Family present, Ice applied    CURRENT GOALS  Goals to be met by: 6/24/2025  Patient Goal Patient's self-stated goal is: to be walking   Goal #1 Patient is able to demonstrate supine - sit EOB @ level: modified independent   Goal #1   Current Status    Goal #2 Patient is able to demonstrate transfers Sit to/from Stand at  assistance level: modified independent     Goal #2  Current Status    Goal #3 Patient is able to ambulate 300 feet with assistive device at assistance level: modified independent    Goal #3   Current Status    Goal #5 Patient verbalizes and/or demonstrates all precautions and safety concerns independently   Goal #5   Current Status    Goal #6 Patient independently performs home exercise program for ROM/strengthening per the instructions provided in preparation for discharge.   Goal #6  Current Status      Patient Evaluation Complexity Level:  History Moderate - 1 or 2 personal factors and/or co-morbidities   Examination of body systems Moderate - addressing a total of 3 or more elements   Clinical Presentation  Moderate - Evolving   Clinical Decision Making  Moderate Complexity     Gait Trainin minutes  Therapeutic Activity:  14 minutes    Sisi Portillo, PT, DPT

## 2025-06-03 NOTE — PROGRESS NOTES
[facility]  part of [facility]    Cardiology Progress Note    Joana Antony Patient Status:  Inpatient    1930 MRN [id number]   Location [facility] 4W/SW/SE Attending Ghassan Canales MD   Hosp Day # 2 PCP Marychuy Finnegan MD       Impression/Plan:  89 year old female presenting with:    Fall leading to left femoral neck fracture; s/p L hemiarthroplasty 2025  CAD s/p PCI left main 23  Aortic stenosis s/p TAVR 20mm bioprosthetic valve ([place] 3 Ultra valve). Moderate regurgitation on last echo   HL  History DVT  HTN  Chronic HFpEF; appears compensated    - Cont statin, bb; asa and diuretic held on admission, resume prior to discharge  - Post-op care per ortho  - Monitor on tele  - Will follow     Subjective: No events overnight.  Today patient denies chest pain, palpitations, dyspnea.    Problem List[1]    Objective:   Temp: 97.6 °F (36.4 °C)  Pulse: 80  Resp: 17  BP: 142/57    Intake/Output:     Intake/Output Summary (Last 24 hours) at 6/3/2025 0941  Last data filed at 6/3/2025 0741  Gross per 24 hour   Intake 2340 ml   Output 800 ml   Net 1540 ml       Last 3 Weights   25 1839 97 lb 9.6 oz (44.3 kg)   25 1608 94 lb (42.6 kg)   23 0939 99 lb (44.9 kg)   23 1241 99 lb (44.9 kg)   23 99 lb 4.8 oz (45 kg)   23 1715 97 lb 10.6 oz (44.3 kg)   23 1601 95 lb (43.1 kg)           Physical Exam:    General: Alert and oriented x 3. No apparent distress. No respiratory or constitutional distress.  HEENT: Normocephalic, anicteric sclera, neck supple, no thyromegaly or adenopathy.  Neck: No JVD, carotids 2+, no bruits.  Cardiac: Regular rate and rhythm. S1, S2 normal. No murmur, pericardial rub, S3, thrill, heave or extra cardiac sounds.  Lungs: Clear without wheezes, rales, rhonchi or dullness.  Normal excursions and effort.  Abdomen: Soft, non-tender. No organosplenomegally, mass or rebound, BS-present.  Extremities:  Without clubbing, cyanosis or edema.  Peripheral pulses are 2+.  Neurologic: Alert and oriented, normal affect. No motor or coordinational deficit.  Skin: Warm and dry.     Laboratory/Data:    Labs:         Recent Labs   Lab 06/01/25  1618 06/01/25  1810 06/02/25  0632 06/03/25  0639   WBC 19.5*  --  12.5* 14.1*   HGB 9.6*  --  9.4* 8.9*   MCV 94.4  --  94.4 97.1   .0  --  338.0 311.0   INR  --  0.97  --   --        Recent Labs   Lab 06/01/25  1618 06/02/25  0632 06/03/25  0639    140 141   K 3.3* 4.3  4.3 4.3   CL 99 104 110   CO2 31.0 30.0 24.0   BUN 20 16 19   CREATSERUM 0.74 0.59 0.66   CA 9.5 9.5 9.1   * 149* 174*       No results for input(s): \"ALT\", \"AST\", \"ALB\", \"AMYLASE\", \"LIPASE\", \"LDH\" in the last 168 hours.    Invalid input(s): \"ALPHOS\", \"TBIL\", \"DBIL\", \"TPROT\"    No results for input(s): \"TROP\" in the last 168 hours.    Allergies:   Allergies[2]    Medications:  Current Hospital Medications[3]    Bandar Raymundo MD  6/3/2025  9:41 AM         [1]   Patient Active Problem List  Diagnosis    Urge incontinence    Osteoarthrosis, unspecified whether generalized or localized, lower leg    Abnormal CXR    Abnormal CT scan, chest    L5-S1 mod diffuse & right large far lateral, L4-5 mod diffuse, L3-4 right mod, L2-3 mod diffuse bugling discs    L4-5 mod central, L3-4 mod-severe central, L2-3 mod central stenosis    L4-5 right large HNP    Spondylolisthesis at L4-L5 level grade 1-2 unstable    Spondylolisthesis at L3-L4 level grade 1 unstable    Spondylolisthesis at L5-S1 level grade 1-2 slightly unstable    Uterine mass    L4-5 & L5-S1 right sided laminectomy    Bilateral leg edema    Brachial neuritis or radiculitis NOS    Unspecified arthropathy, shoulder region    Pain in joint of left shoulder    L5-S1 left severe/right mod-severe, L4-5 right mod-severe, L3-4 right mod, L2-3 left mod foraminal stenosis    bilateral L5-S1 radiculopathies    Right hand pain    Coccydynia    Sacroiliac joint  pain    Femoral hernia of left side    Claustrophobia    Drusen (degenerative) of macula, bilateral    Aortic stenosis, moderate    Low back pain at multiple sites    Sprain of sacroiliac ligament, subsequent encounter    Protein-calorie malnutrition, unspecified severity ([facility])    Acute deep vein thrombosis (DVT) of femoral vein of right lower extremity (HCC)    Anemia, unspecified type    Closed fracture of neck of left femur, initial encounter ([facility])    Palpitations   [2]   Allergies  Allergen Reactions    Keflex DIARRHEA    Bumetanide OTHER (SEE COMMENTS)     Constipation    Cefadroxil DIARRHEA     severe    Clindamycin DIARRHEA    Pantoprazole OTHER (SEE COMMENTS)     constipation    Augmentin [Amoxicillin-Pot Clavulanate] DIARRHEA    Prednisone DIARRHEA    Sulfamethoxazole W/Trimethoprim DIARRHEA   [3]   Current Facility-Administered Medications   Medication Dose Route Frequency    metoprolol succinate ER (Toprol XL) 24 hr tab 25 mg  25 mg Oral Daily Beta Blocker    ceFAZolin (Ancef) 2g in 10mL IV syringe premix  2 g Intravenous Once    sodium chloride 0.9 % IV bolus 500 mL  500 mL Intravenous Once PRN    sennosides (Senokot) tab 17.2 mg  17.2 mg Oral Nightly    docusate sodium (Colace) cap 100 mg  100 mg Oral BID    polyethylene glycol (PEG 3350) (Miralax) 17 g oral packet 17 g  17 g Oral Daily PRN    magnesium hydroxide (Milk of Magnesia) 400 MG/5ML oral suspension 30 mL  30 mL Oral Daily PRN    bisacodyl (Dulcolax) 10 MG rectal suppository 10 mg  10 mg Rectal Daily PRN    fleet enema (Fleet) rectal enema 133 mL  1 enema Rectal Once PRN    ondansetron (Zofran) 4 MG/2ML injection 4 mg  4 mg Intravenous Q6H PRN    metoclopramide (Reglan) 5 mg/mL injection 5 mg  5 mg Intravenous Q8H PRN    diphenhydrAMINE (Benadryl) cap/tab 25 mg  25 mg Oral Q4H PRN    Or    diphenhydrAMINE (Benadryl) 50 mg/mL  injection 12.5 mg  12.5 mg Intravenous Q4H PRN    sodium chloride 0.9% infusion   Intravenous Continuous    enoxaparin  (Lovenox) 30 MG/0.3ML SUBQ injection 30 mg  30 mg Subcutaneous Daily    acetaminophen (Tylenol Extra Strength) tab 1,000 mg  1,000 mg Oral Q8H SUSAN    ketorolac (Toradol) 15 MG/ML injection 15 mg  15 mg Intravenous Q6H    oxyCODONE immediate release tab 2.5 mg  2.5 mg Oral Q4H PRN    Or    oxyCODONE immediate release tab 5 mg  5 mg Oral Q4H PRN    HYDROmorphone (Dilaudid) 1 MG/ML injection 0.2 mg  0.2 mg Intravenous Q2H PRN    Or    HYDROmorphone (Dilaudid) 1 MG/ML injection 0.4 mg  0.4 mg Intravenous Q2H PRN    cyclobenzaprine (Flexeril) tab 5 mg  5 mg Oral Q8H PRN    lidocaine-menthol 4-1 % patch 1 patch  1 patch Transdermal Daily    tiZANidine (Zanaflex) tab 4 mg  4 mg Oral TID PRN    acetaminophen (Tylenol) tab 650 mg  650 mg Oral Q4H PRN    Or    HYDROcodone-acetaminophen (Norco) 5-325 MG per tab 1 tablet  1 tablet Oral Q4H PRN    Or    HYDROcodone-acetaminophen (Norco) 5-325 MG per tab 2 tablet  2 tablet Oral Q4H PRN    melatonin tab 3 mg  3 mg Oral Nightly PRN    ondansetron (Zofran) 4 MG/2ML injection 4 mg  4 mg Intravenous Q6H PRN    metoclopramide (Reglan) 5 mg/mL injection 5 mg  5 mg Intravenous Q8H PRN    polyethylene glycol (PEG 3350) (Miralax) 17 g oral packet 17 g  17 g Oral Daily PRN    sennosides (Senokot) tab 17.2 mg  17.2 mg Oral Nightly PRN    bisacodyl (Dulcolax) 10 MG rectal suppository 10 mg  10 mg Rectal Daily PRN    fleet enema (Fleet) rectal enema 133 mL  1 enema Rectal Once PRN    morphINE PF 2 MG/ML injection 1 mg  1 mg Intravenous Q2H PRN    Or    morphINE PF 2 MG/ML injection 2 mg  2 mg Intravenous Q2H PRN    Or    morphINE PF 4 MG/ML injection 4 mg  4 mg Intravenous Q2H PRN    atorvastatin (Lipitor) tab 10 mg  10 mg Oral Daily

## 2025-06-03 NOTE — CM/SW NOTE
06/03/25 1100   CM/ Referral Data   Referral Source Physician   Reason for Referral Discharge planning   Informant Patient   Medical Hx   Does patient have an established PCP? Yes  (Marychuy Finnegan MD)   Significant Past Medical/Mental Health Hx DVT, hyperlipidemia, GIST, iron deficiency anemia, history of coronary artery disease   Patient Info   Patient's Current Mental Status at Time of Assessment Alert;Oriented   Patient's Home Environment House   Number of Levels in Home 1   Patient lives with Alone   Patient Status Prior to Admission   Independent with ADLs and Mobility No   Pt. requires assistance with Driving;Ambulating   Services in place prior to admission DME/Supplies at home   Type of DME/Supplies Wheeled Walker;Straight Cane;Grab Bars   Discharge Needs   Anticipated D/C needs To be determined;Subacute rehab   Services Requested   Submitted to [facility] Yes   PASRR Level 1 Submitted Yes     CM met with patient at bedside to discuss discharge planning.  Above assessment completed.  Address on file and PCP verified.    Patient lives alone in a one-level ranch home. Patient's daughter Per lives nearby in [place] and is available if needed. Patient no longer drives but utilizes senior transport services in the area. Patient primarily ambulates with a walker.  Additional DME available at home noted above.    PT/OT evaluations pending.  Patient denies prior history of home healthcare services or rehab stay.  Anticipated need for subacute rehab upon discharge.  CM sent tentative [facility] preferred [facility] referrals via [facility] - pending responses from providers regarding ability to accommodate.  PASRR completed and uploaded to referral. Patient confirms she is agreeable to [facility] upon discharge if indicated.    MDO received for [facility].  CM completed demographic information on POLST form and placed on patient's physical chart.  Physician to complete/sign remainder of form following discussion with patient.    CM left VM  with patient's daughter/WALDEMAR Schroeder at 1028 requesting call back to discuss discharge planning.    1550  CM met with patient and patient's daughter Per at bedside to provide list of accepting [facility] facilities.  Patient/family notified [facility] is unable to accommodate d/t bed availability.  Patient/family request [facility] referral be sent to [facility] as this is likely second choice.    CM sent [facility] referral to [facility] via [facility] - awaiting confirmed acceptance.    CM provided patient's daughter Per with L[facility] resources per request.    / to remain available for support and/or discharge planning.     Plan: [facility] - pending choice facility, 3 IP midnights, medical clearance    Sonja Peterson RN, BSN  Nurse   [phone]

## 2025-06-03 NOTE — PLAN OF CARE
Problem: SAFETY ADULT - FALL  Goal: Free from fall injury  Description: INTERVENTIONS:  - Assess pt frequently for physical needs  - Identify cognitive and physical deficits and behaviors that affect risk of falls.  - [place] fall precautions as indicated by assessment.  - Educate pt/family on patient safety including physical limitations  - Instruct pt to call for assistance with activity based on assessment  - Modify environment to reduce risk of injury  - Provide assistive devices as appropriate  - Consider OT/PT consult to assist with strengthening/mobility  - Encourage toileting schedule  Outcome: Progressing     Post-op day #1. Dressing in place to L hip clean and intact. Patient's BP 75/35 at 17:30, MD notified. IV bolus 500 ml .9% given per MD order. Monitoring blood glucose levels per order. Tolerating diet. Voiding. SCDs, Lovenox, TEDs for DVT prophylaxis. Pain medication provided as ordered. Up with x1 assist and a walker. Fall precautions maintained- bed alarm on, bed locked in lowest position, call light and personal belongings within reach, non-skid socks in place to bilateral feet. Frequent rounding by nursing staff. Plan to go to [facility].

## 2025-06-03 NOTE — PLAN OF CARE
Patient is alert and oriented. RA. SCDs on for DVT prophylaxis. Voiding freely, using purewick. Up x1 with walker. Scheduled tylenol and toradol given for pain management. Dressing in place to left hip, CDI. Call light within reach, bed alarm active.  Problem: Patient Centered Care  Goal: Patient preferences are identified and integrated in the patient's plan of care  Description: Interventions:  - Provide timely, complete, and accurate information to patient/family  - Incorporate patient and family knowledge, values, beliefs, and cultural backgrounds into the planning and delivery of care  - Encourage patient/family to participate in care and decision-making at the level they choose  - Honor patient and family perspectives and choices  Outcome: Progressing       Problem: PAIN - ADULT  Goal: Verbalizes/displays adequate comfort level or patient's stated pain goal  Description: INTERVENTIONS:  - Encourage pt to monitor pain and request assistance  - Assess pain using appropriate pain scale  - Administer analgesics based on type and severity of pain and evaluate response  - Implement non-pharmacological measures as appropriate and evaluate response  - Consider cultural and social influences on pain and pain management  - Manage/alleviate anxiety  - Utilize distraction and/or relaxation techniques  - Monitor for opioid side effects  - Notify MD/LIP if interventions unsuccessful or patient reports new pain  - Anticipate increased pain with activity and pre-medicate as appropriate  Outcome: Progressing     Problem: RISK FOR INFECTION - ADULT  Goal: Absence of fever/infection during anticipated neutropenic period  Description: INTERVENTIONS  - Monitor WBC  - Administer growth factors as ordered  - Implement neutropenic guidelines  Outcome: Progressing     Problem: SAFETY ADULT - FALL  Goal: Free from fall injury  Description: INTERVENTIONS:  - Assess pt frequently for physical needs  - Identify cognitive and physical  deficits and behaviors that affect risk of falls.  - [place] fall precautions as indicated by assessment.  - Educate pt/family on patient safety including physical limitations  - Instruct pt to call for assistance with activity based on assessment  - Modify environment to reduce risk of injury  - Provide assistive devices as appropriate  - Consider OT/PT consult to assist with strengthening/mobility  - Encourage toileting schedule  Outcome: Progressing     Problem: DISCHARGE PLANNING  Goal: Discharge to home or other facility with appropriate resources  Description: INTERVENTIONS:  - Identify barriers to discharge w/pt and caregiver  - Include patient/family/discharge partner in discharge planning  - Arrange for needed discharge resources and transportation as appropriate  - Identify discharge learning needs (meds, wound care, etc)  - Arrange for interpreters to assist at discharge as needed  - Consider post-discharge preferences of patient/family/discharge partner  - Complete POLST form as appropriate  - Assess patient's ability to be responsible for managing their own health  - Refer to Case Management Department for coordinating discharge planning if the patient needs post-hospital services based on physician/LIP order or complex needs related to functional status, cognitive ability or social support system  Outcome: Progressing

## 2025-06-03 NOTE — OCCUPATIONAL THERAPY NOTE
OCCUPATIONAL THERAPY EVALUATION - INPATIENT     Room Number: 432/432-A  Evaluation Date: 6/3/2025  Type of Evaluation: Initial  Presenting Problem: fall, underwent L derrek-hip    Physician Order: IP Consult to Occupational Therapy  Reason for Therapy: ADL/IADL Dysfunction and Discharge Planning    OCCUPATIONAL THERAPY ASSESSMENT   Patient is a 89 year old female admitted 6/1/2025 for derrek-hip after fall.  Prior to admission, patient's baseline is independent w ADLS, TFRS, and functional mobility w 2ww.  Patient is currently functioning below baseline with ADLs and functional mobility/TFRs.  Patient is requiring up to max A LB ADLS, min   A functional TFRS, mod A bed mobility as a result of the following impairments: decreased functional strength, decreased functional reach, decreased endurance, pain, impaired   balance, decreased muscular endurance, difficulty maintaining precautions, limited   ROM, and decreased safety awareness. Occupational Therapy will continue to follow for duration of hospitalization.    Patient will benefit from continued skilled OT Services to promote return to prior level of function and safety with continuous assistance and gradual rehabilitative therapy.    PLAN DURING HOSPITALIZATION  OT Device Recommendations: TBD  OT Treatment Plan: Balance activities, Energy conservation/work simplification techniques, ADL training, Functional transfer training, UE strengthening/ROM, Endurance training     OCCUPATIONAL THERAPY MEDICAL/SOCIAL HISTORY   Problem List   Principal Problem:    Closed fracture of neck of left femur, initial encounter ([facility])  Active Problems:    Palpitations    HOME SITUATION  Type of Home: House  Home Layout: One level; Other (Comment) (0 steps)  Lives With: Alone  Toilet and Equipment: Standard height toilet  Shower/Tub and Equipment: Tub-shower combo; Grab bar  Drives: Yes  Patient Regularly Uses: Rolling walker      SUBJECTIVE  \"I have no help at home I need to go to  rehab\"    OCCUPATIONAL THERAPY EXAMINATION   OBJECTIVE  Precautions: [place] global precautions  Fall Risk: Standard fall risk    WEIGHT BEARING RESTRICTION  L Lower Extremity: Weight Bearing as Tolerated    PAIN ASSESSMENT  Ratin  Location: L groin  Management Techniques: Activity promotion; Body mechanics; Breathing techniques; Relaxation; Repositioning; Ice    ACTIVITY TOLERANCE  Pulse: 84  Heart Rate Source: Monitor     BP: 135/78  BP Location: Right arm  BP Method: Automatic  Patient Position: Sitting         COGNITION  Overall Cognitive Status:  WFL - within functional limits    RANGE OF MOTION   Upper extremity ROM is within functional limits     STRENGTH ASSESSMENT  Upper extremity strength is within functional limits     ACTIVITIES OF DAILY LIVING ASSESSMENT  AM-PAC ‘6-Clicks’ Inpatient Daily Activity Short Form  How much help from another person does the patient currently need…  -   Putting on and taking off regular lower body clothing?: A Lot  -   Bathing (including washing, rinsing, drying)?: A Lot  -   Toileting, which includes using toilet, bedpan or urinal? : A Lot  -   Putting on and taking off regular upper body clothing?: A Little  -   Taking care of personal grooming such as brushing teeth?: A Little  -   Eating meals?: None    AM-PAC Score:  Score: 16  Approx Degree of Impairment: 53.32%  Standardized Score (AM-PAC Scale): 35.96  CMS Modifier (G-Code): CK    BED MOBILITY  Supine to Sit: mod assist  Sit to Supine: mod assist  Comments:    Head of bed elevated, use of rails     FUNCTIONAL TRANSFER ASSESSMENT  Sit to stand Stand from EOB: min assist  Chair Transfer: min assist  Stand Pivot Transfer from EOB to Chair: min assist  Toilet TFR: Min A simulated  Comments:    EDU on proper positioning, hand placement, overall mechanics for TFR. Use of 2ww    FUNCTIONAL MOBILITY  min assist for in-room fx mobility using RW  Comments:     15 ft, limited by dizziness    ACTIVITIES OF DAILY LIVING  Eating:  independent  Grooming: setup assist-seated  UB Dressing: min assist  LB Dressing: max assist  Toileting: max assist  Comments:     Grid completed based on abilities during session and clinical judgement. Pt demos abilities with:   LB dressing, clothing management on/off hips, hand hygiene seated     Skilled Therapy Provided: Educated pt about role of OT and POC. Reviewed posterior hip precautions with pt. Educated pt about adherence to precautions during ADLs and functional transfers, WB status, pain and edema management.  Pt verbalized understanding but benefits from further EDU. At the end, Pt positioned upright in chair with call light and personal items in reach, BLE elevated, ice to incisional area, handout for hip precautions, RN aware.       EDUCATION PROVIDED  Patient Education : Role of Occupational Therapy; Plan of Care; Discharge Recommendations; Functional Transfer Techniques; DME Recommendations; Fall Prevention; Weight Bear Status; Surgical Precautions; Posture/Positioning; Energy Conservation; Proper Body Mechanics; Edema Reduction  Patient's Response to Education: Requires Further Education    The patient's Approx Degree of Impairment: 53.32% has been calculated based on documentation in the [facility] '6 clicks' Inpatient Daily Activity Short Form.  Research supports that patients with this level of impairment may benefit from rehab.  Final disposition will be made by interdisciplinary medical team.    Patient End of Session: Up in chair, Needs met, Call light within reach, All patient questions and concerns addressed, RN aware of session/findings, Ice applied, Alarm set, Family present    OT Goals  Patient self-stated goal is: return home alone     Patient will complete LE dressing with min A, AE use  Comment:     Patient will complete toilet transfer with SBA  Comment:     Patient will stand during aspects of ADL tasks with SBA   Comment:    Patient will independently recall and adhere to posterior hip  precautions w/o cues  Comment:         Goals  on: 25  Frequency: 3-5xs/week    Patient Evaluation Complexity Level:   Occupational Profile/Medical History LOW - Brief history including review of medical or therapy records    Specific performance deficits impacting engagement in ADL/IADL HIGH  5+ performance deficits    Client Assessment/Performance Deficits LOW - No comorbidities nor modifications of tasks    Clinical Decision Making LOW - Analysis of occupational profile, problem-focused assessments, limited treatment options    Overall Complexity LOW     OT Session Time: 23 minutes  Self-Care Home Management: 10 minutes  Therapeutic Activity: 13 minutes

## 2025-06-03 NOTE — DISCHARGE INSTRUCTIONS
Left hip hemiarthroplasty 6/2- instruction per ortho     Dressing removed POD#7  Follow up appointment needs to be scheduled with Esther Mohr NP  WBAT  Deep venous thrombosis prophylaxis: Lovenox in hospital 30 mg Daily while in hospital, then Aspirin 81mg BID for 5 weeks/PRICE hose/ [place]  Continue physical therapy  Activity as tolerated  Pain management: Oral meds  Ok to shower no tub soaks  Ice to surgery site 20 minutes every hour PRN

## 2025-06-03 NOTE — SPIRITUAL CARE NOTE
Spiritual Care Visit Note    Patient Name: Joana Antony Date of Spiritual Care Visit: 25   : 1930 Primary Dx: Closed fracture of neck of left femur, initial encounter (HCC)     Referred By: Referral From: Other (Comment)  Spiritual Care Taxonomy:  Intended Effects: De-escalate emotionally charged situations  Methods: Collaborate with care team member, Setting boundaries  Interventions: Active listening, Ask guided questions, Assist someone with Advance Directives  Visit Type/Summary:   - PoA: Identified Existing PoAH - No Updates Needed: Visited patient in response to POA for Healthcare consult/request. Prior to visit, reviewed the patient's EHR and paper chart to identify any existing PoAH documentation. A previously completed PoAH document was located. The document was reviewed with the patient during the visit. Patient states that the current document is accurate. No changes or updates are required. Confirmed that the PoAH primary agent name and contact information have been entered into the Epic, Advance Directives section. A copy of the existing document was printed and given to the patient. A copy of the document has been placed on the patient's chart.   remains available for follow up.  Spiritual Care support can be requested via an Epic consult. For urgent/immediate needs, please contact the On Call  at: [place]: ext [phone]     Rev Iram Rudolph

## 2025-06-03 NOTE — PROGRESS NOTES
[facility]  Progress Note    Joana Antony Patient Status:  Inpatient    1930 MRN [id number]   Location [facility] 4W/SW/SE Attending Ghassan Canales MD   Hosp Day # 2 PCP Marychuy Finnegan MD     SUBJECTIVE:  Interval History: Patient has no current complaints.  She states her pain is minimal and she is very impressed with the surgery. She is up in the chair eating breakfast. Patient denies chest pain, shortness of breath and calf pain.    Patient feels her family will not want her to go home as she lives alone    Post-Op Day: 1    OBJECTIVE:  Blood pressure (!) 162/64, pulse 84, temperature 98.1 °F (36.7 °C), temperature source Temporal, resp. rate 18, height 5' (1.524 m), weight 97 lb 9.6 oz (44.3 kg), SpO2 96%, not currently breastfeeding.     Ortho Exam:  Dressing is clean and dry.  2+ dorsalis pedis and posterior tibialis pulses.  Sensation is intact distally.  + DF/PF/EHL.  Calf is soft and nontender. Negative Astrid's test.        Data Review:  Recent Results (from the past 8760 hours)   CBC, Platelet; No Differential    Collection Time: 25  6:39 AM   Result Value Ref Range    WBC 14.1 (H) 4.0 - 11.0 x10(3) uL    RBC 3.10 (L) 3.80 - 5.30 x10(6)uL    HGB 8.9 (L) 12.0 - 16.0 g/dL    HCT 30.1 (L) 35.0 - 48.0 %    MCV 97.1 80.0 - 100.0 fL    MCH 28.7 26.0 - 34.0 pg    MCHC 29.6 (L) 31.0 - 37.0 g/dL    RDW 13.0 11.0 - 15.0 %    RDW-SD 45.9 35.1 - 46.3 fL    .0 150.0 - 450.0 10(3)uL     *Note: Due to a large number of results and/or encounters for the requested time period, some results have not been displayed. A complete set of results can be found in Results Review.       Postop XR reviewed.    ASSESSMENT/PLAN:  S/p left hip derrek arthroplasty POD 1  1) Continue pain control  2) Continue DVT prophylaxis with Lovenox in hospital 30 mg Daily while in hospital, then Aspirin 81mg BID for 5 weeks/PRICE hose/ [place]  3) Continue PT/OT, activity as tolerated, WBAT  4) Ice to surgery  site 20 minutes every hour PRN  5) Remove dressing POD#7  4) Dispo: Pending PT/OT and medically stable    Follow up appointment to be scheduled with AJ Marquis NP  6/3/2025  7:37 AM

## 2025-06-04 VITALS
OXYGEN SATURATION: 98 % | RESPIRATION RATE: 16 BRPM | DIASTOLIC BLOOD PRESSURE: 54 MMHG | HEIGHT: 60 IN | HEART RATE: 81 BPM | WEIGHT: 97.63 LBS | BODY MASS INDEX: 19.17 KG/M2 | TEMPERATURE: 98 F | SYSTOLIC BLOOD PRESSURE: 158 MMHG

## 2025-06-04 LAB
ANION GAP SERPL CALC-SCNC: 6 MMOL/L (ref 0–18)
BUN BLD-MCNC: 28 MG/DL (ref 9–23)
BUN/CREAT SERPL: 45.9 (ref 10–20)
CALCIUM BLD-MCNC: 8.9 MG/DL (ref 8.7–10.4)
CHLORIDE SERPL-SCNC: 106 MMOL/L (ref 98–112)
CO2 SERPL-SCNC: 26 MMOL/L (ref 21–32)
CREAT BLD-MCNC: 0.61 MG/DL (ref 0.55–1.02)
EGFRCR SERPLBLD CKD-EPI 2021: 83 ML/MIN/1.73M2 (ref 60–?)
GLUCOSE BLD-MCNC: 145 MG/DL (ref 70–99)
OSMOLALITY SERPL CALC.SUM OF ELEC: 294 MOSM/KG (ref 275–295)
POTASSIUM SERPL-SCNC: 4.3 MMOL/L (ref 3.5–5.1)
SODIUM SERPL-SCNC: 138 MMOL/L (ref 136–145)

## 2025-06-04 PROCEDURE — 97530 THERAPEUTIC ACTIVITIES: CPT

## 2025-06-04 PROCEDURE — 80048 BASIC METABOLIC PNL TOTAL CA: CPT | Performed by: INTERNAL MEDICINE

## 2025-06-04 RX ORDER — TRAMADOL HYDROCHLORIDE 50 MG/1
50 TABLET ORAL EVERY 6 HOURS PRN
Status: DISCONTINUED | OUTPATIENT
Start: 2025-06-04 | End: 2025-06-04

## 2025-06-04 RX ORDER — ASPIRIN 81 MG/1
81 TABLET, CHEWABLE ORAL 2 TIMES DAILY
Qty: 70 TABLET | Refills: 0 | Status: SHIPPED | OUTPATIENT
Start: 2025-06-04

## 2025-06-04 RX ORDER — METOPROLOL SUCCINATE 25 MG/1
25 TABLET, EXTENDED RELEASE ORAL
Qty: 30 TABLET | Refills: 0 | Status: SHIPPED | OUTPATIENT
Start: 2025-06-05

## 2025-06-04 RX ORDER — TRAMADOL HYDROCHLORIDE 50 MG/1
50 TABLET ORAL 2 TIMES DAILY
Qty: 30 TABLET | Refills: 0 | Status: SHIPPED | OUTPATIENT
Start: 2025-06-04

## 2025-06-04 NOTE — DISCHARGE SUMMARY
General Medicine Discharge Summary     Patient ID:  Joana Antony  89 year old  12/9/1930    Admit date: 6/1/2025    Discharge date and time: 6/4/25    Attending Physician: Ghassan Canales MD     Consults: IP CONSULT TO HOSPITALIST  IP CONSULT TO CARDIOLOGY  IP CONSULT TO ORTHOPEDIC SURGERY  IP CONSULT TO SOCIAL WORK  IP CONSULT TO SOCIAL WORK  IP CONSULT TO CASE MANAGEMENT  IP CONSULT TO RESPIRATORY CARE  IP CONSULT TO SOCIAL WORK  IP CONSULT TO SPIRITUAL CARE    Primary Care Physician: Marychuy Finnegan MD     Reason for admission: Mechanical fall with resulting acute displaced mildly impacted left femoral neck fracture     Risk For Readmission: low    Discharge Diagnoses: Palpitations [R00.2]  Closed fracture of neck of left femur, initial encounter ([facility]) [S72.002A]  See Additional Discharge Diagnoses in Hospital Course    Discharged Condition: stable    Follow-up with labs/images appointments: PCP, Ortho     Exam  Gen: No acute distress, elderly frail-appearing  Pulm: Lungs clear, normal respiratory effort  CV: Heart with regular rate and rhythm, systolic murmur present  Abd: Abdomen soft, nontender, nondistended  Ext: left hip dressing c/d/i    HPI: per chart  Patient is a 89 year old female with PMH DVT, hyperlipidemia, GIST, iron deficiency anemia, history of coronary artery disease with PCI in the past aortic stenosis status post TAVR who presents with mechanical fall and left hip fracture.  Earlier last week patient started having left knee pain she saw  As an outpatient underwent left knee injection this seemed to help 10% with regards to her left knee pain, she had an x-ray today in the emergency room of the knee which did not reveal any fractures but some osteoarthritis and chondrocalcinosis.  She lives alone she uses a walker or cane to get around today she lost her balance due to knee pain and fell on her left side she came to the hospital for further evaluation x-ray of the hip revealed  acute displaced and mildly impacted left femoral neck fracture.      Patient is being mated for further operative management per Ortho and cardiology service was consulted due to episode of palpitations and abnormal EKG findings given cardiac history for preop cardiac evaluation.      At this time patient is unsure if she would like to proceed with surgery however she has a goal in mind to continue ambulating independently and I explained to her that surgery may be required to obtain this goal.     Hospital Course:   Patient is a 89 year old female with PMH DVT, hyperlipidemia, GIST, iron deficiency anemia, history of coronary artery disease with PCI in the past aortic stenosis status post TAVR who presents with mechanical fall and left hip fracture s/p left hip hemiarthroplasty. Patient had post op orthostatic hypotension with responded to IVF. Seen by cardiology.  Will continue metop and plan to hold lasix for 1 week. Asa 81mg bid for 5 weeks followed by daily after that.        _______________________________________________________    Chronic pain muscle aches okay to continue tizanidine  - tramadol      GERD no longer taking PPI     Left knee pain exam appears reassuring  - Outpatient follow-up recommended on the calcinosis    Operative Procedures: Procedure(s) (LRB):  LEFT HIP HEMIARTHROPLASTY (Left)     Imaging: XR HIP W OR WO PELVIS 2 OR 3 VIEWS, LEFT (CPT=73502)  Result Date: 6/2/2025  CONCLUSION: Expected postsurgical changes status post recent left hip arthroplasty.    Dictated by (CST): Cecil Vaughn MD on 6/02/2025 at 4:51 PM     Finalized by (CST): Cecil Vaughn MD on 6/02/2025 at 4:51 PM          XR FLUOROSCOPY C-ARM TIME LESS THAN 1 HOUR (CPT=76000)  Result Date: 6/2/2025  CONCLUSION: Intraoperative exam. Please see operative report for further details.     Dictated by (CST): Chris Copeland MD on 6/02/2025 at 4:42 PM     Finalized by (CST): Chris Copeland MD on 6/02/2025 at 4:45 PM          XR  LUMBAR SPINE (MIN 2 VIEWS) (CPT=72100)  Result Date: 6/1/2025  CONCLUSION:  1. No radiographically apparent acute lumbar spine compression fracture. 2. Multilevel lumbar spine degenerative changes with levoscoliosis of the lumbar spine and degenerative anterolisthesis of L5 relative to S1.   Dictated by (CST): Cesario Huynh MD on 6/01/2025 at 5:09 PM     Finalized by (CST): Cesario Huynh MD on 6/01/2025 at 5:11 PM          XR HIP W OR WO PELVIS 2 OR 3 VIEWS, LEFT (CPT=73502)  Result Date: 6/1/2025   Acute displaced and mildly impacted left femoral neck fracture.  Mild bilateral hip osteoarthritis.  Demineralization.  Atherosclerosis.  Osteitis pubis.  Partially imaged lower lumbar spine degeneration.    Dictated by (CST): Cesario Huynh MD on 6/01/2025 at 5:08 PM     Finalized by (CST): Cesario Huynh MD on 6/01/2025 at 5:09 PM          XR KNEE (1 OR 2 VIEWS), LEFT (CPT=73560)  Result Date: 6/1/2025  CONCLUSION:  1. No radiographically visible acute osseous injury of the left knee. 2. Mild-to-moderate tricompartmental osteoarthritis.  There is also chondrocalcinosis, which can be seen in the setting of coexistent crystal deposition arthropathy.   Dictated by (CST): Cesario Huynh MD on 6/01/2025 at 5:07 PM     Finalized by (CST): Cesario Huynh MD on 6/01/2025 at 5:08 PM          Disposition: JEAN    Activity: as tolerated   Diet: general   Wound Care: no needs  Code Status: DNAR/Selective Treatment  O2: no needs    Home Medication Changes: as below   All discharge medications have been reconciled with current medication list.   > 30 minutes spent on dc     Med list     Medication List        START taking these medications      metoprolol succinate ER 25 MG Tb24  Commonly known as: Toprol XL  Take 1 tablet (25 mg total) by mouth Daily Beta Blocker.  Start taking on: June 5, 2025            CHANGE how you take these medications      aspirin 81 MG Chew  Chew 1 tablet (81 mg total) by mouth in the morning and 1  tablet (81 mg total) before bedtime.  What changed: when to take this            CONTINUE taking these medications      acetaminophen 325 MG Tabs  Commonly known as: Tylenol     atorvastatin 10 MG Tabs  Commonly known as: Lipitor  Take 1 tablet (10 mg total) by mouth daily.     ferrous sulfate 325 (65 FE) MG Tbec  Take 1 tablet (325 mg total) by mouth 2 (two) times daily with meals.     One-Daily Multi Vitamins Tabs     tiZANidine HCl 4 MG Caps  Commonly known as: ZANAFLEX  TAKE ONE CAPSULE BY MOUTH THREE TIMES DAILY AS NEEDED     traMADol 50 MG Tabs  Commonly known as: Ultram  Take 1 tablet (50 mg total) by mouth 2 (two) times daily.            STOP taking these medications      furosemide 20 MG Tabs  Commonly known as: Lasix               Where to Get Your Medications        These medications were sent to [id number] [place] [place] [place] [facility] [address] [phone], [phone]  [address], [facility] [address]      Phone: [phone]   aspirin 81 MG Chew  atorvastatin 10 MG Tabs  metoprolol succinate ER 25 MG Tb24       You can get these medications from any pharmacy    Bring a paper prescription for each of these medications  traMADol 50 MG Tabs         FU   Follow-up Information       Skyla Mohr APRN Follow up.    Specialty: Nurse Practitioner  Why: for follow up for left hip surgery with Dr Boateng  Contact information:  [address] [place] [address] [phone]               Marychuy Finnegan MD Follow up.    Specialties: Internal Medicine, PEDIATRICS  Contact information:  [address]  [address]  [place] [address] [phone]                             DC instructions:      Other Discharge Instructions:         Left hip hemiarthroplasty 6/2- instruction per ortho     Dressing removed POD#7  Follow up appointment needs to be scheduled with Esther Mohr NP  WBAT  Deep venous thrombosis prophylaxis: Lovenox in hospital 30 mg Daily while in hospital, then Aspirin 81mg BID for 5 weeks/PRICE  hose/ SCDs  Continue physical therapy  Activity as tolerated  Pain management: Oral meds  Ok to shower no tub soaks  Ice to surgery site 20 minutes every hour PRN    Can resume lasix in 1 week if tolerated.     Patient had opportunity to ask questions and state understand and agree with therapeutic plan as outlined    Thank You,    Ghassan Canales M.D.  [facility]

## 2025-06-04 NOTE — PROGRESS NOTES
[facility]  Progress Note    Joana Antony Patient Status:  Inpatient    1930 MRN [id number]   Location [facility] 4W/SW/SE Attending Ghassan Canales MD   Hosp Day # 3 PCP Marychuy Finnegan MD     SUBJECTIVE:  Interval History: Patient has no current complaints she is sitting comfortable in the chair and states her pain is controlled Patient denies chest pain, shortness of breath and calf pain.    Post-Op Day: 2    OBJECTIVE:  Blood pressure 153/55, pulse 80, temperature 98 °F (36.7 °C), temperature source Temporal, resp. rate 16, height 5' (1.524 m), weight 97 lb 9.6 oz (44.3 kg), SpO2 91%, not currently breastfeeding.     Ortho Exam:  Dressing is clean and dry.  2+ dorsalis pedis and posterior tibialis pulses.  Sensation is intact distally.  + DF/PF/EHL.  Calf is soft and nontender. Negative Astrid's test.        Data Review:  Recent Results (from the past 8760 hours)   CBC, Platelet; No Differential    Collection Time: 25  6:39 AM   Result Value Ref Range    WBC 14.1 (H) 4.0 - 11.0 x10(3) uL    RBC 3.10 (L) 3.80 - 5.30 x10(6)uL    HGB 8.9 (L) 12.0 - 16.0 g/dL    HCT 30.1 (L) 35.0 - 48.0 %    MCV 97.1 80.0 - 100.0 fL    MCH 28.7 26.0 - 34.0 pg    MCHC 29.6 (L) 31.0 - 37.0 g/dL    RDW 13.0 11.0 - 15.0 %    RDW-SD 45.9 35.1 - 46.3 fL    .0 150.0 - 450.0 10(3)uL     *Note: Due to a large number of results and/or encounters for the requested time period, some results have not been displayed. A complete set of results can be found in Results Review.       Postop XR reviewed.    ASSESSMENT/PLAN:  S/p left hip derrek arthroplasty POD 2  1) Continue pain control  2) Continue DVT prophylaxis with Lovenox in hospital 30 mg Daily while in hospital, then Aspirin 81mg BID for 5 weeks/PRICE hose/ [place]  3) Continue PT/OT, activity as tolerated, WBAT  4) Ice to surgery site 20 minutes every hour PRN  5) Remove dressing POD#7  4) Dispo: Pending PT/OT and medically stable     Follow up  appointment to be scheduled with AJ Marquis NP  6/4/2025

## 2025-06-04 NOTE — CONGREGATE LIVING REVIEW
[facility] Living Authorization    The [facility] Review Committee has reviewed this case and the patient IS APPROVED for discharge to a facility for Short Term Skilled once the following procedure is followed:     - The physician discharge instructions (contained within the DANETTE note for SNF) must inlcude the below appropriate and approved COVID instructions to the facility    For questions regarding CLRC approval process, please contact the CM assigned to the case.  For questions regarding RN discharge workflow, please contact the unit Clinical Leader.

## 2025-06-04 NOTE — CM/SW NOTE
Patient discussed in RN DC rounds. SW followed up with [facility] to see if they have a bed for patient today. [facility] does not have a bed until next week. SW provided JEAN list to patient, patient requested SW to call daughter. Daughter was called, daughter requesting [facility]. Sw informed daughter that PP is not available at this time.  Daughter informed SW that she texted lindsey the CM the list of 1-5 JEAN preferences. SW informed daughter that CM/SW can't received test on the phones as they are work phones. Daughter stated she wrote the numbers on the paper in the patients room. SW informed that Critical access hospital is #2 on the list. Daughter is agreeable to [facility]. Daughter is wishing to get patient place on the waiting list for [facility] while patient is at [facility]. Sw reached out to [facility] to inform of above request. SW reserved Critical access hospital via aidin, confirmed bed avail for patient,.     1029am- Daughter was called, provided above update, daughter disagrees, has concerns about discharge, requesting MD to call. MD and team aware.     1048am- Sw received a phone call from daughter requesting to add another emergency contact on the patient's list, Kourtney Parryconcepcion, who was added on. Daughter aware of MD clearing for discharge today.     1115AM- daughter called JADEN again requesting for private room. Sw reached out to [facility], [facility] [place] for private rooms, Hospital Li[facility] informed that there are no privates at this time. JADEN reached out to [facility], who confirmed of private room available today after 3pm.  Daughter and patient aware of above. Both is agreeable. [facility] reserved in aidin. Duly MD that will be following patient at [place] is Dr. Cherry. JADEN ordered transportation of a [facility] through [facility]. [facility] will transport the pt at 3:00 PM to [facility]  PCS form/ flow sheet completed. RN to attach and print out with After Visit Summary  Packet. Patient/family notified transport is not covered by insurance. Pt/family are agreeable to the charges. Quote is 55.00. Daughter is aware.  RN is to call for report at Phone: [phone]    [facility] [phone]       06/04/25 1201   Discharge disposition   Expected discharge disposition subacute   Post Acute Care Provider   (perry)   Discharge transportation [facility]       SW/CM to remain available for support and/or discharge planning.     Marleen Kauffman, MSW, LSW   x [phone]

## 2025-06-04 NOTE — PLAN OF CARE
Patient's blood pressure decreased after 5mg norco administered for left hip pain. Nurse contacted Dr. Pierson, ordered 500mL IV bolus. Blood pressure improved.  Fall precautions in place. Call light in reach.     Problem: Patient Centered Care  Goal: Patient preferences are identified and integrated in the patient's plan of care  Description: Interventions:  - What would you like us to know as we care for you? None stated  - Provide timely, complete, and accurate information to patient/family  - Incorporate patient and family knowledge, values, beliefs, and cultural backgrounds into the planning and delivery of care  - Encourage patient/family to participate in care and decision-making at the level they choose  - Honor patient and family perspectives and choices  Outcome: Progressing     Problem: Patient/Family Goals  Goal: Patient/Family Long Term Goal  Description: Patient's Long Term Goal: free of falls     Interventions:  - fall precautions in place  - See additional Care Plan goals for specific interventions  Outcome: Progressing  Goal: Patient/Family Short Term Goal  Description: Patient's Short Term Goal: free of pain    Interventions:   - monitor pain score   - See additional Care Plan goals for specific interventions  Outcome: Progressing     Problem: PAIN - ADULT  Goal: Verbalizes/displays adequate comfort level or patient's stated pain goal  Description: INTERVENTIONS:  - Encourage pt to monitor pain and request assistance  - Assess pain using appropriate pain scale  - Administer analgesics based on type and severity of pain and evaluate response  - Implement non-pharmacological measures as appropriate and evaluate response  - Consider cultural and social influences on pain and pain management  - Manage/alleviate anxiety  - Utilize distraction and/or relaxation techniques  - Monitor for opioid side effects  - Notify MD/LIP if interventions unsuccessful or patient reports new pain  - Anticipate increased  pain with activity and pre-medicate as appropriate  Outcome: Progressing     Problem: RISK FOR INFECTION - ADULT  Goal: Absence of fever/infection during anticipated neutropenic period  Description: INTERVENTIONS  - Monitor WBC  - Administer growth factors as ordered  - Implement neutropenic guidelines  Outcome: Progressing     Problem: SAFETY ADULT - FALL  Goal: Free from fall injury  Description: INTERVENTIONS:  - Assess pt frequently for physical needs  - Identify cognitive and physical deficits and behaviors that affect risk of falls.  - [place] fall precautions as indicated by assessment.  - Educate pt/family on patient safety including physical limitations  - Instruct pt to call for assistance with activity based on assessment  - Modify environment to reduce risk of injury  - Provide assistive devices as appropriate  - Consider OT/PT consult to assist with strengthening/mobility  - Encourage toileting schedule  Outcome: Progressing     Problem: DISCHARGE PLANNING  Goal: Discharge to home or other facility with appropriate resources  Description: INTERVENTIONS:  - Identify barriers to discharge w/pt and caregiver  - Include patient/family/discharge partner in discharge planning  - Arrange for needed discharge resources and transportation as appropriate  - Identify discharge learning needs (meds, wound care, etc)  - Arrange for interpreters to assist at discharge as needed  - Consider post-discharge preferences of patient/family/discharge partner  - Complete POLST form as appropriate  - Assess patient's ability to be responsible for managing their own health  - Refer to Case Management Department for coordinating discharge planning if the patient needs post-hospital services based on physician/LIP order or complex needs related to functional status, cognitive ability or social support system  Outcome: Progressing

## 2025-06-04 NOTE — PHYSICAL THERAPY NOTE
PHYSICAL THERAPY TREATMENT NOTE - INPATIENT     Room Number: 432/432-A       Presenting Problem: S/P L hip hemiarthroplasty  Co-Morbidities : DVT, hyperlipidemia, GIST, iron deficiency anemia, history of coronary artery disease with PCI in the past aortic stenosis status post TAVR    Problem List  Principal Problem:    Closed fracture of neck of left femur, initial encounter ([facility])  Active Problems:    Palpitations      PHYSICAL THERAPY ASSESSMENT   Patient demonstrates fair progress this session, goals  remain in progress.      Patient is requiring minimal assist and moderate assist as a result of the following impairments: decreased functional strength, decreased endurance/aerobic capacity, pain, impaired static and dynamic sitting and standing balance, decreased muscular endurance, and limited LLE ROM.     Patient continues to function below baseline with bed mobility, transfers, and gait.  Next session anticipate patient to progress bed mobility, transfers, and gait.  Physical Therapy will continue to follow patient for duration of hospitalization.    Patient continues to benefit from continued skilled PT services: to promote return to prior level of function and safety with continuous assistance and gradual rehabilitative therapy .    PLAN DURING HOSPITALIZATION  Nursing Mobility Recommendation : 1 Assist  PT Device Recommendation: Rolling walker  PT Treatment Plan: Bed mobility, Patient education, Gait training, Transfer training  Frequency (Obs): Daily     SUBJECTIVE  Agreeable to activity    OBJECTIVE  Precautions: DAMION global precautions    WEIGHT BEARING RESTRICTION  L Lower Extremity: Weight Bearing as Tolerated      PAIN ASSESSMENT   Rating:  (\"32/10\")  Location: L hip  Management Techniques: Body mechanics, Activity promotion    BALANCE  Static Sitting: Fair  Dynamic Sitting: Fair -  Static Standing: Fair -  Dynamic Standing: Poor +    ACTIVITY TOLERANCE  Pulse: 81  Heart Rate Source: Monitor     BP: (!)  180/57 (162/54 in sitting)  BP Location: Right arm  BP Method: Automatic  Patient Position: Sitting     O2 WALK  Oxygen Therapy  SPO2% on Room Air at Rest: 98    AM-PAC '6-Clicks' INPATIENT SHORT FORM - BASIC MOBILITY  How much difficulty does the patient currently have...  Patient Difficulty: Turning over in bed (including adjusting bedclothes, sheets and blankets)?: A Lot   Patient Difficulty: Sitting down on and standing up from a chair with arms (e.g., wheelchair, bedside commode, etc.): A Lot   Patient Difficulty: Moving from lying on back to sitting on the side of the bed?: A Lot   How much help from another person does the patient currently need...   Help from Another: Moving to and from a bed to a chair (including a wheelchair)?: A Little   Help from Another: Need to walk in hospital room?: A Little   Help from Another: Climbing 3-5 steps with a railing?: Total     AM-PAC Score:  Raw Score: 13   Approx Degree of Impairment: 64.91%   Standardized Score (AM-PAC Scale): 36.74   CMS Modifier (G-Code): CL    FUNCTIONAL ABILITY STATUS  Functional Mobility/Gait Assessment  Gait Assistance: Minimum assistance  Distance (ft): 10 ft  Assistive Device: Rolling walker  Pattern: L Decreased stance time (shuffle, unsteady, decreased step length)  Sit to Stand: moderate assist to RW    Skilled Therapy Provided: Pt rec'd in chair agreeable to therapy, identified by name and , gait belt donned for mobility. Pt reporting feeling lightheaded in chair, vitals taken with elevated BP, RN made aware. Pt requiring Mod A for STS transfers to RW and Min A for ambulation 10 ft with RW. Pt benefited from increased time to perform tasks and to acclimate to changes in position. Pt limited by weakness during mobility and unable to progress gait training at this time. Pt back in chair to conclude session with needs in reach, RN aware of pt status.       The patient's Approx Degree of Impairment: 64.91% has been calculated based on  documentation in the [facility] '6 clicks' Inpatient Daily Activity Short Form.  Research supports that patients with this level of impairment may benefit from rehab.  Final disposition will be made by interdisciplinary medical team.        Patient End of Session: Up in chair, Needs met, Call light within reach, RN aware of session/findings    CURRENT GOALS   Goals to be met by: 6/24/2025  Patient Goal Patient's self-stated goal is: to be walking   Goal #1 Patient is able to demonstrate supine - sit EOB @ level: modified independent   Goal #1   Current Status  in progress   Goal #2 Patient is able to demonstrate transfers Sit to/from Stand at assistance level: modified independent      Goal #2  Current Status  in progress   Goal #3 Patient is able to ambulate 300 feet with assistive device at assistance level: modified independent    Goal #3   Current Status  in progress   Goal #5 Patient verbalizes and/or demonstrates all precautions and safety concerns independently   Goal #5   Current Status  in progress   Goal #6 Patient independently performs home exercise program for ROM/strengthening per the instructions provided in preparation for discharge.   Goal #6  Current Status  ongoing       Therapeutic Activity: 15 minutes

## 2025-06-04 NOTE — PLAN OF CARE
Problem: PAIN - ADULT  Goal: Verbalizes/displays adequate comfort level or patient's stated pain goal  Description: INTERVENTIONS:  - Encourage pt to monitor pain and request assistance  - Assess pain using appropriate pain scale  - Administer analgesics based on type and severity of pain and evaluate response  - Implement non-pharmacological measures as appropriate and evaluate response  - Consider cultural and social influences on pain and pain management  - Manage/alleviate anxiety  - Utilize distraction and/or relaxation techniques  - Monitor for opioid side effects  - Notify MD/LIP if interventions unsuccessful or patient reports new pain  - Anticipate increased pain with activity and pre-medicate as appropriate  Outcome: Progressing     Problem: SAFETY ADULT - FALL  Goal: Free from fall injury  Description: INTERVENTIONS:  - Assess pt frequently for physical needs  - Identify cognitive and physical deficits and behaviors that affect risk of falls.  - [place] fall precautions as indicated by assessment.  - Educate pt/family on patient safety including physical limitations  - Instruct pt to call for assistance with activity based on assessment  - Modify environment to reduce risk of injury  - Provide assistive devices as appropriate  - Consider OT/PT consult to assist with strengthening/mobility  - Encourage toileting schedule  Outcome: Progressing    Patient cleared by internal medicine, ortho surgery, PT/OT, and social work. Going to [facility]. IV removed, discharge education provided, patient sent with all personal belongings, script for tramadol, and discharge instructions. Addressed additional questions. Daughter is present for support. Gave report to Sarah FELDMAN.

## 2025-06-04 NOTE — PROGRESS NOTES
[facility]  part of [facility]    Cardiology Progress Note    Joana Antony Patient Status:  Inpatient    1930 MRN [id number]   Location [facility] 4W/SW/SE Attending Ghassan Canales MD   Hosp Day # 3 PCP Marychuy Finnegan MD       Impression/Plan:  89 year old female presenting with:     Fall leading to left femoral neck fracture; s/p L hemiarthroplasty 2025  CAD s/p PCI left main 23  Aortic stenosis s/p TAVR 20mm bioprosthetic valve ([place] 3 Ultra valve). Moderate regurgitation on last echo   HL  History DVT  HTN  Chronic HFpEF; appears compensated     - Cont statin, bb; asa and diuretic held on admission, resume prior to discharge  - Post-op care per ortho  - Monitor on tele  - Will follow as needed; please call with any questions or concerns     Subjective: No events overnight.  Today patient denies chest pain, palpitations, dyspnea.    Problem List[1]    Objective:   Temp: 98 °F (36.7 °C)  Pulse: 80  Resp: 16  BP: 153/55    Intake/Output:     Intake/Output Summary (Last 24 hours) at 2025 0953  Last data filed at 2025 0803  Gross per 24 hour   Intake 950 ml   Output --   Net 950 ml       Last 3 Weights   25 1839 97 lb 9.6 oz (44.3 kg)   25 1608 94 lb (42.6 kg)   23 0939 99 lb (44.9 kg)   23 1241 99 lb (44.9 kg)   23 2016 99 lb 4.8 oz (45 kg)   23 1715 97 lb 10.6 oz (44.3 kg)   23 1601 95 lb (43.1 kg)         Physical Exam:    General: Alert and oriented x 3. No apparent distress. No respiratory or constitutional distress.  HEENT: Normocephalic, anicteric sclera, neck supple, no thyromegaly or adenopathy.  Neck: No JVD, carotids 2+, no bruits.  Cardiac: Regular rate and rhythm. S1, S2 normal. 2/6 systolic murmur  Lungs: Clear without wheezes, rales, rhonchi or dullness.  Normal excursions and effort.  Abdomen: Soft, non-tender. No organosplenomegally, mass or rebound, BS-present.  Extremities:  Without clubbing, cyanosis or edema.  Peripheral pulses are 2+.  Neurologic: Alert and oriented, normal affect. No motor or coordinational deficit.  Skin: Warm and dry.     Laboratory/Data:    Labs:         Recent Labs   Lab 06/01/25  1618 06/01/25  1810 06/02/25  0632 06/03/25  0639   WBC 19.5*  --  12.5* 14.1*   HGB 9.6*  --  9.4* 8.9*   MCV 94.4  --  94.4 97.1   .0  --  338.0 311.0   INR  --  0.97  --   --        Recent Labs   Lab 06/01/25  1618 06/02/25  0632 06/03/25  0639 06/04/25  0714    140 141 138   K 3.3* 4.3  4.3 4.3 4.3   CL 99 104 110 106   CO2 31.0 30.0 24.0 26.0   BUN 20 16 19 28*   CREATSERUM 0.74 0.59 0.66 0.61   CA 9.5 9.5 9.1 8.9   * 149* 174* 145*       No results for input(s): \"ALT\", \"AST\", \"ALB\", \"AMYLASE\", \"LIPASE\", \"LDH\" in the last 168 hours.    Invalid input(s): \"ALPHOS\", \"TBIL\", \"DBIL\", \"TPROT\"    No results for input(s): \"TROP\" in the last 168 hours.    Allergies:   Allergies[2]    Medications:  Current Hospital Medications[3]    Bandar Raymundo MD  6/4/2025  9:53 AM         [1]   Patient Active Problem List  Diagnosis    Urge incontinence    Osteoarthrosis, unspecified whether generalized or localized, lower leg    Abnormal CXR    Abnormal CT scan, chest    L5-S1 mod diffuse & right large far lateral, L4-5 mod diffuse, L3-4 right mod, L2-3 mod diffuse bugling discs    L4-5 mod central, L3-4 mod-severe central, L2-3 mod central stenosis    L4-5 right large HNP    Spondylolisthesis at L4-L5 level grade 1-2 unstable    Spondylolisthesis at L3-L4 level grade 1 unstable    Spondylolisthesis at L5-S1 level grade 1-2 slightly unstable    Uterine mass    L4-5 & L5-S1 right sided laminectomy    Bilateral leg edema    Brachial neuritis or radiculitis NOS    Unspecified arthropathy, shoulder region    Pain in joint of left shoulder    L5-S1 left severe/right mod-severe, L4-5 right mod-severe, L3-4 right mod, L2-3 left mod foraminal stenosis    bilateral L5-S1 radiculopathies     Right hand pain    Coccydynia    Sacroiliac joint pain    Femoral hernia of left side    Claustrophobia    Drusen (degenerative) of macula, bilateral    Aortic stenosis, moderate    Low back pain at multiple sites    Sprain of sacroiliac ligament, subsequent encounter    Protein-calorie malnutrition, unspecified severity (HCC)    Acute deep vein thrombosis (DVT) of femoral vein of right lower extremity (HCC)    Anemia, unspecified type    Closed fracture of neck of left femur, initial encounter ([facility])    Palpitations   [2]   Allergies  Allergen Reactions    Keflex DIARRHEA    Bumetanide OTHER (SEE COMMENTS)     Constipation    Cefadroxil DIARRHEA     severe    Clindamycin DIARRHEA    Pantoprazole OTHER (SEE COMMENTS)     constipation    Augmentin [Amoxicillin-Pot Clavulanate] DIARRHEA    Prednisone DIARRHEA    Sulfamethoxazole W/Trimethoprim DIARRHEA   [3]   Current Facility-Administered Medications   Medication Dose Route Frequency    traMADol (Ultram) tab 50 mg  50 mg Oral Q6H PRN    metoprolol succinate ER (Toprol XL) 24 hr tab 25 mg  25 mg Oral Daily Beta Blocker    ceFAZolin (Ancef) 2g in 10mL IV syringe premix  2 g Intravenous Once    sennosides (Senokot) tab 17.2 mg  17.2 mg Oral Nightly    docusate sodium (Colace) cap 100 mg  100 mg Oral BID    polyethylene glycol (PEG 3350) (Miralax) 17 g oral packet 17 g  17 g Oral Daily PRN    magnesium hydroxide (Milk of Magnesia) 400 MG/5ML oral suspension 30 mL  30 mL Oral Daily PRN    bisacodyl (Dulcolax) 10 MG rectal suppository 10 mg  10 mg Rectal Daily PRN    fleet enema (Fleet) rectal enema 133 mL  1 enema Rectal Once PRN    ondansetron (Zofran) 4 MG/2ML injection 4 mg  4 mg Intravenous Q6H PRN    metoclopramide (Reglan) 5 mg/mL injection 5 mg  5 mg Intravenous Q8H PRN    diphenhydrAMINE (Benadryl) cap/tab 25 mg  25 mg Oral Q4H PRN    Or    diphenhydrAMINE (Benadryl) 50 mg/mL  injection 12.5 mg  12.5 mg Intravenous Q4H PRN    enoxaparin (Lovenox) 30 MG/0.3ML SUBQ  injection 30 mg  30 mg Subcutaneous Daily    acetaminophen (Tylenol Extra Strength) tab 1,000 mg  1,000 mg Oral Q8H SUSAN    oxyCODONE immediate release tab 2.5 mg  2.5 mg Oral Q4H PRN    Or    oxyCODONE immediate release tab 5 mg  5 mg Oral Q4H PRN    HYDROmorphone (Dilaudid) 1 MG/ML injection 0.2 mg  0.2 mg Intravenous Q2H PRN    Or    HYDROmorphone (Dilaudid) 1 MG/ML injection 0.4 mg  0.4 mg Intravenous Q2H PRN    cyclobenzaprine (Flexeril) tab 5 mg  5 mg Oral Q8H PRN    lidocaine-menthol 4-1 % patch 1 patch  1 patch Transdermal Daily    tiZANidine (Zanaflex) tab 4 mg  4 mg Oral TID PRN    acetaminophen (Tylenol) tab 650 mg  650 mg Oral Q4H PRN    melatonin tab 3 mg  3 mg Oral Nightly PRN    ondansetron (Zofran) 4 MG/2ML injection 4 mg  4 mg Intravenous Q6H PRN    metoclopramide (Reglan) 5 mg/mL injection 5 mg  5 mg Intravenous Q8H PRN    polyethylene glycol (PEG 3350) (Miralax) 17 g oral packet 17 g  17 g Oral Daily PRN    sennosides (Senokot) tab 17.2 mg  17.2 mg Oral Nightly PRN    bisacodyl (Dulcolax) 10 MG rectal suppository 10 mg  10 mg Rectal Daily PRN    fleet enema (Fleet) rectal enema 133 mL  1 enema Rectal Once PRN    morphINE PF 2 MG/ML injection 1 mg  1 mg Intravenous Q2H PRN    Or    morphINE PF 2 MG/ML injection 2 mg  2 mg Intravenous Q2H PRN    Or    morphINE PF 4 MG/ML injection 4 mg  4 mg Intravenous Q2H PRN    atorvastatin (Lipitor) tab 10 mg  10 mg Oral Daily

## 2025-06-05 NOTE — PAYOR COMM NOTE
--------------  DISCHARGE REVIEW    Payor: [place]  Subscriber #:  [id number]  Authorization Number: [id number]    Admit date: 6/1/25  Admit time:   6:22 PM  Discharge Date: 6/4/2025  3:23 PM     Admitting Physician: Evan Alejandro DO  Attending Physician:  No att. providers found  Primary Care Physician: Marychuy Finnegan MD          Discharge Summary Notes        Discharge Summary signed by Ghassan Canales MD at 6/4/2025 11:23 AM       Author: Ghassan Canales MD Specialty: HOSPITALIST Author Type: Physician    Filed: 6/4/2025 11:23 AM Date of Service: 6/4/2025 11:10 AM Status: Signed    : Ghassan Canales MD (Physician)           General Medicine Discharge Summary     Patient ID:  Joana Antony  89 year old  12/9/1930    Admit date: 6/1/2025    Discharge date and time: 6/4/25    Attending Physician: Ghassan Canales MD     Consults: IP CONSULT TO HOSPITALIST  IP CONSULT TO CARDIOLOGY  IP CONSULT TO ORTHOPEDIC SURGERY  IP CONSULT TO SOCIAL WORK  IP CONSULT TO SOCIAL WORK  IP CONSULT TO CASE MANAGEMENT  IP CONSULT TO RESPIRATORY CARE  IP CONSULT TO SOCIAL WORK  IP CONSULT TO SPIRITUAL CARE    Primary Care Physician: Marychuy Finnegan MD     Reason for admission: Mechanical fall with resulting acute displaced mildly impacted left femoral neck fracture     Risk For Readmission: low    Discharge Diagnoses: Palpitations [R00.2]  Closed fracture of neck of left femur, initial encounter ([facility]) [S72.002A]  See Additional Discharge Diagnoses in Hospital Course    Discharged Condition: stable    Follow-up with labs/images appointments: PCP, Ortho     Exam  Gen: No acute distress, elderly frail-appearing  Pulm: Lungs clear, normal respiratory effort  CV: Heart with regular rate and rhythm, systolic murmur present  Abd: Abdomen soft, nontender, nondistended  Ext: left hip dressing c/d/i    HPI: per chart  Patient is a 89 year old female with PMH DVT, hyperlipidemia, GIST, iron deficiency anemia, history of  coronary artery disease with PCI in the past aortic stenosis status post TAVR who presents with mechanical fall and left hip fracture.  Earlier last week patient started having left knee pain she saw  As an outpatient underwent left knee injection this seemed to help 10% with regards to her left knee pain, she had an x-ray today in the emergency room of the knee which did not reveal any fractures but some osteoarthritis and chondrocalcinosis.  She lives alone she uses a walker or cane to get around today she lost her balance due to knee pain and fell on her left side she came to the hospital for further evaluation x-ray of the hip revealed acute displaced and mildly impacted left femoral neck fracture.      Patient is being mated for further operative management per Ortho and cardiology service was consulted due to episode of palpitations and abnormal EKG findings given cardiac history for preop cardiac evaluation.      At this time patient is unsure if she would like to proceed with surgery however she has a goal in mind to continue ambulating independently and I explained to her that surgery may be required to obtain this goal.     Hospital Course:   Patient is a 89 year old female with PMH DVT, hyperlipidemia, GIST, iron deficiency anemia, history of coronary artery disease with PCI in the past aortic stenosis status post TAVR who presents with mechanical fall and left hip fracture s/p left hip hemiarthroplasty. Patient had post op orthostatic hypotension with responded to IVF. Seen by cardiology.  Will continue metop and plan to hold lasix for 1 week. Asa 81mg bid for 5 weeks followed by daily after that.        _______________________________________________________    Chronic pain muscle aches okay to continue tizanidine  - tramadol      GERD no longer taking PPI     Left knee pain exam appears reassuring  - Outpatient follow-up recommended on the calcinosis    Operative Procedures: Procedure(s)  (LRB):  LEFT HIP HEMIARTHROPLASTY (Left)     Imaging: XR HIP W OR WO PELVIS 2 OR 3 VIEWS, LEFT (CPT=73502)  Result Date: 6/2/2025  CONCLUSION: Expected postsurgical changes status post recent left hip arthroplasty.    Dictated by (CST): Cecil Vaughn MD on 6/02/2025 at 4:51 PM     Finalized by (CST): Cecil Vaughn MD on 6/02/2025 at 4:51 PM          XR FLUOROSCOPY C-ARM TIME LESS THAN 1 HOUR (CPT=76000)  Result Date: 6/2/2025  CONCLUSION: Intraoperative exam. Please see operative report for further details.     Dictated by (CST): Chris Copeland MD on 6/02/2025 at 4:42 PM     Finalized by (CST): Chris Copeland MD on 6/02/2025 at 4:45 PM          XR LUMBAR SPINE (MIN 2 VIEWS) (CPT=72100)  Result Date: 6/1/2025  CONCLUSION:  1. No radiographically apparent acute lumbar spine compression fracture. 2. Multilevel lumbar spine degenerative changes with levoscoliosis of the lumbar spine and degenerative anterolisthesis of L5 relative to S1.   Dictated by (CST): Cesario Huynh MD on 6/01/2025 at 5:09 PM     Finalized by (CST): Cesario Huynh MD on 6/01/2025 at 5:11 PM          XR HIP W OR WO PELVIS 2 OR 3 VIEWS, LEFT (CPT=73502)  Result Date: 6/1/2025   Acute displaced and mildly impacted left femoral neck fracture.  Mild bilateral hip osteoarthritis.  Demineralization.  Atherosclerosis.  Osteitis pubis.  Partially imaged lower lumbar spine degeneration.    Dictated by (CST): Cesario Huynh MD on 6/01/2025 at 5:08 PM     Finalized by (CST): Cesario Huynh MD on 6/01/2025 at 5:09 PM          XR KNEE (1 OR 2 VIEWS), LEFT (CPT=73560)  Result Date: 6/1/2025  CONCLUSION:  1. No radiographically visible acute osseous injury of the left knee. 2. Mild-to-moderate tricompartmental osteoarthritis.  There is also chondrocalcinosis, which can be seen in the setting of coexistent crystal deposition arthropathy.   Dictated by (CST): Cesario Huynh MD on 6/01/2025 at 5:07 PM     Finalized by (CST): Cesario Huynh MD on 6/01/2025 at  5:08 PM          Disposition: JEAN    Activity: as tolerated   Diet: general   Wound Care: no needs  Code Status: DNAR/Selective Treatment  O2: no needs    Home Medication Changes: as below   All discharge medications have been reconciled with current medication list.   > 30 minutes spent on dc     Med list     Medication List        START taking these medications      metoprolol succinate ER 25 MG Tb24  Commonly known as: Toprol XL  Take 1 tablet (25 mg total) by mouth Daily Beta Blocker.  Start taking on: June 5, 2025            CHANGE how you take these medications      aspirin 81 MG Chew  Chew 1 tablet (81 mg total) by mouth in the morning and 1 tablet (81 mg total) before bedtime.  What changed: when to take this            CONTINUE taking these medications      acetaminophen 325 MG Tabs  Commonly known as: Tylenol     atorvastatin 10 MG Tabs  Commonly known as: Lipitor  Take 1 tablet (10 mg total) by mouth daily.     ferrous sulfate 325 (65 FE) MG Tbec  Take 1 tablet (325 mg total) by mouth 2 (two) times daily with meals.     One-Daily Multi Vitamins Tabs     tiZANidine HCl 4 MG Caps  Commonly known as: ZANAFLEX  TAKE ONE CAPSULE BY MOUTH THREE TIMES DAILY AS NEEDED     traMADol 50 MG Tabs  Commonly known as: Ultram  Take 1 tablet (50 mg total) by mouth 2 (two) times daily.            STOP taking these medications      furosemide 20 MG Tabs  Commonly known as: Lasix               Where to Get Your Medications        These medications were sent to [id number] - [place], [place] - [address] [phone], [phone]  [address] [place] [address], [facility] [address]      Phone: [phone]   aspirin 81 MG Chew  atorvastatin 10 MG Tabs  metoprolol succinate ER 25 MG Tb24       You can get these medications from any pharmacy    Bring a paper prescription for each of these medications  traMADol 50 MG Tabs         FU   Follow-up Information       Skyla Mohr APRN Follow up.    Specialty: Nurse Practitioner  Why: for follow  up for left hip surgery with Dr Boateng  Contact information:  [address] [place] [address]  [phone]               Marychuy Finnegan MD Follow up.    Specialties: Internal Medicine, PEDIATRICS  Contact information:  [address]  [address]  [place] [id number] [phone]                             DC instructions:      Other Discharge Instructions:         Left hip hemiarthroplasty 6/2- instruction per ortho     Dressing removed POD#7  Follow up appointment needs to be scheduled with Esther Mohr NP  [facility]  Deep venous thrombosis prophylaxis: Lovenox in hospital 30 mg Daily while in hospital, then Aspirin 81mg BID for 5 weeks/PRICE hose/ SCDs  Continue physical therapy  Activity as tolerated  Pain management: Oral meds  Ok to shower no tub soaks  Ice to surgery site 20 minutes every hour PRN    Can resume lasix in 1 week if tolerated.     Patient had opportunity to ask questions and state understand and agree with therapeutic plan as outlined    Thank You,    Ghassan Canales M.D.  [facility]      Electronically signed by Ghassan Canales MD on 6/4/2025 11:23 AM         REVIEWER COMMENTS

## 2025-06-16 ENCOUNTER — EXTERNAL LAB (OUTPATIENT)
Dept: HEALTH INFORMATION MANAGEMENT | Facility: OTHER | Age: OVER 89
End: 2025-06-16

## 2025-06-16 LAB — LAB RESULT: NORMAL

## 2025-06-18 ENCOUNTER — EXTERNAL LAB (OUTPATIENT)
Dept: HEALTH INFORMATION MANAGEMENT | Facility: OTHER | Age: OVER 89
End: 2025-06-18

## 2025-06-18 LAB
BASOPHILS # BLD: 0.08 THO/MM3 (ref 0–0.1)
BASOPHILS NFR BLD: 0.6 %
EOSINOPHIL # BLD: 0.25 THO/MM3 (ref 0–0.5)
EOSINOPHIL NFR BLD: 2 %
ERYTHROCYTE [DISTWIDTH] IN BLOOD: 14.7 % (ref 11.5–15.2)
HCT VFR BLD CALC: 25.7 % (ref 37–47)
HGB BLD-MCNC: 7.2 G/DL (ref 12–16)
IMM GRANULOCYTES # BLD: 0.08 THO/MM3 (ref 0–0.1)
IMM GRANULOCYTES NFR BLD: 0.6 %
LYMPHOCYTES # BLD: 1.03 THO/MM3 (ref 0.8–3)
LYMPHOCYTES NFR BLD: 8.3 %
MCH RBC QN AUTO: 26.7 PG (ref 27–33)
MCHC RBC AUTO-ENTMCNC: 28 G/DL (ref 32–36)
MCV RBC AUTO: 95.2 FL (ref 81–99)
MONOCYTES # BLD: 0.89 THO/MM3 (ref 0.2–1)
MONOCYTES NFR BLD: 7.1 %
NEUTROPHILS # BLD: 10.14 THO/MM3 (ref 1.4–6.8)
NEUTROPHILS NFR BLD: 81.4 %
PLATELET # BLD: 516 THO/MM3 (ref 150–400)
PMV BLD AUTO: 9.3 FL (ref 9.5–13.1)
RBC # BLD: 2.7 MIL/MM3 (ref 4.2–5.4)
WBC # BLD: 12.47 THO/MM3 (ref 4.8–10.8)

## 2025-06-23 ENCOUNTER — EXTERNAL LAB (OUTPATIENT)
Dept: HEALTH INFORMATION MANAGEMENT | Facility: OTHER | Age: OVER 89
End: 2025-06-23

## 2025-06-23 ENCOUNTER — HOSPITAL ENCOUNTER (OUTPATIENT)
Age: OVER 89
Setting detail: OBSERVATION
Discharge: NURSING, REHAB OR LONG-TERM CARE | End: 2025-06-24
Attending: EMERGENCY MEDICINE | Admitting: INTERNAL MEDICINE

## 2025-06-23 DIAGNOSIS — Z79.01 CURRENT USE OF LONG TERM ANTICOAGULATION: ICD-10-CM

## 2025-06-23 DIAGNOSIS — D64.9 ACUTE ON CHRONIC ANEMIA: Primary | ICD-10-CM

## 2025-06-23 DIAGNOSIS — E61.1 IRON DEFICIENCY: ICD-10-CM

## 2025-06-23 DIAGNOSIS — R19.5 HEME POSITIVE STOOL: ICD-10-CM

## 2025-06-23 LAB
ABO + RH BLD: NORMAL
ALBUMIN SERPL-MCNC: 2.3 G/DL (ref 3.4–5)
ALBUMIN/GLOB SERPL: 0.7 {RATIO} (ref 1–2.4)
ALP SERPL-CCNC: 127 UNITS/L (ref 45–117)
ALT SERPL-CCNC: 29 UNITS/L
ANION GAP SERPL CALC-SCNC: 6 MMOL/L (ref 7–19)
AST SERPL-CCNC: 36 UNITS/L
BASOPHILS # BLD: 0 K/MCL (ref 0–0.3)
BASOPHILS # BLD: 0.05 THO/MM3 (ref 0–0.1)
BASOPHILS NFR BLD: 0.4 %
BASOPHILS NFR BLD: 1 %
BILIRUB SERPL-MCNC: 0.2 MG/DL (ref 0.2–1)
BLD GP AB SCN SERPL QL GEL: NEGATIVE
BLOOD EXPIRATION DATE: NORMAL
BUN SERPL-MCNC: 18 MG/DL (ref 6–20)
BUN/CREAT SERPL: 27 (ref 7–25)
CALCIUM SERPL-MCNC: 8.8 MG/DL (ref 8.4–10.2)
CHLORIDE SERPL-SCNC: 103 MMOL/L (ref 97–110)
CO2 SERPL-SCNC: 29 MMOL/L (ref 21–32)
CREAT SERPL-MCNC: 0.66 MG/DL (ref 0.51–0.95)
CROSSMATCH RESULT: NORMAL
DEPRECATED RDW RBC: 54.3 FL (ref 39–50)
DISPENSE STATUS: NORMAL
EGFRCR SERPLBLD CKD-EPI 2021: 81 ML/MIN/{1.73_M2}
EOSINOPHIL # BLD: 0.08 THO/MM3 (ref 0–0.5)
EOSINOPHIL # BLD: 0.1 K/MCL (ref 0–0.5)
EOSINOPHIL NFR BLD: 0.7 %
EOSINOPHIL NFR BLD: 1 %
ERYTHROCYTE [DISTWIDTH] IN BLOOD: 15.9 % (ref 11–15)
ERYTHROCYTE [DISTWIDTH] IN BLOOD: 16.1 % (ref 11.5–15.2)
FASTING DURATION TIME PATIENT: ABNORMAL H
FERRITIN SERPL-MCNC: 45 NG/ML (ref 10–291)
FERRITIN SERPL-MCNC: 67 NG/ML (ref 8–252)
GLOBULIN SER-MCNC: 3.4 G/DL (ref 2–4)
GLUCOSE SERPL-MCNC: 106 MG/DL (ref 70–99)
HCT VFR BLD CALC: 21.3 % (ref 36–46.5)
HCT VFR BLD CALC: 23.1 % (ref 37–47)
HGB BLD-MCNC: 6 G/DL (ref 12–15.5)
HGB BLD-MCNC: 6.4 G/DL (ref 12–16)
HGB RETIC QN AUTO: 23.3 PG (ref 28.6–36.3)
IMM GRANULOCYTES # BLD AUTO: 0 K/MCL (ref 0–0.2)
IMM GRANULOCYTES # BLD: 0 %
IMM GRANULOCYTES # BLD: 0.07 THO/MM3 (ref 0–0.1)
IMM GRANULOCYTES NFR BLD: 0.6 %
IMM RETICS NFR: 31.4 % (ref 1.5–16)
IRON SATN MFR SERPL: 5 % (ref 15–45)
IRON SATN MFR SERPL: 5 % (ref 15–57)
IRON SERPL-MCNC: 15 MCG/DL (ref 50–170)
IRON SERPL-MCNC: 15 UG/DL (ref 45–164)
ISBT BLOOD TYPE: 5100
ISSUE DATE/TIME: NORMAL
LYMPHOCYTES # BLD: 0.25 THO/MM3 (ref 0.8–3)
LYMPHOCYTES # BLD: 0.5 K/MCL (ref 1–4)
LYMPHOCYTES NFR BLD: 2.2 %
LYMPHOCYTES NFR BLD: 6 %
MCH RBC QN AUTO: 26.7 PG (ref 26–34)
MCH RBC QN AUTO: 26.9 PG (ref 27–33)
MCHC RBC AUTO-ENTMCNC: 27.7 G/DL (ref 32–36)
MCHC RBC AUTO-ENTMCNC: 28.2 G/DL (ref 32–36.5)
MCV RBC AUTO: 94.7 FL (ref 78–100)
MCV RBC AUTO: 97.1 FL (ref 81–99)
MONOCYTES # BLD: 0.4 K/MCL (ref 0.3–0.9)
MONOCYTES # BLD: 0.47 THO/MM3 (ref 0.2–1)
MONOCYTES NFR BLD: 4.1 %
MONOCYTES NFR BLD: 5 %
NEUTROPHILS # BLD: 10.46 THO/MM3 (ref 1.4–6.8)
NEUTROPHILS # BLD: 7.2 K/MCL (ref 1.8–7.7)
NEUTROPHILS NFR BLD: 87 %
NEUTROPHILS NFR BLD: 92 %
NRBC BLD MANUAL-RTO: 0 /100 WBC
PLATELET # BLD AUTO: 285 K/MCL (ref 140–450)
PLATELET # BLD: 332 THO/MM3 (ref 150–400)
PMV BLD AUTO: 9.4 FL (ref 9.5–13.1)
POTASSIUM SERPL-SCNC: 4.2 MMOL/L (ref 3.4–5.1)
PRODUCT CODE: NORMAL
PRODUCT DESCRIPTION: NORMAL
PRODUCT ID: NORMAL
PROT SERPL-MCNC: 5.7 G/DL (ref 6.4–8.2)
RBC # BLD: 2.25 MIL/MCL (ref 4–5.2)
RBC # BLD: 2.38 MIL/MM3 (ref 4.2–5.4)
RETICS #: 183 K/MCL (ref 10–120)
RETICS/RBC NFR: 8.2 % (ref 0.3–2.5)
SODIUM SERPL-SCNC: 134 MMOL/L (ref 135–145)
TIBC SERPL-MCNC: 278 UG/DL (ref 245–460)
TIBC SERPL-MCNC: 288 MCG/DL (ref 250–450)
TYPE AND SCREEN EXPIRATION DATE: NORMAL
UNIT BLOOD TYPE: NORMAL
UNIT NUMBER: NORMAL
WBC # BLD: 11.38 THO/MM3 (ref 4.8–10.8)
WBC # BLD: 8.2 K/MCL (ref 4.2–11)

## 2025-06-23 PROCEDURE — 93010 ELECTROCARDIOGRAM REPORT: CPT | Performed by: INTERNAL MEDICINE

## 2025-06-23 PROCEDURE — 80053 COMPREHEN METABOLIC PANEL: CPT | Performed by: EMERGENCY MEDICINE

## 2025-06-23 PROCEDURE — 85025 COMPLETE CBC W/AUTO DIFF WBC: CPT | Performed by: EMERGENCY MEDICINE

## 2025-06-23 PROCEDURE — G0378 HOSPITAL OBSERVATION PER HR: HCPCS

## 2025-06-23 PROCEDURE — 82728 ASSAY OF FERRITIN: CPT | Performed by: EMERGENCY MEDICINE

## 2025-06-23 PROCEDURE — 86850 RBC ANTIBODY SCREEN: CPT | Performed by: EMERGENCY MEDICINE

## 2025-06-23 PROCEDURE — 93005 ELECTROCARDIOGRAM TRACING: CPT | Performed by: EMERGENCY MEDICINE

## 2025-06-23 PROCEDURE — 85046 RETICYTE/HGB CONCENTRATE: CPT | Performed by: EMERGENCY MEDICINE

## 2025-06-23 PROCEDURE — 86923 COMPATIBILITY TEST ELECTRIC: CPT

## 2025-06-23 PROCEDURE — 83540 ASSAY OF IRON: CPT | Performed by: EMERGENCY MEDICINE

## 2025-06-23 PROCEDURE — 99285 EMERGENCY DEPT VISIT HI MDM: CPT | Performed by: EMERGENCY MEDICINE

## 2025-06-23 PROCEDURE — P9016 RBC LEUKOCYTES REDUCED: HCPCS

## 2025-06-23 RX ORDER — 0.9 % SODIUM CHLORIDE 0.9 %
10 VIAL (ML) INJECTION PRN
Status: DISCONTINUED | OUTPATIENT
Start: 2025-06-23 | End: 2025-06-24 | Stop reason: HOSPADM

## 2025-06-23 RX ORDER — ACETAMINOPHEN 650 MG/1
650 SUPPOSITORY RECTAL EVERY 4 HOURS PRN
Status: DISCONTINUED | OUTPATIENT
Start: 2025-06-23 | End: 2025-06-24 | Stop reason: HOSPADM

## 2025-06-23 RX ORDER — SODIUM CHLORIDE 9 MG/ML
INJECTION, SOLUTION INTRAVENOUS CONTINUOUS PRN
Status: ACTIVE | OUTPATIENT
Start: 2025-06-23 | End: 2025-06-24

## 2025-06-23 RX ORDER — ACETAMINOPHEN 325 MG/1
650 TABLET ORAL EVERY 4 HOURS PRN
Status: DISCONTINUED | OUTPATIENT
Start: 2025-06-23 | End: 2025-06-24 | Stop reason: HOSPADM

## 2025-06-23 RX ORDER — 0.9 % SODIUM CHLORIDE 0.9 %
2 VIAL (ML) INJECTION EVERY 12 HOURS SCHEDULED
Status: DISCONTINUED | OUTPATIENT
Start: 2025-06-24 | End: 2025-06-24 | Stop reason: HOSPADM

## 2025-06-23 SDOH — SOCIAL STABILITY: SOCIAL NETWORK
HOW OFTEN DO YOU SEE OR TALK TO PEOPLE THAT YOU CARE ABOUT AND FEEL CLOSE TO? (FOR EXAMPLE: TALKING TO FRIENDS ON THE PHONE, VISITING FRIENDS OR FAMILY, GOING TO CHURCH OR CLUB MEETINGS): 3 TO 5 TIMES A WEEK

## 2025-06-23 SDOH — SOCIAL STABILITY: SOCIAL INSECURITY: HOW OFTEN DOES ANYONE, INCLUDING FAMILY AND FRIENDS, PHYSICALLY HURT YOU?: NEVER

## 2025-06-23 SDOH — SOCIAL STABILITY: SOCIAL INSECURITY: HOW OFTEN DOES ANYONE, INCLUDING FAMILY AND FRIENDS, SCREAM OR CURSE AT YOU?: NEVER

## 2025-06-23 SDOH — SOCIAL STABILITY: SOCIAL INSECURITY: HOW OFTEN DOES ANYONE, INCLUDING FAMILY AND FRIENDS, INSULT OR TALK DOWN TO YOU?: NEVER

## 2025-06-23 SDOH — SOCIAL STABILITY: SOCIAL INSECURITY: HOW OFTEN DOES ANYONE, INCLUDING FAMILY AND FRIENDS, THREATEN YOU WITH HARM?: NEVER

## 2025-06-23 ASSESSMENT — ORIENTATION MEMORY CONCENTRATION TEST (OMCT)
WHAT YEAR IS IT NOW (MUST BE EXACT): CORRECT
OMCT SCORE: 4
WHAT TIME IS IT (NO WATCH OR CLOCK): CORRECT
OMCT INTERPRETATION: 0-6: NO SIGNIFICANT IMPAIRMENT
REPEAT THE NAME AND ADDRESS I ASKED YOU TO REMEMBER: 2 ERRORS
WHAT MONTH IS IT NOW: CORRECT
COUNT BACKWARDS FROM 20 TO 1: CORRECT
SAY THE MONTHS IN REVERSE ORDER STARTING WITH LAST MONTH: CORRECT

## 2025-06-23 ASSESSMENT — LIFESTYLE VARIABLES
HOW OFTEN DO YOU HAVE A DRINK CONTAINING ALCOHOL: NEVER
HOW MANY STANDARD DRINKS CONTAINING ALCOHOL DO YOU HAVE ON A TYPICAL DAY: 0,1 OR 2
ALCOHOL_USE_STATUS: NO OR LOW RISK WITH VALIDATED TOOL
AUDIT-C TOTAL SCORE: 0
HOW OFTEN DO YOU HAVE 6 OR MORE DRINKS ON ONE OCCASION: NEVER

## 2025-06-23 ASSESSMENT — PAIN SCALES - GENERAL
PAINLEVEL_OUTOF10: 0
PAINLEVEL_OUTOF10: 0

## 2025-06-23 ASSESSMENT — ACTIVITIES OF DAILY LIVING (ADL)
ADL_SCORE: 12
ADL_SHORT_OF_BREATH: NO
RECENT_DECLINE_ADL: NO
ADL_BEFORE_ADMISSION: INDEPENDENT

## 2025-06-24 VITALS
DIASTOLIC BLOOD PRESSURE: 53 MMHG | RESPIRATION RATE: 18 BRPM | WEIGHT: 108.47 LBS | TEMPERATURE: 98.2 F | HEIGHT: 60 IN | HEART RATE: 82 BPM | SYSTOLIC BLOOD PRESSURE: 109 MMHG | BODY MASS INDEX: 21.3 KG/M2 | OXYGEN SATURATION: 96 %

## 2025-06-24 LAB
ANION GAP SERPL CALC-SCNC: 5 MMOL/L (ref 7–19)
ATRIAL RATE (BPM): 82
BUN SERPL-MCNC: 19 MG/DL (ref 6–20)
BUN/CREAT SERPL: 37 (ref 7–25)
CALCIUM SERPL-MCNC: 8.4 MG/DL (ref 8.4–10.2)
CHLORIDE SERPL-SCNC: 103 MMOL/L (ref 97–110)
CO2 SERPL-SCNC: 30 MMOL/L (ref 21–32)
CREAT SERPL-MCNC: 0.51 MG/DL (ref 0.51–0.95)
DEPRECATED RDW RBC: 51 FL (ref 39–50)
EGFRCR SERPLBLD CKD-EPI 2021: 86 ML/MIN/{1.73_M2}
ERYTHROCYTE [DISTWIDTH] IN BLOOD: 15.2 % (ref 11–15)
FASTING DURATION TIME PATIENT: ABNORMAL H
GLUCOSE SERPL-MCNC: 106 MG/DL (ref 70–99)
HCT VFR BLD CALC: 26.2 % (ref 36–46.5)
HGB BLD-MCNC: 7.7 G/DL (ref 12–15.5)
MCH RBC QN AUTO: 27.4 PG (ref 26–34)
MCHC RBC AUTO-ENTMCNC: 29.4 G/DL (ref 32–36.5)
MCV RBC AUTO: 93.2 FL (ref 78–100)
NRBC BLD MANUAL-RTO: 0 /100 WBC
P AXIS (DEGREES): 80
PLATELET # BLD AUTO: 255 K/MCL (ref 140–450)
POTASSIUM SERPL-SCNC: 4.2 MMOL/L (ref 3.4–5.1)
PR-INTERVAL (MSEC): 202
QRS-INTERVAL (MSEC): 88
QT-INTERVAL (MSEC): 356
QTC: 416
R AXIS (DEGREES): 48
RAINBOW EXTRA TUBES HOLD SPECIMEN: NORMAL
RBC # BLD: 2.81 MIL/MCL (ref 4–5.2)
REPORT TEXT: NORMAL
SODIUM SERPL-SCNC: 134 MMOL/L (ref 135–145)
T AXIS (DEGREES): 53
VENTRICULAR RATE EKG/MIN (BPM): 82
WBC # BLD: 6.8 K/MCL (ref 4.2–11)

## 2025-06-24 PROCEDURE — G0378 HOSPITAL OBSERVATION PER HR: HCPCS

## 2025-06-24 PROCEDURE — 36415 COLL VENOUS BLD VENIPUNCTURE: CPT | Performed by: INTERNAL MEDICINE

## 2025-06-24 PROCEDURE — 80048 BASIC METABOLIC PNL TOTAL CA: CPT | Performed by: INTERNAL MEDICINE

## 2025-06-24 PROCEDURE — 10002803 HB RX 637: Performed by: INTERNAL MEDICINE

## 2025-06-24 PROCEDURE — 10004651 HB RX, NO CHARGE ITEM: Performed by: INTERNAL MEDICINE

## 2025-06-24 PROCEDURE — 99222 1ST HOSP IP/OBS MODERATE 55: CPT | Performed by: HOSPITALIST

## 2025-06-24 PROCEDURE — 10002803 HB RX 637: Performed by: HOSPITALIST

## 2025-06-24 PROCEDURE — 85027 COMPLETE CBC AUTOMATED: CPT | Performed by: INTERNAL MEDICINE

## 2025-06-24 RX ORDER — DOXYCYCLINE 100 MG/1
100 CAPSULE ORAL 2 TIMES DAILY
COMMUNITY

## 2025-06-24 RX ORDER — ATORVASTATIN CALCIUM 20 MG/1
10 TABLET, FILM COATED ORAL DAILY
Status: DISCONTINUED | OUTPATIENT
Start: 2025-06-24 | End: 2025-06-24 | Stop reason: HOSPADM

## 2025-06-24 RX ORDER — DOXYCYCLINE 100 MG/1
100 CAPSULE ORAL 2 TIMES DAILY
Status: DISCONTINUED | OUTPATIENT
Start: 2025-06-24 | End: 2025-06-24 | Stop reason: HOSPADM

## 2025-06-24 RX ORDER — FERROUS SULFATE 325(65) MG
325 TABLET ORAL
Status: DISCONTINUED | OUTPATIENT
Start: 2025-06-24 | End: 2025-06-24 | Stop reason: HOSPADM

## 2025-06-24 RX ORDER — DOXYCYCLINE 100 MG/1
100 CAPSULE ORAL 2 TIMES DAILY
Status: ON HOLD | COMMUNITY
Start: 2025-06-19 | End: 2025-06-24

## 2025-06-24 RX ORDER — ASPIRIN 81 MG/1
81 TABLET, CHEWABLE ORAL 2 TIMES DAILY
Qty: 30 TABLET | Refills: 0 | Status: CANCELLED
Start: 2025-06-24

## 2025-06-24 RX ORDER — TIZANIDINE 2 MG/1
2 TABLET ORAL 3 TIMES DAILY
Status: DISCONTINUED | OUTPATIENT
Start: 2025-06-24 | End: 2025-06-24 | Stop reason: HOSPADM

## 2025-06-24 RX ORDER — ROSUVASTATIN CALCIUM 10 MG/1
10 TABLET, COATED ORAL AT BEDTIME
COMMUNITY

## 2025-06-24 RX ORDER — FUROSEMIDE 20 MG/1
20 TABLET ORAL DAILY
Status: DISCONTINUED | OUTPATIENT
Start: 2025-06-24 | End: 2025-06-24 | Stop reason: HOSPADM

## 2025-06-24 RX ORDER — ACETAMINOPHEN 325 MG/1
650 TABLET ORAL EVERY 6 HOURS PRN
COMMUNITY

## 2025-06-24 RX ORDER — OMEPRAZOLE 40 MG/1
40 CAPSULE, DELAYED RELEASE ORAL 2 TIMES DAILY WITH MEALS
Qty: 60 CAPSULE | Refills: 0 | Status: SHIPPED | OUTPATIENT
Start: 2025-06-24

## 2025-06-24 RX ADMIN — FERROUS SULFATE TAB 325 MG (65 MG ELEMENTAL FE) 325 MG: 325 (65 FE) TAB at 14:59

## 2025-06-24 RX ADMIN — DOXYCYCLINE 100 MG: 100 CAPSULE ORAL at 14:59

## 2025-06-24 RX ADMIN — TIZANIDINE 2 MG: 2 TABLET ORAL at 12:11

## 2025-06-24 RX ADMIN — ACETAMINOPHEN 650 MG: 325 TABLET ORAL at 12:10

## 2025-06-24 RX ADMIN — SODIUM CHLORIDE 2 ML: 9 INJECTION, SOLUTION INTRAMUSCULAR; INTRAVENOUS; SUBCUTANEOUS at 12:11

## 2025-06-24 RX ADMIN — ATORVASTATIN CALCIUM 10 MG: 20 TABLET, FILM COATED ORAL at 12:10

## 2025-06-24 SDOH — ECONOMIC STABILITY: INCOME INSECURITY: IN THE PAST 12 MONTHS, HAS THE ELECTRIC, GAS, OIL, OR WATER COMPANY THREATENED TO SHUT OFF SERVICE IN YOUR HOME?: NO

## 2025-06-24 SDOH — ECONOMIC STABILITY: GENERAL

## 2025-06-24 SDOH — ECONOMIC STABILITY: TRANSPORTATION INSECURITY
IN THE PAST 12 MONTHS, HAS LACK OF RELIABLE TRANSPORTATION KEPT YOU FROM MEDICAL APPOINTMENTS, MEETINGS, WORK OR FROM GETTING THINGS NEEDED FOR DAILY LIVING?: NO

## 2025-06-24 SDOH — HEALTH STABILITY: PHYSICAL HEALTH: DO YOU HAVE DIFFICULTY DRESSING OR BATHING?: YES

## 2025-06-24 SDOH — HEALTH STABILITY: GENERAL: BECAUSE OF A PHYSICAL, MENTAL, OR EMOTIONAL CONDITION, DO YOU HAVE DIFFICULTY DOING ERRANDS ALONE?: YES

## 2025-06-24 SDOH — ECONOMIC STABILITY: FOOD INSECURITY: WITHIN THE PAST 12 MONTHS, THE FOOD YOU BOUGHT JUST DIDN'T LAST AND YOU DIDN'T HAVE MONEY TO GET MORE.: NEVER TRUE

## 2025-06-24 SDOH — ECONOMIC STABILITY: HOUSING INSECURITY: DO YOU HAVE PROBLEMS WITH ANY OF THE FOLLOWING?: NONE OF THE ABOVE

## 2025-06-24 SDOH — HEALTH STABILITY: PHYSICAL HEALTH: DO YOU HAVE SERIOUS DIFFICULTY WALKING OR CLIMBING STAIRS?: YES

## 2025-06-24 SDOH — ECONOMIC STABILITY: HOUSING INSECURITY: WHAT IS YOUR LIVING SITUATION TODAY?: ALONE

## 2025-06-24 SDOH — ECONOMIC STABILITY: HOUSING INSECURITY: WHAT IS YOUR LIVING SITUATION TODAY?: I HAVE A STEADY PLACE TO LIVE

## 2025-06-24 SDOH — ECONOMIC STABILITY: HOUSING INSECURITY: WHAT IS YOUR LIVING SITUATION TODAY?: HOUSE

## 2025-06-24 SDOH — SOCIAL STABILITY: SOCIAL NETWORK: SUPPORT SYSTEMS: CHILDREN;FAMILY MEMBERS

## 2025-06-24 ASSESSMENT — ENCOUNTER SYMPTOMS
ABDOMINAL PAIN: 0
CONSTIPATION: 0
COUGH: 0
NUMBNESS: 0
HEADACHES: 0
CHILLS: 0
FATIGUE: 0
DIZZINESS: 0
WEAKNESS: 0
NAUSEA: 0
FEVER: 0
SORE THROAT: 0
WHEEZING: 0
VOMITING: 0
DIARRHEA: 0
RHINORRHEA: 0
SHORTNESS OF BREATH: 0

## 2025-06-24 ASSESSMENT — PATIENT HEALTH QUESTIONNAIRE - PHQ9
2. FEELING DOWN, DEPRESSED OR HOPELESS: NOT AT ALL
IS PATIENT ABLE TO COMPLETE PHQ2 OR PHQ9: YES
1. LITTLE INTEREST OR PLEASURE IN DOING THINGS: NOT AT ALL
SUM OF ALL RESPONSES TO PHQ9 QUESTIONS 1 AND 2: 0
SUM OF ALL RESPONSES TO PHQ9 QUESTIONS 1 AND 2: 0
CLINICAL INTERPRETATION OF PHQ2 SCORE: NO FURTHER SCREENING NEEDED

## 2025-06-24 ASSESSMENT — COLUMBIA-SUICIDE SEVERITY RATING SCALE - C-SSRS
1. WITHIN THE PAST MONTH, HAVE YOU WISHED YOU WERE DEAD OR WISHED YOU COULD GO TO SLEEP AND NOT WAKE UP?: NO
6. HAVE YOU EVER DONE ANYTHING, STARTED TO DO ANYTHING, OR PREPARED TO DO ANYTHING TO END YOUR LIFE?: NO
2. HAVE YOU ACTUALLY HAD ANY THOUGHTS OF KILLING YOURSELF?: NO
IS THE PATIENT ABLE TO COMPLETE C-SSRS: YES

## 2025-06-24 ASSESSMENT — PAIN SCALES - GENERAL
PAINLEVEL_OUTOF10: 0
PAINLEVEL_OUTOF10: 8

## (undated) DEVICE — GUIDEWIRE STRT 10CM 260CM J CRV TPR .035IN VASC PTFE PERIPH

## (undated) DEVICE — BAG WST 48IN SPK FLTR RLLR CLAMP FEMALE LL TUBE VENT MERIT

## (undated) DEVICE — NEEDLE BIOPSY ACQUIRE OD22 GA

## (undated) DEVICE — NEEDLE HPO 20GA 1.5IN REG WALL REG BVL LL HUB DEHP-FR STRL

## (undated) DEVICE — PACK,UNIVERSAL,SPLIT,II: Brand: MEDLINE

## (undated) DEVICE — CATHETER BLN MNRL NC EMERGE 3.5MM 143CM 8MM 2 LUM TPR TIP LL

## (undated) DEVICE — GOWN SURG XL L4 IMPRV REINFORCE SET IN SLV STRL LF DISP BLUE

## (undated) DEVICE — DECANTER BAG 9": Brand: MEDLINE INDUSTRIES, INC.

## (undated) DEVICE — GUIDEWIRE GLDWR 3CM 180CM 1.5MM J CRV STD .035IN VASC NTNL

## (undated) DEVICE — PERCLOSE™ PROSTYLE™ SUTURE-MEDIATED CLOSURE AND REPAIR SYSTEM
Type: IMPLANTABLE DEVICE | Site: FEMORAL ARTERY | Status: NON-FUNCTIONAL
Brand: PERCLOSE™ PROSTYLE™

## (undated) DEVICE — SUT STRATAFIX SYMMTRC PDS+ 1 18IN CTX ABSRB V

## (undated) DEVICE — DRESSING TRANS 4.75X4IN ADH HPOAL WTPRF TEGADERM PU STD STRL

## (undated) DEVICE — GUIDEWIRE VASCULAR SAVVYWIRE 3.2CM XSMALL 2.9CM PRE-SHAPED 1ST GEN INTEGRATED PRESSURE SENSOR FTAVR SOLUTION

## (undated) DEVICE — CABLE TRNDCR TRANSPAC PVC MALE TO FEMALE CNCT HPRS

## (undated) DEVICE — SUT STRATAFIX SPRL MCRYL+ 2-0 18IN CT-1 ABSRB

## (undated) DEVICE — CONMED SCOPE SAVER BITE BLOCK, 20X27 MM: Brand: SCOPE SAVER

## (undated) DEVICE — Device

## (undated) DEVICE — WOUND RETRACTOR AND PROTECTOR: Brand: ALEXIS O WOUND PROTECTOR-RETRACTOR

## (undated) DEVICE — 3M™ STERI-STRIP™ REINFORCED ADHESIVE SKIN CLOSURES, R1547, 1/2 IN X 4 IN (12 MM X 100 MM), 6 STRIPS/ENVELOPE: Brand: 3M™ STERI-STRIP™

## (undated) DEVICE — GLOVE SURG 7.5 PROTEXIS LF CRM PF SMTH BEAD CUFF STRL

## (undated) DEVICE — PAD PERINL PST FOAM DISP FOR AMSCO SURG TBL

## (undated) DEVICE — SYRINGE 20ML GRAD STRL MED DISP LL

## (undated) DEVICE — COVER PROBE FLX-FEEL 58X6IN OR 4 FLAG 2 SHT 24 PRINT STRL LF

## (undated) DEVICE — GUIDEWIRE RUNTHROUGH 3CM 180CM .36MM VASC RADOPQ X FLPY STRL

## (undated) DEVICE — KIT MICROINTRODUCER 4FR 21GA 40CM 4CM .018IN SMTH NDL MNDRL

## (undated) DEVICE — MEDI-VAC NON-CONDUCTIVE SUCTION TUBING 6MM X 1.8M (6FT.) L: Brand: CARDINAL HEALTH

## (undated) DEVICE — ELECTRODE DFBR UNV 15.2X10.8CM ADH PAD RDTRNS POUCH PADPRO

## (undated) DEVICE — CANNULA NSL ADPR SET NOMOLINE

## (undated) DEVICE — SYRINGE 50ML GRAD N-PYRG DEHP-FR PVC FREE STRL MED LF DISP

## (undated) DEVICE — GUIDEWIRE AMP SPST STRGT .035IN 260CM URO 7CM

## (undated) DEVICE — SHEATH 7FR 10CM 2.5CM .035IN INTRO SNAP ON DIL LOCK KINK RST

## (undated) DEVICE — BASIN MED GRAD 32OZ BLUE STRL

## (undated) DEVICE — SHEATH 5FR 10CM 2.5CM .038IN GUIDE SNAP ON DIL LOCK KINK RST

## (undated) DEVICE — KIT CLEAN ENDOKIT 1.1OZ GOWNX2

## (undated) DEVICE — GUIDEWIRE 150CM 3MM RDS J CRV .035IN VASC PTFE FX CORE LF

## (undated) DEVICE — DECANTER FLUID DSPNSR BAG BAJ DISP STRL LF ASEPTIC TRNSF

## (undated) DEVICE — Device: Brand: DUAL NARE NASAL CANNULAE FEMALE LUER CON 7FT O2 TUBE

## (undated) DEVICE — SHEET,DRAPE,53X77,STERILE: Brand: MEDLINE

## (undated) DEVICE — HANDPIECE SET WITH HIGH FLOW TIP AND SUCTION TUBE: Brand: INTERPULSE

## (undated) DEVICE — DRAPE 2 INCS FILM ANTIMICROBIAL 33X23IN SURG IOBAN STRL

## (undated) DEVICE — VALVE GUARDIAN 2 VSI HEMOSTASIS 8FR GW INS TOOL ROTOLOCK

## (undated) DEVICE — HEMOSTAT CMPR VASC REG 24CM

## (undated) DEVICE — GLOVE SURG 7 PROTEXIS LF CRM PF BEAD CUFF STRL PLISPRN 12IN

## (undated) DEVICE — BLANKET WRM UNDERBODY ADULT 221X91IN 24X48IN BR HGR PLMR 7OZ

## (undated) DEVICE — KIT INTRO 6FR 21GA 10CM 45CM .021IN 35MM FLEX STRGT SHTH DIL

## (undated) DEVICE — KIT SYRINGE 3ML ABG LL TIP LN DRAW FLTRPRO DRY LIHEP DISP

## (undated) DEVICE — CATHETER 6FR PGTL CRV 110CM WIRE BRAID ROBUST SHAFT EXPO

## (undated) DEVICE — NEEDLE SPNL 18GA L3.5IN W/ QNCKE SHARPER BVL

## (undated) DEVICE — ZZ-CONVERTED-TO-522442- SPONGE 4X4 10PK

## (undated) DEVICE — GAMMEX® PI HYBRID SIZE 9, STERILE POWDER-FREE SURGICAL GLOVE, POLYISOPRENE AND NEOPRENE BLEND: Brand: GAMMEX

## (undated) DEVICE — GLOVE SURG 8 PROTEXIS LF CRM PF BEAD CUFF STRL PLISPRN 12IN

## (undated) DEVICE — KIT MICROINTRODUCER 4FR .018IN 40CM 7CM .018IN SFTP MNDRL

## (undated) DEVICE — SPONGE GAUZE 4X4IN CTN 16 PLY WOVEN FOLD EDGE STRL LF

## (undated) DEVICE — DRAPE 2 INCS FILM ANTIMICROBIAL 23X17IN SURG IOBAN STRL

## (undated) DEVICE — CRIMPER CATH STRL LF DISP

## (undated) DEVICE — NEEDLE HPO 18GA 1.5IN REG WALL REG BVL LL HUB CLR CD DEHP-FR

## (undated) DEVICE — SHEATH 6FR 10CM 2.5CM .035IN INTRO SNAP ON DIL LOCK KINK RST

## (undated) DEVICE — PACK INFECTION CTL CUST GSM

## (undated) DEVICE — CATHETER 6FR 145D PGTL CRV 110CM 2 WIRE BRAID STIFF PROX

## (undated) DEVICE — SYRINGE 3ML SCL MARK INDIV WRAP STRL MED LF LL

## (undated) DEVICE — DEVICE GTWY ENCORE ADV 20ML KIT TRQ GW INTRO INFL 20MM 3MM

## (undated) DEVICE — 35 ML SYRINGE LUER-LOCK TIP: Brand: MONOJECT

## (undated) DEVICE — 1010 S-DRAPE TOWEL DRAPE 10/BX: Brand: STERI-DRAPE™

## (undated) DEVICE — ELECTRODE PT RTN C30- LB 9FT CORD NONIRRITATE NONSENSITIZE

## (undated) DEVICE — BIPOLAR SEALER 23-112-1 AQM 6.0: Brand: AQUAMANTYS ®

## (undated) DEVICE — 60 ML SYRINGE REGULAR TIP: Brand: MONOJECT

## (undated) DEVICE — ADAPTER TBG 2 MALE LL DEHP-FR STRL LF MEDEX 1IN DISP .1ML IV

## (undated) DEVICE — 450 ML BOTTLE OF 0.05% CHLORHEXIDINE GLUCONATE IN 99.95% STERILE WATER FOR IRRIGATION, USP AND APPLICATOR.: Brand: IRRISEPT ANTIMICROBIAL WOUND LAVAGE

## (undated) DEVICE — SET XTN 3ML 20IN MED STD BORE 2 MALE LL PINCH CLAMP DEHP-FR

## (undated) DEVICE — SOLUTION IRRIG 1000ML 0.9% NACL USP BTL

## (undated) DEVICE — CATHETER BLN NC TREK 4.5MM 8MM RX RADOPQ SMTH RND TIP PTCA

## (undated) DEVICE — Device: Brand: STABLECUT®

## (undated) DEVICE — SOLUTION IRRIG 3000ML 0.9% NACL FLX CONT

## (undated) DEVICE — CATHETER US EE PLATINUM 3.3-2.9FR 20MM 150CM 24CM 20MHZ RX

## (undated) DEVICE — SYRINGE 5ML GRAD N-PYRG DEHP-FR PVC FREE STRL MED LF DISP LL

## (undated) DEVICE — SOLUTION IV 1000ML 0.9% NACL PRESERVATIVE

## (undated) DEVICE — IMMOBILIZER KN UNV 18IN REBOUND NS LF REUSE

## (undated) DEVICE — MINI VERSALIGHT W/ EXT FRAZ TIP 4.5MM

## (undated) DEVICE — CABLE PCNG 12FT ALGTR CLIP STRL LF DISP

## (undated) DEVICE — DRESSING GAUZE 1.5X1.5IN HMST QUIKCLOT

## (undated) DEVICE — SYRINGE 10ML GRAD N-PYRG DEHP-FR PVC FREE STRL MED LF DISP

## (undated) DEVICE — CONTAINER,SPECIMEN,OR STERILE,4OZ: Brand: MEDLINE

## (undated) DEVICE — DRAPE,UTILITY,TAPE,15X26,STERILE: Brand: MEDLINE

## (undated) DEVICE — SUT COAT VCRL+ 1 18IN CTX ABSRB VLT ANTIBACT

## (undated) DEVICE — SUT STRATAFIX SYMMTRC PDS + 0 18IN ABSRB

## (undated) DEVICE — 2% CHLORHEXIDINE SKIN PREP ORANGE 26ML

## (undated) DEVICE — WAX BNE 2.5GM BEESWAX PAR AND ISO PALMITATE

## (undated) DEVICE — CATHETER 6FR JR4 CRV 100CM LG INNER LUM RADOPQ SLCT INFNT

## (undated) DEVICE — SUT MCRYL 3-0 18IN PS-2 ABSRB UD 19MM 3/8 CIR

## (undated) DEVICE — GLOVE SURG 6.5 PROTEXIS LF CRM PF BEAD CUFF STRL PLISPRN

## (undated) DEVICE — STOPCOCK IV DARK BLUE .082IN MARQUIS 1050 PSI PLYCRB 3W HPRS

## (undated) DEVICE — SHEET TRNSF 1960X760MM 480MM MAXITUBE FLITES PRM TUBE LF

## (undated) DEVICE — CATH IMPULSE FL3.5 6F 100CM

## (undated) DEVICE — SAFEAIR TELESCOPIC SMOKE EVACUATION PENCIL, COATED, 70MM BLADE, PUSH BUTTON: Brand: STRYKER

## (undated) DEVICE — SUTURE PRMHND 0 SH 30IN SILK BRAID NABSB BLK K834H

## (undated) DEVICE — SYRINGE ANGIO LF MARK 7 ARTERION

## (undated) DEVICE — DRAPE L4 APRTR BRR PRTC 42X29IN 4.5X3.5IN SURG ACTI-GARD

## (undated) DEVICE — DRESSING SUR 9X25CM SIL SP CVR WTRPRF VIR

## (undated) DEVICE — CONTAINER SPEC 4OZ GRAY STD SCR TOP LID GRAD TRANS STRL

## (undated) DEVICE — SYRINGE 8ML ROTATE MALE LL ADPR RING GRIP MED INJECT8 CCS

## (undated) DEVICE — CATHETER 6FR AL1 CRV 100CM LG INNER LUM RADOPQ SLCT INFNT

## (undated) DEVICE — SUT COAT VCRL 0 27IN CT-1 ABSRB VLT 36MM 1/2

## (undated) DEVICE — SOLUTION PREP 70% ISO ALC 4OZ LF

## (undated) DEVICE — HOOD: Brand: FLYTE

## (undated) DEVICE — SET ESHEATH INTRD

## (undated) DEVICE — APPLICATOR PREP 26ML CHG 2% ISO ALC 70%

## (undated) DEVICE — GUIDEWIRE 150CM .035IN VASC PTFE XFRM TIP FX CORE LF

## (undated) DEVICE — DRAPE 2 ADH PATCH FLRSCP 35X43IN EQUIPMENT STRDRP STRL CLR

## (undated) DEVICE — PACK CDS ANTERIOR HIP

## (undated) NOTE — LETTER
[facility]  [address], [place], [place]    Authorization for Surgical Operation and Procedure                               I hereby authorize Randall Boateng DO, my physician and his/her assistants (if applicable), which may include medical students, residents, and/or fellows, to perform the following surgical operation/ procedure and administer such anesthesia as may be determined necessary by my physician: Operation/Procedure name (s) LEFT HIP HEMIARTHROPLASTY POSSIBLE LEFT TOTAL HIP ARTHOPLASTY on Joana Antony   2.   I recognize that during the surgical operation/procedure, unforeseen conditions may necessitate additional or different procedures than those listed above.  I, therefore, further authorize and request that the above-named surgeon, assistants, or designees perform such procedures as are, in their judgment, necessary and desirable.    3.   My surgeon/physician has discussed prior to my surgery the potential benefits, risks and side effects of this procedure; the likelihood of achieving goals; and potential problems that might occur during recuperation.  They also discussed reasonable alternatives to the procedure, including risks, benefits, and side effects related to the alternatives and risks related to not receiving this procedure.  I have had all my questions answered and I acknowledge that no guarantee has been made as to the result that may be obtained.    4.   Should the need arise during my operation/procedure, which includes change of level of care prior to discharge, I also consent to the administration of blood and/or blood products.  Further, I understand that despite careful testing and screening of blood or blood products by collecting agencies, I may still be subject to ill effects as a result of receiving a blood transfusion and/or blood products.  The following are some, but not all, of the potential risks that can occur: fever and allergic  reactions, hemolytic reactions, transmission of diseases such as Hepatitis, AIDS and Cytomegalovirus (CMV) and fluid overload.  In the event that I wish to have an autologous transfusion of my own blood, or a directed donor transfusion, I will discuss this with my physician.  Check only if Refusing Blood or Blood Products  I understand refusal of blood or blood products as deemed necessary by my physician may have serious consequences to my condition to include possible death. I hereby assume responsibility for my refusal and release the hospital, its personnel, and my physicians from any responsibility for the consequences of my refusal.    o  Refuse   5.   I authorize the use of any specimen, organs, tissues, body parts or foreign objects that may be removed from my body during the operation/procedure for diagnosis, research or teaching purposes and their subsequent disposal by hospital authorities.  I also authorize the release of specimen test results and/or written reports to my treating physician on the hospital medical staff or other referring or consulting physicians involved in my care, at the discretion of the Pathologist or my treating physician.    6.   I consent to the photographing or videotaping of the operations or procedures to be performed, including appropriate portions of my body for medical, scientific, or educational purposes, provided my identity is not revealed by the pictures or by descriptive texts accompanying them.  If the procedure has been photographed/videotaped, the surgeon will obtain the original picture, image, videotape or CD.  The hospital will not be responsible for storage, release or maintenance of the picture, image, tape or CD.    7.   I consent to the presence of a  or observers in the operating room as deemed necessary by my physician or their designees.    8.   I recognize that in the event my procedure results in extended X-Ray/fluoroscopy time, I may  develop a skin reaction.    9. If I have a Do Not Attempt Resuscitation (DNAR) order in place, that status will be suspended while in the operating room, procedural suite, and during the recovery period unless otherwise explicitly stated by me (or a person authorized to consent on my behalf). The surgeon or my attending physician will determine when the applicable recovery period ends for purposes of reinstating the DNAR order.  10. Patients having a sterilization procedure: I understand that if the procedure is successful the results will be permanent and it will therefore be impossible for me to inseminate, conceive, or bear children.  I also understand that the procedure is intended to result in sterility, although the result has not been guaranteed.   11. I acknowledge that my physician has explained sedation/analgesia administration to me including the risk and benefits I consent to the administration of sedation/analgesia as may be necessary or desirable in the judgment of my physician.    I CERTIFY THAT I HAVE READ AND FULLY UNDERSTAND THE ABOVE CONSENT TO OPERATION and/or OTHER PROCEDURE.     ____________________________________  _________________________________        ______________________________  Signature of Patient    Signature of Responsible Person                Printed Name of Responsible Person                                      ____________________________________  _____________________________                ________________________________  Signature of Witness        Date  Time         Relationship to Patient    STATEMENT OF PHYSICIAN My signature below affirms that prior to the time of the procedure; I have explained to the patient and/or his/her legal representative, the risks and benefits involved in the proposed treatment and any reasonable alternative to the proposed treatment. I have also explained the risks and benefits involved in refusal of the proposed treatment and alternatives to  the proposed treatment and have answered the patient's questions. If I have a significant financial interest in a co-management agreement or a significant financial interest in any product or implant, or other significant relationship used in this procedure/surgery, I have disclosed this and had a discussion with my patient.     _____________________________________________________              _____________________________  (Signature of Physician)                                                                                         (Date)                                   (Time)  Patient Name: Joaan Brizuelaicks      : 1930      Printed: 2025     Medical Record #: [id number]                                      Page 1 of 1

## (undated) NOTE — LETTER
Date & Time: 3/23/2023, 11:24 AM  Patient: [place]  Encounter Provider(s):    Tomasa Krueger MD       To Whom It May Concern:    Stefania Mitchell was seen and treated in our department on 2/25/2023. She was sent to the Emergency Department for evaluation and admission.  If you have any questions or concerns, please do not hesitate to call.        _____________________________  Physician/APC Signature

## (undated) NOTE — LETTER
[facility]  Administration of Anesthesia  I Joana Antony agree to be cared for by a physician anesthesiologist alone and/or with a nurse anesthetist, who is specially trained to monitor me and give me medicine to put me to sleep or keep me comfortable during my procedure    I understand that my anesthesiologist and/or anesthetist is not an employee or agent of [facility] or [employer]. He or she works for [facility]    As the patient asking for anesthesia services, I agree to:  Allow the anesthesiologist (anesthesia doctor) to give me medicine and do additional procedures as necessary. Some examples are: Starting or using an “IV” to give me medicine, fluids or blood during my procedure, and having a breathing tube placed to help me breathe when I’m asleep (intubation). In the event that my heart stops working properly, I understand that my anesthesiologist will make every effort to sustain my life, unless otherwise directed by [facility] Do Not Resuscitate documents.  Tell my anesthesia doctor before my procedure:  If I am pregnant.  The last time that I ate or drank.  iii. All of the medicines I take (including prescriptions, herbal supplements, and pills I can buy without a prescription (including street drugs/illegal medications). Failure to inform my anesthesiologist about these medicines may increase my risk of anesthetic complications.  iv.If I am allergic to anything or have had a reaction to anesthesia before.  I understand how the anesthesia medicine will help me (benefits).  I understand that with any type of anesthesia medicine there are risks:  The most common risks are: nausea, vomiting, sore throat, muscle soreness, damage to my eyes, mouth, or teeth (from breathing tube placement).  Rare risks include: remembering what happened during my procedure, allergic reactions to medications, injury to my airway, heart, lungs, vision, nerves, or  muscles and in extremely rare instances death.  My doctor has explained to me other choices available to me for my care (alternatives).  Pregnant Patients (“epidural”):  I understand that the risks of having an epidural (medicine given into my back to help control pain during labor), include itching, low blood pressure, difficulty urinating, headache or slowing of the baby’s heart. Very rare risks include infection, bleeding, seizure, irregular heart rhythms and nerve injury.  Regional Anesthesia (“spinal”, “epidural”, & “nerve blocks”):  I understand that rare but potential complications include headache, bleeding, infection, seizure, irregular heart rhythms, and nerve injury.    _____________________________________________________________________________  Patient (or Representative) Signature/Relationship to Patient  Date   Time    _____________________________________________________________________________   Name (if used)    Language/Organization   Time    _____________________________________________________________________________  Nurse Anesthetist Signature     Date   Time  _____________________________________________________________________________  Anesthesiologist Signature     Date   Time  I have discussed the procedure and information above with the patient (or patient’s representative) and answered their questions. The patient or their representative has agreed to have anesthesia services.    _____________________________________________________________________________  Witness        Date   Time  I have verified that the signature is that of the patient or patient’s representative, and that it was signed before the procedure  Patient Name: Joana Antony     : 1930                 Printed: 2025 at 4:17 AM    Medical Record #: [id number]                                            Page 1 of 1  ----------ANESTHESIA CONSENT----------

## (undated) NOTE — MR AVS SNAPSHOT
[facility]  [address], [address] [place] [address]  [address] [facility] [phone]               Thank you for choosing us for your health care visit with Tennis .  Andrea Montoya MD.  We are glad to serve you and happy to provide you with this summary of you · Refills are not addressed on weekends; covering physicians do not authorize routine medications on weekends. · No narcotics or controlled substances are refilled after noon on Fridays or by on call physicians.   · If your prescription is due for a refill view more details from this visit by going to [email or web address]. If you've recently had a stay at the Hospital you can access your discharge instructions in [facility] [employer] [facility] by going to Visits < Admission Summaries.  If you've been to the Emergency Depar Eat plenty of protein, keep the fat content low Sugars:  sodas and sports drinks, candies and desserts   Eat plenty of low-fat dairy products High fat meats and dairy   Choose whole grain products Foods high in sodium   Water is best for hydration Fast racheal

## (undated) NOTE — MR AVS SNAPSHOT
[facility]  [address], [address] [place] [address]  [address] [facility] [phone]               Thank you for choosing us for your health care visit with Jaylon Kat.  Liz Armando MD.  We are glad to serve you and happy to provide you with this summary of you the pharmacy. · You will need to inform staff at which office you will  your prescription. · These prescriptions can only be dispensed as a 30 day supply. · These prescriptions cannot be given additional refills.     Refill policies:  · Allow 2-3 Allergies as of May 18, 2017     Keflex Diarrhea    Cefadroxil Diarrhea    severe    Clindamycin Diarrhea                Today's Vital Signs     BP Pulse Height Weight BMI    130/62 mmHg 84 64\" 104 lb 17.84 kg/m2         Current Medications          This walking, light jogging, cycling, swimming, etc.) for a goal of at least 150 minutes per week. Moderation of alcohol consumption Men: limit to <= 2 drinks* per day. Women and lighter weight persons: limit to <= 1 drink* per day.               Visit [facility]WARD